# Patient Record
Sex: MALE | Race: WHITE | NOT HISPANIC OR LATINO | Employment: OTHER | URBAN - METROPOLITAN AREA
[De-identification: names, ages, dates, MRNs, and addresses within clinical notes are randomized per-mention and may not be internally consistent; named-entity substitution may affect disease eponyms.]

---

## 2017-02-09 ENCOUNTER — COMMUNICATION - HEALTHEAST (OUTPATIENT)
Dept: INTERNAL MEDICINE | Facility: CLINIC | Age: 67
End: 2017-02-09

## 2017-05-03 ENCOUNTER — COMMUNICATION - HEALTHEAST (OUTPATIENT)
Dept: INTERNAL MEDICINE | Facility: CLINIC | Age: 67
End: 2017-05-03

## 2017-06-19 ENCOUNTER — RECORDS - HEALTHEAST (OUTPATIENT)
Dept: ADMINISTRATIVE | Facility: OTHER | Age: 67
End: 2017-06-19

## 2017-06-26 ENCOUNTER — RECORDS - HEALTHEAST (OUTPATIENT)
Dept: ADMINISTRATIVE | Facility: OTHER | Age: 67
End: 2017-06-26

## 2017-07-19 ENCOUNTER — COMMUNICATION - HEALTHEAST (OUTPATIENT)
Dept: INTERNAL MEDICINE | Facility: CLINIC | Age: 67
End: 2017-07-19

## 2017-07-24 ENCOUNTER — OFFICE VISIT - HEALTHEAST (OUTPATIENT)
Dept: INTERNAL MEDICINE | Facility: CLINIC | Age: 67
End: 2017-07-24

## 2017-07-24 ENCOUNTER — AMBULATORY - HEALTHEAST (OUTPATIENT)
Dept: INTERNAL MEDICINE | Facility: CLINIC | Age: 67
End: 2017-07-24

## 2017-07-24 ENCOUNTER — COMMUNICATION - HEALTHEAST (OUTPATIENT)
Dept: SCHEDULING | Facility: CLINIC | Age: 67
End: 2017-07-24

## 2017-07-24 DIAGNOSIS — R79.89 ABNORMAL LIVER FUNCTION TESTS: ICD-10-CM

## 2017-07-24 DIAGNOSIS — Z00.00 ROUTINE GENERAL MEDICAL EXAMINATION AT A HEALTH CARE FACILITY: ICD-10-CM

## 2017-07-24 LAB
CHOLEST SERPL-MCNC: 168 MG/DL
FASTING STATUS PATIENT QL REPORTED: YES
HDLC SERPL-MCNC: 39 MG/DL
LDLC SERPL CALC-MCNC: 107 MG/DL
PSA SERPL-MCNC: <0.1 NG/ML (ref 0–4.5)
TRIGL SERPL-MCNC: 111 MG/DL

## 2017-07-24 ASSESSMENT — MIFFLIN-ST. JEOR: SCORE: 1681.38

## 2017-07-25 ENCOUNTER — COMMUNICATION - HEALTHEAST (OUTPATIENT)
Dept: INTERNAL MEDICINE | Facility: CLINIC | Age: 67
End: 2017-07-25

## 2017-07-27 ENCOUNTER — HOSPITAL ENCOUNTER (OUTPATIENT)
Dept: ULTRASOUND IMAGING | Facility: HOSPITAL | Age: 67
Discharge: HOME | End: 2017-07-27
Attending: INTERNAL MEDICINE

## 2017-07-27 DIAGNOSIS — R79.89 ABNORMAL LIVER FUNCTION TESTS: ICD-10-CM

## 2017-07-27 DIAGNOSIS — R79.89 OTHER SPECIFIED ABNORMAL FINDINGS OF BLOOD CHEMISTRY: ICD-10-CM

## 2017-08-18 ENCOUNTER — COMMUNICATION - HEALTHEAST (OUTPATIENT)
Dept: INTERNAL MEDICINE | Facility: CLINIC | Age: 67
End: 2017-08-18

## 2017-10-31 ENCOUNTER — COMMUNICATION - HEALTHEAST (OUTPATIENT)
Dept: INTERNAL MEDICINE | Facility: CLINIC | Age: 67
End: 2017-10-31

## 2017-11-06 ENCOUNTER — OFFICE VISIT - HEALTHEAST (OUTPATIENT)
Dept: INTERNAL MEDICINE | Facility: CLINIC | Age: 67
End: 2017-11-06

## 2017-11-06 DIAGNOSIS — G62.9 NEUROPATHY: ICD-10-CM

## 2017-11-06 ASSESSMENT — MIFFLIN-ST. JEOR: SCORE: 1685.92

## 2017-11-15 ENCOUNTER — RECORDS - HEALTHEAST (OUTPATIENT)
Dept: ADMINISTRATIVE | Facility: OTHER | Age: 67
End: 2017-11-15

## 2017-11-16 ENCOUNTER — COMMUNICATION - HEALTHEAST (OUTPATIENT)
Dept: INTERNAL MEDICINE | Facility: CLINIC | Age: 67
End: 2017-11-16

## 2017-11-28 ENCOUNTER — COMMUNICATION - HEALTHEAST (OUTPATIENT)
Dept: INTERNAL MEDICINE | Facility: CLINIC | Age: 67
End: 2017-11-28

## 2017-11-29 ENCOUNTER — RECORDS - HEALTHEAST (OUTPATIENT)
Dept: ADMINISTRATIVE | Facility: OTHER | Age: 67
End: 2017-11-29

## 2017-12-13 ENCOUNTER — RECORDS - HEALTHEAST (OUTPATIENT)
Dept: ADMINISTRATIVE | Facility: OTHER | Age: 67
End: 2017-12-13

## 2017-12-20 ENCOUNTER — COMMUNICATION - HEALTHEAST (OUTPATIENT)
Dept: INTERNAL MEDICINE | Facility: CLINIC | Age: 67
End: 2017-12-20

## 2018-01-22 ENCOUNTER — COMMUNICATION - HEALTHEAST (OUTPATIENT)
Dept: INTERNAL MEDICINE | Facility: CLINIC | Age: 68
End: 2018-01-22

## 2018-02-02 ENCOUNTER — OFFICE VISIT - HEALTHEAST (OUTPATIENT)
Dept: INTERNAL MEDICINE | Facility: CLINIC | Age: 68
End: 2018-02-02

## 2018-02-02 DIAGNOSIS — M54.30 SCIATICA: ICD-10-CM

## 2018-02-02 ASSESSMENT — MIFFLIN-ST. JEOR: SCORE: 1704.06

## 2018-02-07 ENCOUNTER — RECORDS - HEALTHEAST (OUTPATIENT)
Dept: ADMINISTRATIVE | Facility: OTHER | Age: 68
End: 2018-02-07

## 2018-02-08 ENCOUNTER — COMMUNICATION - HEALTHEAST (OUTPATIENT)
Dept: INTERNAL MEDICINE | Facility: CLINIC | Age: 68
End: 2018-02-08

## 2018-02-11 ENCOUNTER — COMMUNICATION - HEALTHEAST (OUTPATIENT)
Dept: INTERNAL MEDICINE | Facility: CLINIC | Age: 68
End: 2018-02-11

## 2018-02-11 DIAGNOSIS — I10 HTN (HYPERTENSION): ICD-10-CM

## 2018-02-20 ENCOUNTER — RECORDS - HEALTHEAST (OUTPATIENT)
Dept: ADMINISTRATIVE | Facility: OTHER | Age: 68
End: 2018-02-20

## 2018-08-02 ENCOUNTER — COMMUNICATION - HEALTHEAST (OUTPATIENT)
Dept: INTERNAL MEDICINE | Facility: CLINIC | Age: 68
End: 2018-08-02

## 2018-08-03 ENCOUNTER — OFFICE VISIT - HEALTHEAST (OUTPATIENT)
Dept: INTERNAL MEDICINE | Facility: CLINIC | Age: 68
End: 2018-08-03

## 2018-08-03 DIAGNOSIS — Z00.00 ROUTINE GENERAL MEDICAL EXAMINATION AT A HEALTH CARE FACILITY: ICD-10-CM

## 2018-08-03 LAB
ALBUMIN SERPL-MCNC: 4.1 G/DL (ref 3.5–5)
ALBUMIN UR-MCNC: NEGATIVE MG/DL
ALP SERPL-CCNC: 85 U/L (ref 45–120)
ALT SERPL W P-5'-P-CCNC: 32 U/L (ref 0–45)
ANION GAP SERPL CALCULATED.3IONS-SCNC: 8 MMOL/L (ref 5–18)
APPEARANCE UR: CLEAR
AST SERPL W P-5'-P-CCNC: 26 U/L (ref 0–40)
BILIRUB SERPL-MCNC: 0.9 MG/DL (ref 0–1)
BILIRUB UR QL STRIP: NEGATIVE
BUN SERPL-MCNC: 22 MG/DL (ref 8–22)
CALCIUM SERPL-MCNC: 9.8 MG/DL (ref 8.5–10.5)
CHLORIDE BLD-SCNC: 105 MMOL/L (ref 98–107)
CHOLEST SERPL-MCNC: 175 MG/DL
CO2 SERPL-SCNC: 25 MMOL/L (ref 22–31)
COLOR UR AUTO: YELLOW
CREAT SERPL-MCNC: 1.14 MG/DL (ref 0.7–1.3)
ERYTHROCYTE [DISTWIDTH] IN BLOOD BY AUTOMATED COUNT: 11.6 % (ref 11–14.5)
FASTING STATUS PATIENT QL REPORTED: YES
GFR SERPL CREATININE-BSD FRML MDRD: >60 ML/MIN/1.73M2
GLUCOSE BLD-MCNC: 94 MG/DL (ref 70–125)
GLUCOSE UR STRIP-MCNC: NEGATIVE MG/DL
HCT VFR BLD AUTO: 54 % (ref 40–54)
HDLC SERPL-MCNC: 39 MG/DL
HGB BLD-MCNC: 17.6 G/DL (ref 14–18)
HGB UR QL STRIP: NEGATIVE
KETONES UR STRIP-MCNC: NEGATIVE MG/DL
LDLC SERPL CALC-MCNC: 107 MG/DL
LEUKOCYTE ESTERASE UR QL STRIP: NEGATIVE
MCH RBC QN AUTO: 30 PG (ref 27–34)
MCHC RBC AUTO-ENTMCNC: 32.6 G/DL (ref 32–36)
MCV RBC AUTO: 92 FL (ref 80–100)
NITRATE UR QL: NEGATIVE
PH UR STRIP: 7 [PH] (ref 5–8)
PLATELET # BLD AUTO: 232 THOU/UL (ref 140–440)
PMV BLD AUTO: 8.2 FL (ref 7–10)
POTASSIUM BLD-SCNC: 4.4 MMOL/L (ref 3.5–5)
PROT SERPL-MCNC: 7.5 G/DL (ref 6–8)
PSA SERPL-MCNC: <0.1 NG/ML (ref 0–4.5)
RBC # BLD AUTO: 5.87 MILL/UL (ref 4.4–6.2)
SODIUM SERPL-SCNC: 138 MMOL/L (ref 136–145)
SP GR UR STRIP: 1.01 (ref 1–1.03)
TRIGL SERPL-MCNC: 147 MG/DL
TSH SERPL DL<=0.005 MIU/L-ACNC: 1.9 UIU/ML (ref 0.3–5)
UROBILINOGEN UR STRIP-ACNC: NORMAL
WBC: 8.6 THOU/UL (ref 4–11)

## 2018-08-03 ASSESSMENT — MIFFLIN-ST. JEOR: SCORE: 1682.52

## 2018-08-06 ENCOUNTER — COMMUNICATION - HEALTHEAST (OUTPATIENT)
Dept: INTERNAL MEDICINE | Facility: CLINIC | Age: 68
End: 2018-08-06

## 2018-10-15 ENCOUNTER — AMBULATORY - HEALTHEAST (OUTPATIENT)
Dept: INTERNAL MEDICINE | Facility: CLINIC | Age: 68
End: 2018-10-15

## 2018-10-15 ENCOUNTER — COMMUNICATION - HEALTHEAST (OUTPATIENT)
Dept: INTERNAL MEDICINE | Facility: CLINIC | Age: 68
End: 2018-10-15

## 2018-10-24 ENCOUNTER — COMMUNICATION - HEALTHEAST (OUTPATIENT)
Dept: SCHEDULING | Facility: CLINIC | Age: 68
End: 2018-10-24

## 2018-10-25 ENCOUNTER — AMBULATORY - HEALTHEAST (OUTPATIENT)
Dept: INTERNAL MEDICINE | Facility: CLINIC | Age: 68
End: 2018-10-25

## 2018-10-25 ENCOUNTER — COMMUNICATION - HEALTHEAST (OUTPATIENT)
Dept: INTERNAL MEDICINE | Facility: CLINIC | Age: 68
End: 2018-10-25

## 2018-10-26 ENCOUNTER — COMMUNICATION - HEALTHEAST (OUTPATIENT)
Dept: INTERNAL MEDICINE | Facility: CLINIC | Age: 68
End: 2018-10-26

## 2018-10-29 ENCOUNTER — COMMUNICATION - HEALTHEAST (OUTPATIENT)
Dept: INTERNAL MEDICINE | Facility: CLINIC | Age: 68
End: 2018-10-29

## 2018-10-29 ENCOUNTER — COMMUNICATION - HEALTHEAST (OUTPATIENT)
Dept: SCHEDULING | Facility: CLINIC | Age: 68
End: 2018-10-29

## 2018-11-07 ENCOUNTER — COMMUNICATION - HEALTHEAST (OUTPATIENT)
Dept: INTERNAL MEDICINE | Facility: CLINIC | Age: 68
End: 2018-11-07

## 2018-11-20 ENCOUNTER — RECORDS - HEALTHEAST (OUTPATIENT)
Dept: ADMINISTRATIVE | Facility: OTHER | Age: 68
End: 2018-11-20

## 2018-11-28 ENCOUNTER — OFFICE VISIT - HEALTHEAST (OUTPATIENT)
Dept: INTERNAL MEDICINE | Facility: CLINIC | Age: 68
End: 2018-11-28

## 2018-11-28 DIAGNOSIS — C61 MALIGNANT NEOPLASM OF PROSTATE (H): ICD-10-CM

## 2018-11-28 DIAGNOSIS — I10 ESSENTIAL HYPERTENSION: ICD-10-CM

## 2018-11-28 ASSESSMENT — MIFFLIN-ST. JEOR: SCORE: 1709.73

## 2019-01-08 ENCOUNTER — OFFICE VISIT - HEALTHEAST (OUTPATIENT)
Dept: INTERNAL MEDICINE | Facility: CLINIC | Age: 69
End: 2019-01-08

## 2019-01-08 DIAGNOSIS — C61 MALIGNANT NEOPLASM OF PROSTATE (H): ICD-10-CM

## 2019-01-08 DIAGNOSIS — I10 ESSENTIAL HYPERTENSION: ICD-10-CM

## 2019-01-08 ASSESSMENT — MIFFLIN-ST. JEOR: SCORE: 1696.12

## 2019-01-14 ENCOUNTER — RECORDS - HEALTHEAST (OUTPATIENT)
Dept: ADMINISTRATIVE | Facility: OTHER | Age: 69
End: 2019-01-14

## 2019-02-25 ENCOUNTER — RECORDS - HEALTHEAST (OUTPATIENT)
Dept: ADMINISTRATIVE | Facility: OTHER | Age: 69
End: 2019-02-25

## 2019-04-05 ENCOUNTER — COMMUNICATION - HEALTHEAST (OUTPATIENT)
Dept: INTERNAL MEDICINE | Facility: CLINIC | Age: 69
End: 2019-04-05

## 2019-04-05 DIAGNOSIS — I10 HTN (HYPERTENSION): ICD-10-CM

## 2019-04-18 ENCOUNTER — OFFICE VISIT - HEALTHEAST (OUTPATIENT)
Dept: INTERNAL MEDICINE | Facility: CLINIC | Age: 69
End: 2019-04-18

## 2019-04-18 DIAGNOSIS — I10 ESSENTIAL HYPERTENSION: ICD-10-CM

## 2019-04-18 ASSESSMENT — MIFFLIN-ST. JEOR: SCORE: 1682.52

## 2019-08-15 ENCOUNTER — OFFICE VISIT - HEALTHEAST (OUTPATIENT)
Dept: INTERNAL MEDICINE | Facility: CLINIC | Age: 69
End: 2019-08-15

## 2019-08-15 DIAGNOSIS — Z12.5 SCREENING FOR PROSTATE CANCER: ICD-10-CM

## 2019-08-15 DIAGNOSIS — Z00.00 ROUTINE GENERAL MEDICAL EXAMINATION AT A HEALTH CARE FACILITY: ICD-10-CM

## 2019-08-15 LAB
ALBUMIN SERPL-MCNC: 4.4 G/DL (ref 3.5–5)
ALBUMIN UR-MCNC: NEGATIVE MG/DL
ALP SERPL-CCNC: 96 U/L (ref 45–120)
ALT SERPL W P-5'-P-CCNC: 26 U/L (ref 0–45)
ANION GAP SERPL CALCULATED.3IONS-SCNC: 9 MMOL/L (ref 5–18)
APPEARANCE UR: CLEAR
AST SERPL W P-5'-P-CCNC: 26 U/L (ref 0–40)
BILIRUB SERPL-MCNC: 0.8 MG/DL (ref 0–1)
BILIRUB UR QL STRIP: NEGATIVE
BUN SERPL-MCNC: 17 MG/DL (ref 8–22)
CALCIUM SERPL-MCNC: 10.1 MG/DL (ref 8.5–10.5)
CHLORIDE BLD-SCNC: 107 MMOL/L (ref 98–107)
CHOLEST SERPL-MCNC: 165 MG/DL
CO2 SERPL-SCNC: 24 MMOL/L (ref 22–31)
COLOR UR AUTO: YELLOW
CREAT SERPL-MCNC: 1.12 MG/DL (ref 0.7–1.3)
ERYTHROCYTE [DISTWIDTH] IN BLOOD BY AUTOMATED COUNT: 11.6 % (ref 11–14.5)
FASTING STATUS PATIENT QL REPORTED: ABNORMAL
GFR SERPL CREATININE-BSD FRML MDRD: >60 ML/MIN/1.73M2
GLUCOSE BLD-MCNC: 92 MG/DL (ref 70–125)
GLUCOSE UR STRIP-MCNC: NEGATIVE MG/DL
HBA1C MFR BLD: 5.5 % (ref 3.5–6)
HCT VFR BLD AUTO: 53 % (ref 40–54)
HDLC SERPL-MCNC: 35 MG/DL
HGB BLD-MCNC: 17.7 G/DL (ref 14–18)
HGB UR QL STRIP: NEGATIVE
KETONES UR STRIP-MCNC: NEGATIVE MG/DL
LDLC SERPL CALC-MCNC: 111 MG/DL
LEUKOCYTE ESTERASE UR QL STRIP: NEGATIVE
MCH RBC QN AUTO: 30.5 PG (ref 27–34)
MCHC RBC AUTO-ENTMCNC: 33.4 G/DL (ref 32–36)
MCV RBC AUTO: 91 FL (ref 80–100)
NITRATE UR QL: NEGATIVE
PH UR STRIP: 7 [PH] (ref 5–8)
PLATELET # BLD AUTO: 218 THOU/UL (ref 140–440)
PMV BLD AUTO: 8.3 FL (ref 7–10)
POTASSIUM BLD-SCNC: 4.6 MMOL/L (ref 3.5–5)
PROT SERPL-MCNC: 7.2 G/DL (ref 6–8)
PSA SERPL-MCNC: <0.1 NG/ML (ref 0–4.5)
RBC # BLD AUTO: 5.8 MILL/UL (ref 4.4–6.2)
SODIUM SERPL-SCNC: 140 MMOL/L (ref 136–145)
SP GR UR STRIP: 1.01 (ref 1–1.03)
TRIGL SERPL-MCNC: 94 MG/DL
TSH SERPL DL<=0.005 MIU/L-ACNC: 1.64 UIU/ML (ref 0.3–5)
UROBILINOGEN UR STRIP-ACNC: NORMAL
WBC: 7.8 THOU/UL (ref 4–11)

## 2019-08-15 ASSESSMENT — MIFFLIN-ST. JEOR: SCORE: 1659.84

## 2019-08-16 ENCOUNTER — COMMUNICATION - HEALTHEAST (OUTPATIENT)
Dept: INTERNAL MEDICINE | Facility: CLINIC | Age: 69
End: 2019-08-16

## 2019-11-22 ENCOUNTER — COMMUNICATION - HEALTHEAST (OUTPATIENT)
Dept: INTERNAL MEDICINE | Facility: CLINIC | Age: 69
End: 2019-11-22

## 2019-11-22 DIAGNOSIS — I10 ESSENTIAL HYPERTENSION: ICD-10-CM

## 2019-12-02 ENCOUNTER — RECORDS - HEALTHEAST (OUTPATIENT)
Dept: ADMINISTRATIVE | Facility: OTHER | Age: 69
End: 2019-12-02

## 2019-12-02 ASSESSMENT — MIFFLIN-ST. JEOR: SCORE: 1691.14

## 2019-12-03 ENCOUNTER — SURGERY - HEALTHEAST (OUTPATIENT)
Dept: CARDIOLOGY | Facility: CLINIC | Age: 69
End: 2019-12-03

## 2019-12-03 ASSESSMENT — MIFFLIN-ST. JEOR
SCORE: 1681.16
SCORE: 1680.25

## 2019-12-04 ENCOUNTER — COMMUNICATION - HEALTHEAST (OUTPATIENT)
Dept: INTERNAL MEDICINE | Facility: CLINIC | Age: 69
End: 2019-12-04

## 2019-12-04 ENCOUNTER — AMBULATORY - HEALTHEAST (OUTPATIENT)
Dept: CARDIAC REHAB | Facility: CLINIC | Age: 69
End: 2019-12-04

## 2019-12-04 DIAGNOSIS — I21.4 NSTEMI (NON-ST ELEVATED MYOCARDIAL INFARCTION) (H): ICD-10-CM

## 2019-12-04 ASSESSMENT — MIFFLIN-ST. JEOR: SCORE: 1678.89

## 2019-12-06 ENCOUNTER — COMMUNICATION - HEALTHEAST (OUTPATIENT)
Dept: CARE COORDINATION | Facility: CLINIC | Age: 69
End: 2019-12-06

## 2019-12-09 ENCOUNTER — OFFICE VISIT - HEALTHEAST (OUTPATIENT)
Dept: INTERNAL MEDICINE | Facility: CLINIC | Age: 69
End: 2019-12-09

## 2019-12-09 ENCOUNTER — COMMUNICATION - HEALTHEAST (OUTPATIENT)
Dept: TELEHEALTH | Facility: CLINIC | Age: 69
End: 2019-12-09

## 2019-12-09 DIAGNOSIS — I10 ESSENTIAL HYPERTENSION: ICD-10-CM

## 2019-12-09 LAB
CHOLEST SERPL-MCNC: 129 MG/DL
FASTING STATUS PATIENT QL REPORTED: YES
HDLC SERPL-MCNC: 33 MG/DL
LDLC SERPL CALC-MCNC: 61 MG/DL
TRIGL SERPL-MCNC: 173 MG/DL

## 2019-12-09 ASSESSMENT — MIFFLIN-ST. JEOR: SCORE: 1702.93

## 2019-12-10 ENCOUNTER — COMMUNICATION - HEALTHEAST (OUTPATIENT)
Dept: INTERNAL MEDICINE | Facility: CLINIC | Age: 69
End: 2019-12-10

## 2019-12-18 ENCOUNTER — OFFICE VISIT - HEALTHEAST (OUTPATIENT)
Dept: CARDIOLOGY | Facility: CLINIC | Age: 69
End: 2019-12-18

## 2019-12-18 DIAGNOSIS — I24.9 ACS (ACUTE CORONARY SYNDROME) (H): ICD-10-CM

## 2019-12-18 DIAGNOSIS — R79.89 ELEVATED TROPONIN: ICD-10-CM

## 2019-12-18 DIAGNOSIS — I42.9 CARDIOMYOPATHY, UNSPECIFIED TYPE (H): ICD-10-CM

## 2019-12-18 ASSESSMENT — MIFFLIN-ST. JEOR: SCORE: 1721.08

## 2019-12-20 ENCOUNTER — HOSPITAL ENCOUNTER (OUTPATIENT)
Dept: CARDIOLOGY | Facility: HOSPITAL | Age: 69
Discharge: HOME | End: 2019-12-20
Attending: INTERNAL MEDICINE

## 2019-12-20 DIAGNOSIS — I42.9 CARDIOMYOPATHY, UNSPECIFIED TYPE (H): ICD-10-CM

## 2019-12-20 ASSESSMENT — MIFFLIN-ST. JEOR: SCORE: 1721.08

## 2019-12-23 LAB
AORTIC ROOT: 3.7 CM
ASCENDING AORTA: 3.7 CM
BSA FOR ECHO PROCEDURE: 2.17 M2
CV BLOOD PRESSURE: ABNORMAL MMHG
CV ECHO HEIGHT: 71 IN
CV ECHO WEIGHT: 207 LBS
EJECTION FRACTION: 55 % (ref 55–75)
FRACTIONAL SHORTENING: 42.4 % (ref 28–44)
INTERVENTRICULAR SEPTUM IN END DIASTOLE: 1.2 CM (ref 0.6–1)
IVS/PW RATIO: 1.1
LEFT VENTRICLE DIASTOLIC VOLUME INDEX: 57.1 CM3/M2 (ref 34–74)
LEFT VENTRICLE DIASTOLIC VOLUME: 124 CM3 (ref 62–150)
LEFT VENTRICLE HEART RATE: 60 BPM
LEFT VENTRICLE MASS INDEX: 118.1 G/M2
LEFT VENTRICLE SYSTOLIC VOLUME INDEX: 25.7 CM3/M2 (ref 11–31)
LEFT VENTRICLE SYSTOLIC VOLUME: 55.7 CM3 (ref 21–61)
LEFT VENTRICULAR INTERNAL DIMENSION IN DIASTOLE: 5.45 CM (ref 4.2–5.8)
LEFT VENTRICULAR INTERNAL DIMENSION IN SYSTOLE: 3.14 CM (ref 2.5–4)
LEFT VENTRICULAR MASS: 256.2 G
LEFT VENTRICULAR POSTERIOR WALL IN END DIASTOLE: 1.12 CM (ref 0.6–1)
NUC REST DIASTOLIC VOLUME INDEX: 3312 LBS
NUC REST SYSTOLIC VOLUME INDEX: 71 IN

## 2020-01-20 ENCOUNTER — COMMUNICATION - HEALTHEAST (OUTPATIENT)
Dept: INTERNAL MEDICINE | Facility: CLINIC | Age: 70
End: 2020-01-20

## 2020-02-26 ENCOUNTER — COMMUNICATION - HEALTHEAST (OUTPATIENT)
Dept: INTERNAL MEDICINE | Facility: CLINIC | Age: 70
End: 2020-02-26

## 2020-02-26 DIAGNOSIS — I24.9 ACS (ACUTE CORONARY SYNDROME) (H): ICD-10-CM

## 2020-02-26 DIAGNOSIS — I10 ESSENTIAL HYPERTENSION: ICD-10-CM

## 2020-03-02 ENCOUNTER — COMMUNICATION - HEALTHEAST (OUTPATIENT)
Dept: INTERNAL MEDICINE | Facility: CLINIC | Age: 70
End: 2020-03-02

## 2020-03-05 ENCOUNTER — RECORDS - HEALTHEAST (OUTPATIENT)
Dept: ADMINISTRATIVE | Facility: OTHER | Age: 70
End: 2020-03-05

## 2020-03-06 ENCOUNTER — OFFICE VISIT - HEALTHEAST (OUTPATIENT)
Dept: INTERNAL MEDICINE | Facility: CLINIC | Age: 70
End: 2020-03-06

## 2020-03-06 ENCOUNTER — COMMUNICATION - HEALTHEAST (OUTPATIENT)
Dept: INTERNAL MEDICINE | Facility: CLINIC | Age: 70
End: 2020-03-06

## 2020-03-06 DIAGNOSIS — I10 ESSENTIAL HYPERTENSION: ICD-10-CM

## 2020-03-06 ASSESSMENT — MIFFLIN-ST. JEOR: SCORE: 1702.93

## 2020-03-07 ENCOUNTER — RECORDS - HEALTHEAST (OUTPATIENT)
Dept: ADMINISTRATIVE | Facility: OTHER | Age: 70
End: 2020-03-07

## 2020-03-12 ENCOUNTER — RECORDS - HEALTHEAST (OUTPATIENT)
Dept: ADMINISTRATIVE | Facility: OTHER | Age: 70
End: 2020-03-12

## 2020-03-17 ENCOUNTER — OFFICE VISIT - HEALTHEAST (OUTPATIENT)
Dept: INTERNAL MEDICINE | Facility: CLINIC | Age: 70
End: 2020-03-17

## 2020-03-17 DIAGNOSIS — I10 ESSENTIAL HYPERTENSION: ICD-10-CM

## 2020-03-17 ASSESSMENT — MIFFLIN-ST. JEOR: SCORE: 1702.93

## 2020-03-25 ENCOUNTER — COMMUNICATION - HEALTHEAST (OUTPATIENT)
Dept: INTERNAL MEDICINE | Facility: CLINIC | Age: 70
End: 2020-03-25

## 2020-03-27 ENCOUNTER — OFFICE VISIT - HEALTHEAST (OUTPATIENT)
Dept: INTERNAL MEDICINE | Facility: CLINIC | Age: 70
End: 2020-03-27

## 2020-03-27 DIAGNOSIS — I25.10 CORONARY ARTERY DISEASE DUE TO LIPID RICH PLAQUE: ICD-10-CM

## 2020-03-27 DIAGNOSIS — I25.83 CORONARY ARTERY DISEASE DUE TO LIPID RICH PLAQUE: ICD-10-CM

## 2020-04-14 ENCOUNTER — COMMUNICATION - HEALTHEAST (OUTPATIENT)
Dept: INTERNAL MEDICINE | Facility: CLINIC | Age: 70
End: 2020-04-14

## 2020-04-14 ENCOUNTER — AMBULATORY - HEALTHEAST (OUTPATIENT)
Dept: INTERNAL MEDICINE | Facility: CLINIC | Age: 70
End: 2020-04-14

## 2020-04-14 DIAGNOSIS — E88.810 METABOLIC SYNDROME: ICD-10-CM

## 2020-04-20 ENCOUNTER — RECORDS - HEALTHEAST (OUTPATIENT)
Dept: ADMINISTRATIVE | Facility: OTHER | Age: 70
End: 2020-04-20

## 2020-04-26 ENCOUNTER — COMMUNICATION - HEALTHEAST (OUTPATIENT)
Dept: SCHEDULING | Facility: CLINIC | Age: 70
End: 2020-04-26

## 2020-04-26 ENCOUNTER — RECORDS - HEALTHEAST (OUTPATIENT)
Dept: ADMINISTRATIVE | Facility: OTHER | Age: 70
End: 2020-04-26

## 2020-04-28 ENCOUNTER — COMMUNICATION - HEALTHEAST (OUTPATIENT)
Dept: INTERNAL MEDICINE | Facility: CLINIC | Age: 70
End: 2020-04-28

## 2020-04-28 ENCOUNTER — AMBULATORY - HEALTHEAST (OUTPATIENT)
Dept: INTERNAL MEDICINE | Facility: CLINIC | Age: 70
End: 2020-04-28

## 2020-04-28 DIAGNOSIS — E88.810 METABOLIC SYNDROME: ICD-10-CM

## 2020-06-09 ENCOUNTER — COMMUNICATION - HEALTHEAST (OUTPATIENT)
Dept: CARDIOLOGY | Facility: CLINIC | Age: 70
End: 2020-06-09

## 2020-06-09 ENCOUNTER — COMMUNICATION - HEALTHEAST (OUTPATIENT)
Dept: INTERNAL MEDICINE | Facility: CLINIC | Age: 70
End: 2020-06-09

## 2020-08-14 ENCOUNTER — COMMUNICATION - HEALTHEAST (OUTPATIENT)
Dept: INTERNAL MEDICINE | Facility: CLINIC | Age: 70
End: 2020-08-14

## 2020-08-14 DIAGNOSIS — I10 ESSENTIAL HYPERTENSION: ICD-10-CM

## 2020-08-17 ENCOUNTER — OFFICE VISIT - HEALTHEAST (OUTPATIENT)
Dept: INTERNAL MEDICINE | Facility: CLINIC | Age: 70
End: 2020-08-17

## 2020-08-17 DIAGNOSIS — Z12.5 SCREENING FOR PROSTATE CANCER: ICD-10-CM

## 2020-08-17 DIAGNOSIS — Z00.00 ROUTINE GENERAL MEDICAL EXAMINATION AT A HEALTH CARE FACILITY: ICD-10-CM

## 2020-08-17 DIAGNOSIS — I10 ESSENTIAL HYPERTENSION: ICD-10-CM

## 2020-08-17 LAB
ALBUMIN SERPL-MCNC: 4.1 G/DL (ref 3.5–5)
ALBUMIN UR-MCNC: NEGATIVE MG/DL
ALP SERPL-CCNC: 97 U/L (ref 45–120)
ALT SERPL W P-5'-P-CCNC: 33 U/L (ref 0–45)
ANION GAP SERPL CALCULATED.3IONS-SCNC: 8 MMOL/L (ref 5–18)
APPEARANCE UR: CLEAR
AST SERPL W P-5'-P-CCNC: 24 U/L (ref 0–40)
BACTERIA #/AREA URNS HPF: ABNORMAL HPF
BILIRUB SERPL-MCNC: 0.7 MG/DL (ref 0–1)
BILIRUB UR QL STRIP: NEGATIVE
BUN SERPL-MCNC: 25 MG/DL (ref 8–28)
CALCIUM SERPL-MCNC: 10 MG/DL (ref 8.5–10.5)
CHLORIDE BLD-SCNC: 106 MMOL/L (ref 98–107)
CHOLEST SERPL-MCNC: 140 MG/DL
CO2 SERPL-SCNC: 24 MMOL/L (ref 22–31)
COLOR UR AUTO: YELLOW
CREAT SERPL-MCNC: 1.07 MG/DL (ref 0.7–1.3)
ERYTHROCYTE [DISTWIDTH] IN BLOOD BY AUTOMATED COUNT: 12 % (ref 11–14.5)
FASTING STATUS PATIENT QL REPORTED: YES
GFR SERPL CREATININE-BSD FRML MDRD: >60 ML/MIN/1.73M2
GLUCOSE BLD-MCNC: 91 MG/DL (ref 70–125)
GLUCOSE UR STRIP-MCNC: NEGATIVE MG/DL
HCT VFR BLD AUTO: 50.5 % (ref 40–54)
HDLC SERPL-MCNC: 33 MG/DL
HGB BLD-MCNC: 16.5 G/DL (ref 14–18)
HGB UR QL STRIP: NEGATIVE
KETONES UR STRIP-MCNC: NEGATIVE MG/DL
LDLC SERPL CALC-MCNC: 86 MG/DL
LEUKOCYTE ESTERASE UR QL STRIP: NEGATIVE
MCH RBC QN AUTO: 30.1 PG (ref 27–34)
MCHC RBC AUTO-ENTMCNC: 32.6 G/DL (ref 32–36)
MCV RBC AUTO: 92 FL (ref 80–100)
NITRATE UR QL: NEGATIVE
PH UR STRIP: 6 [PH] (ref 5–8)
PLATELET # BLD AUTO: 241 THOU/UL (ref 140–440)
PMV BLD AUTO: 7.9 FL (ref 7–10)
POTASSIUM BLD-SCNC: 4.5 MMOL/L (ref 3.5–5)
PROT SERPL-MCNC: 7 G/DL (ref 6–8)
PSA SERPL-MCNC: <0.1 NG/ML (ref 0–6.5)
RBC # BLD AUTO: 5.47 MILL/UL (ref 4.4–6.2)
RBC #/AREA URNS AUTO: ABNORMAL HPF
SODIUM SERPL-SCNC: 138 MMOL/L (ref 136–145)
SP GR UR STRIP: >=1.03 (ref 1–1.03)
SQUAMOUS #/AREA URNS AUTO: ABNORMAL LPF
TRIGL SERPL-MCNC: 103 MG/DL
TSH SERPL DL<=0.005 MIU/L-ACNC: 1.62 UIU/ML (ref 0.3–5)
UROBILINOGEN UR STRIP-ACNC: ABNORMAL
WBC #/AREA URNS AUTO: ABNORMAL HPF
WBC: 8.8 THOU/UL (ref 4–11)

## 2020-08-17 RX ORDER — DICLOFENAC SODIUM 75 MG/1
75 TABLET, DELAYED RELEASE ORAL 2 TIMES DAILY
Status: SHIPPED | COMMUNITY
Start: 2020-05-08 | End: 2021-08-24

## 2020-08-17 RX ORDER — MAGNESIUM 30 MG
400 TABLET ORAL DAILY
Status: SHIPPED | COMMUNITY
Start: 2020-08-17 | End: 2023-02-01

## 2020-08-17 ASSESSMENT — ANXIETY QUESTIONNAIRES
2. NOT BEING ABLE TO STOP OR CONTROL WORRYING: NOT AT ALL
1. FEELING NERVOUS, ANXIOUS, OR ON EDGE: NOT AT ALL

## 2020-08-17 ASSESSMENT — MIFFLIN-ST. JEOR: SCORE: 1667.78

## 2020-08-18 ENCOUNTER — COMMUNICATION - HEALTHEAST (OUTPATIENT)
Dept: INTERNAL MEDICINE | Facility: CLINIC | Age: 70
End: 2020-08-18

## 2020-08-21 ENCOUNTER — COMMUNICATION - HEALTHEAST (OUTPATIENT)
Dept: INTERNAL MEDICINE | Facility: CLINIC | Age: 70
End: 2020-08-21

## 2020-08-21 ENCOUNTER — COMMUNICATION - HEALTHEAST (OUTPATIENT)
Dept: SCHEDULING | Facility: CLINIC | Age: 70
End: 2020-08-21

## 2020-09-15 ENCOUNTER — COMMUNICATION - HEALTHEAST (OUTPATIENT)
Dept: INTERNAL MEDICINE | Facility: CLINIC | Age: 70
End: 2020-09-15

## 2020-10-07 ENCOUNTER — RECORDS - HEALTHEAST (OUTPATIENT)
Dept: ADMINISTRATIVE | Facility: OTHER | Age: 70
End: 2020-10-07

## 2020-11-04 ENCOUNTER — RECORDS - HEALTHEAST (OUTPATIENT)
Dept: ADMINISTRATIVE | Facility: OTHER | Age: 70
End: 2020-11-04

## 2020-12-03 ENCOUNTER — COMMUNICATION - HEALTHEAST (OUTPATIENT)
Dept: CARDIOLOGY | Facility: CLINIC | Age: 70
End: 2020-12-03

## 2020-12-04 ENCOUNTER — OFFICE VISIT - HEALTHEAST (OUTPATIENT)
Dept: CARDIOLOGY | Facility: CLINIC | Age: 70
End: 2020-12-04

## 2020-12-04 DIAGNOSIS — I10 ESSENTIAL HYPERTENSION: ICD-10-CM

## 2020-12-04 DIAGNOSIS — B33.22 VIRAL MYOCARDITIS: ICD-10-CM

## 2020-12-04 ASSESSMENT — MIFFLIN-ST. JEOR: SCORE: 1690.46

## 2020-12-08 ENCOUNTER — COMMUNICATION - HEALTHEAST (OUTPATIENT)
Dept: INTERNAL MEDICINE | Facility: CLINIC | Age: 70
End: 2020-12-08

## 2021-02-12 ENCOUNTER — COMMUNICATION - HEALTHEAST (OUTPATIENT)
Dept: INTERNAL MEDICINE | Facility: CLINIC | Age: 71
End: 2021-02-12

## 2021-02-16 ENCOUNTER — COMMUNICATION - HEALTHEAST (OUTPATIENT)
Dept: INTERNAL MEDICINE | Facility: CLINIC | Age: 71
End: 2021-02-16

## 2021-02-16 DIAGNOSIS — I10 ESSENTIAL HYPERTENSION: ICD-10-CM

## 2021-02-16 RX ORDER — LISINOPRIL 20 MG/1
20 TABLET ORAL DAILY
Qty: 90 TABLET | Refills: 1 | Status: SHIPPED | OUTPATIENT
Start: 2021-02-16 | End: 2021-08-15

## 2021-02-16 RX ORDER — AMLODIPINE BESYLATE 5 MG/1
5 TABLET ORAL DAILY
Qty: 90 TABLET | Refills: 1 | Status: SHIPPED | OUTPATIENT
Start: 2021-02-16 | End: 2021-08-15

## 2021-03-25 ENCOUNTER — AMBULATORY - HEALTHEAST (OUTPATIENT)
Dept: LAB | Facility: CLINIC | Age: 71
End: 2021-03-25

## 2021-03-25 ENCOUNTER — RECORDS - HEALTHEAST (OUTPATIENT)
Dept: ADMINISTRATIVE | Facility: OTHER | Age: 71
End: 2021-03-25

## 2021-03-25 DIAGNOSIS — M16.9 OSTEOARTHRITIS OF HIP: ICD-10-CM

## 2021-03-25 DIAGNOSIS — M25.559 HIP PAIN: ICD-10-CM

## 2021-03-27 LAB — CR SERPL-MCNC: 1.8 UG/L

## 2021-03-28 LAB — ARUP MISCELLANEOUS TEST: NORMAL

## 2021-03-29 LAB
MISCELLANEOUS TEST DEPT. - HE HISTORICAL: NORMAL
PERFORMING LAB: NORMAL
SPECIMEN STATUS: NORMAL
TEST NAME: NORMAL

## 2021-04-03 ENCOUNTER — COMMUNICATION - HEALTHEAST (OUTPATIENT)
Dept: INTERNAL MEDICINE | Facility: CLINIC | Age: 71
End: 2021-04-03

## 2021-04-03 DIAGNOSIS — E88.810 METABOLIC SYNDROME: ICD-10-CM

## 2021-04-05 RX ORDER — LOVASTATIN 10 MG
10 TABLET ORAL AT BEDTIME
Qty: 90 TABLET | Refills: 1 | Status: SHIPPED | OUTPATIENT
Start: 2021-04-05 | End: 2021-10-03

## 2021-04-26 ENCOUNTER — RECORDS - HEALTHEAST (OUTPATIENT)
Dept: ADMINISTRATIVE | Facility: OTHER | Age: 71
End: 2021-04-26

## 2021-05-14 ENCOUNTER — COMMUNICATION - HEALTHEAST (OUTPATIENT)
Dept: INTERNAL MEDICINE | Facility: CLINIC | Age: 71
End: 2021-05-14

## 2021-05-14 DIAGNOSIS — I10 ESSENTIAL HYPERTENSION: ICD-10-CM

## 2021-05-14 RX ORDER — METOPROLOL SUCCINATE 50 MG/1
50 TABLET, EXTENDED RELEASE ORAL DAILY
Qty: 90 TABLET | Refills: 0 | Status: SHIPPED | OUTPATIENT
Start: 2021-05-14 | End: 2021-08-15

## 2021-05-24 ENCOUNTER — RECORDS - HEALTHEAST (OUTPATIENT)
Dept: ADMINISTRATIVE | Facility: OTHER | Age: 71
End: 2021-05-24

## 2021-05-27 NOTE — PROGRESS NOTES
Office Visit - Follow up    Chan Bishop   68 y.o. male    Date of Visit: 4/18/2019    Chief Complaint   Patient presents with     Hypertension       Subjective: Hypertension.    Lost a brother to suicide within the last couple months.    Long discussion regarding that patient also former patient of this examiner.  Had been seen by psychology awaiting consultation with psychiatry I believe.    Offers no new complaints denies chest pain shortness of breath.    No blood in stool or urine offered laboratory testing patient declined.    Colonoscopy normal September 23, 2016.    ROS: A comprehensive review of systems was performed and was otherwise negative    Medications:  Prior to Admission medications    Medication Sig Start Date End Date Taking? Authorizing Provider   amLODIPine-benazepril (LOTREL 5-20) 5-20 mg per capsule TAKE ONE CAPSULE BY MOUTH ONE TIME DAILY  4/6/19  Yes Florentin Bill MD   metoprolol succinate (TOPROL-XL) 50 MG 24 hr tablet Take 1 tablet (50 mg total) by mouth daily. 11/28/18  Yes Florentin Bill MD   acyclovir (ZOVIRAX) 5 % ointment Thin layer tid prn 11/29/17   Florentin Bill MD       Allergies:   Allergies   Allergen Reactions     Sulfa (Sulfonamide Antibiotics) Rash       Immunizations:   Immunization History   Administered Date(s) Administered     Hep A, historic 01/10/2008     Hep B, Adult 08/11/2015, 09/11/2015, 02/23/2016     Influenza high dose, seasonal 09/11/2015, 10/14/2016, 09/30/2017     Influenza, nasal, historic 09/17/2018     Japanese Encephalitis, IM 08/16/2010, 09/09/2010, 09/05/2013     Pneumo Conj 13-V (2010&after) 07/20/2015     Pneumo Polysac 23-V 07/21/2016     Td, Adult, Absorbed 06/02/2004     Td,adult,historic,unspecified 06/02/2004, 03/02/2009     Tdap 03/02/2009     Typhoid Live, Oral 01/10/2008, 09/05/2013     ZOSTER, LIVE 01/07/2015       Exam Chest clear to auscultation and percussion.  Heart tones regular rhythm without murmur rub or gallop.   Abdomen soft nontender no organomegaly.  No peritoneal signs.  Extremities free of edema cyanosis or clubbing.  Neck veins nondistended no thyromegaly or scleral icterus noted, carotids full.  Skin warm and dry easily conversant good spirited.  Normal intelligence.  Neurologically intact no gross localizing findings.    120/74 pulse 60 respirations 18 O2 sats 95%.    Sulfa allergy.    Assessment and Plan  Hypertension controlled.  120/74.    Overweight BMI 29 weight down 3 pounds.    History of prostate cancer no clinical evidence of recurrence.      Sulfa allergy    Time: total time spent with the patient was 15 minutes of which >50% was spent in counseling and coordination of care    The following high BMI interventions were performed this visit: encouragement to exercise    Florentin Bill MD    Patient Active Problem List   Diagnosis     Reactions To Sulfa Drugs     Adenocarcinoma Of The Prostate Gland     Essential Hypertension

## 2021-05-30 ENCOUNTER — RECORDS - HEALTHEAST (OUTPATIENT)
Dept: ADMINISTRATIVE | Facility: CLINIC | Age: 71
End: 2021-05-30

## 2021-05-31 VITALS — WEIGHT: 200 LBS | BODY MASS INDEX: 28 KG/M2 | HEIGHT: 71 IN

## 2021-05-31 VITALS — HEIGHT: 71 IN | WEIGHT: 201 LBS | BODY MASS INDEX: 28.14 KG/M2

## 2021-05-31 VITALS — BODY MASS INDEX: 28.29 KG/M2 | WEIGHT: 200 LBS

## 2021-05-31 NOTE — PROGRESS NOTES
Annual wellness visit  Assessment and Plan:   Annual wellness visit    1. Routine general medical examination at a health care facility  Annual wellness visit  - HM2(CBC w/o Differential)  - Comprehensive Metabolic Panel  - Lipid Cascade  - Thyroid Stimulating Hormone (TSH)  - Urinalysis-UC if Indicated  - Glycosylated Hemoglobin A1c    2. Screening for prostate cancer  Annual wellness visit  - PSA (Prostatic-Specific Antigen), Annual Screen     The patient's current medical problems were reviewed.    I have had an Advance Directives discussion with the patient.  The following health maintenance schedule was reviewed with the patient and provided in printed form in the after visit summary:   Health Maintenance   Topic Date Due     HEPATITIS C SCREENING  1950     ZOSTER VACCINES (2 of 3) 03/04/2015     TD 18+ HE  03/02/2019     INFLUENZA VACCINE RULE BASED (1) 08/01/2019     FALL RISK ASSESSMENT  08/03/2019     MEDICARE ANNUAL WELLNESS VISIT  08/03/2019     ADVANCE CARE PLANNING  08/03/2023     COLONOSCOPY  09/23/2026     PNEUMOCOCCAL POLYSACCHARIDE VACCINE AGE 65 AND OVER  Completed     PNEUMOCOCCAL CONJUGATE VACCINE FOR ADULTS (PCV13 OR PREVNAR)  Completed        Subjective:   Chief Complaint: Chan Bishop is an 69 y.o. male here for an Annual Wellness visit.   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    Last colonoscopy done September 23, 2016 normal.    Non-smoker no excess alcohol enjoys traveling.  As does his wife.  They are going to a higher elevation in [place] sometime in May 2020 I did suggest travel medicine consult but will provide ciprofloxacin for traveler's diarrhea at the dose of 500 mg twice a day for 7 to 10 days plus I did advise okay for Diamox 125 mg 3 times a day for altitude sickness prevention.    Sulfa allergy.    Immunizations reviewed and up-to-date.    Left hip arthroplasty and fractured ankle no surgery for the  fractured ankle but left total hip arthroplasty for degenerative osteoarthritis.    Prostate cancer status post open prostatectomy without clinical evidence of recurrence.  Deemed to be cured by Dr. EUBANKS [facility] [place] [facility].    Mother  ovarian cancer age 45 one sister  of brain cancer age 48.  Father  age 83 of Alzheimer's disease after a fall.  2 children well and 2 grandchildren well.  Wife living with history of myocardial infarction related to coronary vasospasm.  She is a retired nurse.    Enjoys travel has been to [place].    Review of Systems:    Please see above.  The rest of the review of systems are negative for all systems.    Patient Care Team:  Florentin Bill MD as PCP - General     Patient Active Problem List   Diagnosis     Reactions To Sulfa Drugs     Adenocarcinoma Of The Prostate Gland     Essential Hypertension     History reviewed. No pertinent past medical history.   History reviewed. No pertinent surgical history.   History reviewed. No pertinent family history.   Social History     Socioeconomic History     Marital status:      Spouse name: Not on file     Number of children: Not on file     Years of education: Not on file     Highest education level: Not on file   Occupational History     Not on file   Social Needs     Financial resource strain: Not on file     Food insecurity:     Worry: Not on file     Inability: Not on file     Transportation needs:     Medical: Not on file     Non-medical: Not on file   Tobacco Use     Smoking status: Never Smoker     Smokeless tobacco: Never Used   Substance and Sexual Activity     Alcohol use: Yes     Alcohol/week: 0.6 oz     Types: 1 Standard drinks or equivalent per week     Comment: seldom     Drug use: No     Sexual activity: Not on file   Lifestyle     Physical activity:     Days per week: Not on file     Minutes per session: Not on file     Stress: Not on file   Relationships     Social connections:     Talks on phone: Not  "on file     Gets together: Not on file     Attends [employer] service: Not on file     Active member of club or organization: Not on file     Attends meetings of clubs or organizations: Not on file     Relationship status: Not on file     Intimate partner violence:     Fear of current or ex partner: Not on file     Emotionally abused: Not on file     Physically abused: Not on file     Forced sexual activity: Not on file   Other Topics Concern     Not on file   Social History Narrative     Not on file      Current Outpatient Medications   Medication Sig Dispense Refill     amLODIPine-benazepril (LOTREL 5-20) 5-20 mg per capsule TAKE ONE CAPSULE BY MOUTH ONE TIME DAILY  90 capsule 2     metoprolol succinate (TOPROL-XL) 50 MG 24 hr tablet Take 1 tablet (50 mg total) by mouth daily. 90 tablet 3     acyclovir (ZOVIRAX) 5 % ointment Thin layer tid prn 15 g 0     No current facility-administered medications for this visit.       Objective:   Vital Signs:   Visit Vitals  /68 (Patient Site: Right Arm, Patient Position: Sitting)   Pulse (!) 55   Ht 5' 10\" (1.778 m)   Wt 197 lb (89.4 kg)   SpO2 98%   BMI 28.27 kg/m         VisionScreening:  No exam data present     PHYSICAL EXAM  Chest clear to auscultation and percussion.  Heart tones regular rhythm without murmur rub or gallop.  Abdomen soft nontender no organomegaly.  No peritoneal signs.  Extremities free of edema cyanosis or clubbing.  Neck veins nondistended no thyromegaly or scleral icterus noted, carotids full.  Skin warm and dry easily conversant good spirited.  Normal intelligence.  Neurologically intact no gross localizing findings.  Skin negative lymph negative neuro negative psych normal HEENT negative wears glasses easily conversing good spirited good pulse noted in all 4 extremities no carotid bruits or thyromegaly.  Genital rectal exam negative prostate tissue is not palpable well-healed infraumbilical midline scar from open prostatectomy no clinical " evidence of recurrence on exam.  PSA pending along with rest of comprehensive physical exam labs and A1c.  And urinalysis.    Assessment Results 8/15/2019   Activities of Daily Living No help needed   Instrumental Activities of Daily Living No help needed   Get Up and Go Score Less than 12 seconds   Mini Cog Total Score 5   Some recent data might be hidden     A Mini-Cog score of 0-2 suggests the possibility of dementia, score of 3-5 suggests no dementia    Identified Health Risks:     The patient was counseled and encouraged to consider modifying their diet and eating habits. He was provided with information on recommended healthy diet options.  Patient's advanced directive was discussed and I am comfortable with the patient's wishes.

## 2021-06-01 VITALS — BODY MASS INDEX: 28.7 KG/M2 | WEIGHT: 205 LBS | HEIGHT: 71 IN

## 2021-06-01 VITALS — HEIGHT: 70 IN | WEIGHT: 202 LBS | BODY MASS INDEX: 28.92 KG/M2

## 2021-06-02 VITALS — BODY MASS INDEX: 29.78 KG/M2 | WEIGHT: 208 LBS | HEIGHT: 70 IN

## 2021-06-02 VITALS — HEIGHT: 70 IN | BODY MASS INDEX: 29.35 KG/M2 | WEIGHT: 205 LBS

## 2021-06-03 VITALS — BODY MASS INDEX: 28.92 KG/M2 | HEIGHT: 70 IN | WEIGHT: 202 LBS

## 2021-06-03 VITALS — WEIGHT: 197 LBS | BODY MASS INDEX: 28.2 KG/M2 | HEIGHT: 70 IN

## 2021-06-03 NOTE — TELEPHONE ENCOUNTER
Refill Approved    Rx renewed per Medication Renewal Policy. Medication was last renewed on 11/28/18.    Annika Bhatt, [facility] Triage/Med Refill 11/22/2019     Requested Prescriptions   Pending Prescriptions Disp Refills     metoprolol succinate (TOPROL-XL) 50 MG 24 hr tablet [Pharmacy Med Name: Metoprolol Succinate ER Oral Tablet Extended Release 24 Hour 50 MG] 90 tablet 2     Sig: Take 1 tablet (50 mg total) by mouth daily.       Beta-Blockers Refill Protocol Passed - 11/22/2019  8:26 AM        Passed - PCP or prescribing provider visit in past 12 months or next 3 months     Last office visit with prescriber/PCP: 4/18/2019 Florentin Bill MD OR same dept: 4/18/2019 Florentin Bill MD OR same specialty: 4/18/2019 Florentin Bill MD  Last physical: 8/15/2019 Last MTM visit: Visit date not found   Next visit within 3 mo: Visit date not found  Next physical within 3 mo: Visit date not found  Prescriber OR PCP: Florentin Bill MD  Last diagnosis associated with med order: 1. Essential hypertension  - metoprolol succinate (TOPROL-XL) 50 MG 24 hr tablet [Pharmacy Med Name: Metoprolol Succinate ER Oral Tablet Extended Release 24 Hour 50 MG]; Take 1 tablet (50 mg total) by mouth daily.  Dispense: 90 tablet; Refill: 2    If protocol passes may refill for 12 months if within 3 months of last provider visit (or a total of 15 months).             Passed - Blood pressure filed in past 12 months     BP Readings from Last 1 Encounters:   08/15/19 112/68

## 2021-06-04 VITALS
HEIGHT: 71 IN | WEIGHT: 203 LBS | OXYGEN SATURATION: 98 % | DIASTOLIC BLOOD PRESSURE: 68 MMHG | HEART RATE: 66 BPM | SYSTOLIC BLOOD PRESSURE: 104 MMHG | BODY MASS INDEX: 28.42 KG/M2

## 2021-06-04 VITALS
HEIGHT: 71 IN | SYSTOLIC BLOOD PRESSURE: 114 MMHG | DIASTOLIC BLOOD PRESSURE: 80 MMHG | BODY MASS INDEX: 28.98 KG/M2 | WEIGHT: 207 LBS | HEART RATE: 56 BPM | RESPIRATION RATE: 16 BRPM

## 2021-06-04 VITALS — HEIGHT: 71 IN | BODY MASS INDEX: 28.98 KG/M2 | WEIGHT: 207 LBS

## 2021-06-04 VITALS — HEIGHT: 71 IN | WEIGHT: 197.7 LBS | BODY MASS INDEX: 27.68 KG/M2

## 2021-06-04 VITALS
DIASTOLIC BLOOD PRESSURE: 72 MMHG | BODY MASS INDEX: 28.42 KG/M2 | SYSTOLIC BLOOD PRESSURE: 118 MMHG | OXYGEN SATURATION: 99 % | HEIGHT: 71 IN | HEART RATE: 63 BPM | WEIGHT: 203 LBS

## 2021-06-04 VITALS
BODY MASS INDEX: 27.58 KG/M2 | HEART RATE: 59 BPM | SYSTOLIC BLOOD PRESSURE: 124 MMHG | HEIGHT: 71 IN | TEMPERATURE: 97.8 F | WEIGHT: 197 LBS | DIASTOLIC BLOOD PRESSURE: 80 MMHG | OXYGEN SATURATION: 98 %

## 2021-06-04 VITALS
HEIGHT: 71 IN | BODY MASS INDEX: 28.42 KG/M2 | HEART RATE: 75 BPM | OXYGEN SATURATION: 99 % | DIASTOLIC BLOOD PRESSURE: 68 MMHG | WEIGHT: 203 LBS | SYSTOLIC BLOOD PRESSURE: 110 MMHG

## 2021-06-04 NOTE — TELEPHONE ENCOUNTER
Dr. Bill,    Are you able to see patient before 12/10?    Giselle MARTINEZ LPN .......... 1:59 PM  12/04/19

## 2021-06-04 NOTE — TELEPHONE ENCOUNTER
Happy to see him anytime and please check w/ Rayne my assistant as to best day and time for the pt

## 2021-06-04 NOTE — PROGRESS NOTES
Office Visit - Follow up    Chan Bishop   69 y.o. male    Date of Visit: 12/9/2019    Chief Complaint   Patient presents with     Hospital Visit Follow Up     12/2-4/19 Chest pain  elevated Troponin     Hypertension       Subjective: Hypertension.    Short of breath left-sided chest pain seen in the emergency room seen in urgency room hospitalized for short period of time from December 2 through December 4 full cardiac work-up including MRI scan of the heart plus coronary arteriography showed ejection fraction on the MRI scan of the heart to be 56% with a localized area of myocarditis involving the inferobasal segment.  He had describes tachycardia in the past.    Wonder if this is tachycardia mediated cardiomyopathy.  Medication list was adjusted.  No blood in stool or urine no chest pain now no shortness of breath syncope or palpitations.  Medication list reviewed reconciled.  He is allergic to sulfa.  Accompanied by his wife Ledy who is a retired registered nurse.    ROS: A comprehensive review of systems was performed and was otherwise negative    Medications:  Prior to Admission medications    Medication Sig Start Date End Date Taking? Authorizing Provider   amLODIPine (NORVASC) 5 MG tablet Take 5 mg by mouth daily. 12/4/19  Yes PROVIDER, HISTORICAL   aspirin 81 mg chewable tablet Chew 1 tablet (81 mg total) daily. 12/5/19  Yes Beverly Wu MD   atorvastatin (LIPITOR) 10 MG tablet Take 1 tablet (10 mg total) by mouth at bedtime. 12/4/19  Yes Beverly Wu MD   lisinopril (PRINIVIL,ZESTRIL) 20 MG tablet Take 20 mg by mouth daily. 12/4/19  Yes PROVIDER, HISTORICAL   metoprolol succinate (TOPROL-XL) 50 MG 24 hr tablet Take 1 tablet (50 mg total) by mouth daily. 11/22/19  Yes Florentin Bill MD   nitroglycerin (NITROSTAT) 0.4 MG SL tablet Place 1 tablet (0.4 mg total) under the tongue every 5 (five) minutes as needed for chest pain. 12/4/19   Beverly Wu MD   amLODIPine-benazepril (LOTREL  5-20) 5-20 mg per capsule TAKE ONE CAPSULE BY MOUTH ONE TIME DAILY  4/6/19 12/9/19  Florentin Bill MD       Allergies:   Allergies   Allergen Reactions     Sulfa (Sulfonamide Antibiotics) Rash       Immunizations:   Immunization History   Administered Date(s) Administered     Hep A, historic 01/10/2008     Hep B, Adult 08/11/2015, 09/11/2015, 02/23/2016     Influenza high dose,seasonal,PF, 65+ yrs 09/11/2015, 10/14/2016, 09/30/2017     Influenza, nasal, historic 09/17/2018     Japanese Encephalitis, IM 08/16/2010, 09/09/2010, 09/05/2013     Pneumo Conj 13-V (2010&after) 07/20/2015     Pneumo Polysac 23-V 07/21/2016     Td, Adult, Absorbed 06/02/2004     Td,adult,historic,unspecified 06/02/2004, 03/02/2009     Tdap 03/02/2009     Typhoid Live, Oral 01/10/2008, 09/05/2013     ZOSTER, LIVE 01/07/2015       Exam Chest clear to auscultation and percussion.  Heart tones regular rhythm without murmur rub or gallop.  Abdomen soft nontender no organomegaly.  No peritoneal signs.  Extremities free of edema cyanosis or clubbing.  Neck veins nondistended no thyromegaly or scleral icterus noted, carotids full.  Skin warm and dry easily conversant good spirited.  Normal intelligence.  Neurologically intact no gross localizing findings.  There was a suggestion of occasional premature beats neck veins were nondistended no leg edema he was advised regarding the need to lose weight with his BMI of 28+.    104/68 pulse 66 respirations 18 unlabored O2 sats 98%.  He denies PND he was advised to avoid salt in the diet.    Assessment and Plan  Hypertension controlled.  104/68.  Stable check lipid panel today.    Localized area of myocarditis or cardiomyopathy involving the inferior basal segment of the heart.  Discussed in detail salt restriction recommended also I did recommend close follow-up with the cardiology team at [facility] here in [place].    Sulfa allergy.    Overweight needs to lose weight with caloric  restriction regular exercise.  BMI 28.3.  Weight up 6 pounds from previous.  Salt restriction may also help    Hyperlipidemia on statin therapy stable lipid panel pending see above.    Time: total time spent with the patient was 40 minutes of which >50% was spent in counseling and coordination of care    The following high BMI interventions were performed this visit: encouragement to exercise    Florentin Bill MD    Patient Active Problem List   Diagnosis     Reactions To Sulfa Drugs     Adenocarcinoma Of The Prostate Gland     Essential hypertension     Elevated troponin     ACS (acute coronary syndrome) (H)     Pleuritic chest pain     Cardiomyopathy, unspecified type (H)

## 2021-06-04 NOTE — TELEPHONE ENCOUNTER
New Appointment Needed  What is the reason for the visit:    Inpatient/ED Follow Up Appt Request  At what hospital or facility were you seen?: [facility]  What is the reason you were seen?: Angiogram  What date were you admitted?: date: 12/2  What date were you discharged?: date: 12/4  What was the recommended timeframe for your follow up appointment?: 5 days   Provider Preference: PCP only  How soon do you need to be seen?: before 12/10 per recommendation  Waitlist offered?: No  Okay to leave a detailed message:  Yes

## 2021-06-04 NOTE — PATIENT INSTRUCTIONS - HE
1. We will recheck an echocardiogram to look for improvement in year left ventricular performance.  2. If left ventricular function is stable or improved, gradually resume full activities without limitations.  3. No medication changes today.  4. Look forward to seeing you again in 1 year's time.

## 2021-06-05 VITALS
OXYGEN SATURATION: 98 % | DIASTOLIC BLOOD PRESSURE: 84 MMHG | BODY MASS INDEX: 28.28 KG/M2 | WEIGHT: 202 LBS | HEIGHT: 71 IN | SYSTOLIC BLOOD PRESSURE: 126 MMHG | HEART RATE: 64 BPM | RESPIRATION RATE: 16 BRPM

## 2021-06-06 NOTE — TELEPHONE ENCOUNTER
Who is calling:  Wife, Ledy calling. Consent to communicate is on file  Reason for Call:  Calling to check on below request. Please call.  Date of last appointment with primary care: today  Okay to leave a detailed message: Yes

## 2021-06-06 NOTE — TELEPHONE ENCOUNTER
Question following Office Visit  When did you see your provider: today  What is your question: Asking for nurse Mclain to please call her. Reports some questions on travel form were not completed. Please advise asap!  Okay to leave a detailed message: Yes

## 2021-06-06 NOTE — TELEPHONE ENCOUNTER
Dr. Bill,    Please advise on upcoming travel.    Thank you.    Giselle MARTINEZ LPN .......... 7:20 AM  03/03/20

## 2021-06-06 NOTE — TELEPHONE ENCOUNTER
Medication Request  Medication name:   amLODIPine (NORVASC) 5 MG tablet  3 12/4/2019     Sig - Route: Take 5 mg by mouth daily. - Oral    Class: Historical Med      atorvastatin (LIPITOR) 10 MG tablet 30 tablet 2 12/4/2019     Sig - Route: Take 1 tablet (10 mg total) by mouth at bedtime. - Oral      lisinopril (PRINIVIL,ZESTRIL) 20 MG tablet  2 12/4/2019     Sig - Route: Take 20 mg by mouth daily. - Oral    Class: Historical Med      Requested Pharmacy: [facility]  Reason for request: Patient is requesting Dr Bill to fill these medications for him.  Is requesting 90 day supply.  Please address.    Okay to leave a detailed message: yes

## 2021-06-06 NOTE — PROGRESS NOTES
Office Visit - Follow up    Chan Bishop   69 y.o. male    Date of Visit: 3/6/2020    Chief Complaint   Patient presents with     Paperwork       Subjective: Hypertension.    Coronary artery disease with history of ACS and cardiomyopathy dating back to December 2 and December 4, 2019 when patient was hospitalized [facility].  Dr. Ferny Bruce admitting hospitalist from internal medicine and patient was diagnosed with acute coronary syndrome.  Currently the patient feels well without chest pain shortness of breath no palpitations no exertional syncope.    Medication list reviewed reconciled in the chart generally well-tolerated.    Non-smoker no excess alcohol.  The patient is accompanied today by his wife.  They had planned a trip to [facility] for hiking near [place].  This was canceled.  Coronavirus outbreak .    No blood in stool or urine.  No melena.  Feels well.  Exercises daily watches his diet.    ROS: A comprehensive review of systems was performed and was otherwise negative    Medications:  Prior to Admission medications    Medication Sig Start Date End Date Taking? Authorizing Provider   amLODIPine (NORVASC) 5 MG tablet Take 1 tablet (5 mg total) by mouth daily. 2/26/20  Yes Florentin Bill MD   aspirin 81 mg chewable tablet Chew 1 tablet (81 mg total) daily. 12/5/19  Yes Beverly Wu MD   atorvastatin (LIPITOR) 10 MG tablet Take 1 tablet (10 mg total) by mouth at bedtime. 2/26/20  Yes Florentin Bill MD   lisinopriL (PRINIVIL,ZESTRIL) 20 MG tablet Take 1 tablet (20 mg total) by mouth daily. 2/26/20  Yes Florentin Bill MD   metoprolol succinate (TOPROL-XL) 50 MG 24 hr tablet Take 1 tablet (50 mg total) by mouth daily. 11/22/19  Yes Florentin Bill MD   nitroglycerin (NITROSTAT) 0.4 MG SL tablet Place 1 tablet (0.4 mg total) under the tongue every 5 (five) minutes as needed for chest pain. 12/4/19   Beverly Wu MD       Allergies:   Allergies   Allergen Reactions      Sulfa (Sulfonamide Antibiotics) Rash       Immunizations:   Immunization History   Administered Date(s) Administered     Hep A, historic 01/10/2008     Hep B, Adult 08/11/2015, 09/11/2015, 02/23/2016     Influenza high dose,seasonal,PF, 65+ yrs 09/11/2015, 10/14/2016, 09/30/2017     Influenza, nasal, historic 09/17/2018     Japanese Encephalitis, IM 08/16/2010, 09/09/2010, 09/05/2013     Pneumo Conj 13-V (2010&after) 07/20/2015     Pneumo Polysac 23-V 07/21/2016     Td, Adult, Absorbed 06/02/2004     Td,adult,historic,unspecified 06/02/2004, 03/02/2009     Tdap 03/02/2009     Typhoid Live, Oral 01/10/2008, 09/05/2013     ZOSTER, LIVE 01/07/2015       Exam Chest clear to auscultation and percussion.  Heart tones regular rhythm without murmur rub or gallop.  Abdomen soft nontender no organomegaly.  No peritoneal signs.  Extremities free of edema cyanosis or clubbing.  Neck veins nondistended no thyromegaly or scleral icterus noted, carotids full.  Skin warm and dry easily conversant good spirited.  Normal intelligence.  Neurologically intact no gross localizing findings.    118/72 pulse 63 respirations 18 O2 sats 99%.  BMI 28+ weight 203 pounds.    Assessment and Plan  Hypertension controlled stable I counseled patient avoid salt in diet.    Coronary artery disease with history of ischemic cardiomyopathy and acute coronary syndrome early December 2019 discussed at length.  Advised no travel to patient where coronavirus is endemic forms are completed.    I cautioned patient regarding the need for regular exercise.    Colonoscopy normal September 23, 2016 with Dr. [facility].    History of prostate cancer no clinical evidence of recurrence status post prostatectomy.    Time: total time spent with the patient was 25 minutes of which >50% was spent in counseling and coordination of care    The following high BMI interventions were performed this visit: encouragement to exercise    Florentin Bill,  MD    Patient Active Problem List   Diagnosis     Reactions To Sulfa Drugs     Adenocarcinoma Of The Prostate Gland     Essential hypertension     Elevated troponin     ACS (acute coronary syndrome) (H)     Pleuritic chest pain     Cardiomyopathy, unspecified type (H)

## 2021-06-06 NOTE — PROGRESS NOTES
Office Visit - Follow up    Chan Bishop   69 y.o. male    Date of Visit: 3/17/2020    Chief Complaint   Patient presents with     Paperwork     Needs travel insurance form completed / not fasting        Subjective: Hypertension.    Blood pressure control is excellent at 110/68.    Recent diagnosis of osteoarthritis right hip with Dr. Ferny Canas at [facility].  MRI scan of the right hip was done.  Rheumatology consult in the offing.  Ultimately may require right total hip arthroplasty.    When the patient took prednisone the patient felt better all over or it was a Medrol Dosepak excuse me.    The patient is not able to travel to [facility] because of prior history of acute coronary syndrome and ischemic cardiomyopathy as outlined in prior notes.    The patient denies chest pain shortness of breath no blood in stool or urine medication list reviewed well-tolerated normal effects.  This was reconciled.    He is a non-smoker the patient does not abuse alcohol.    Prior history of robotic prostatectomy for prostate cancer no sign of recurrence denies bone pain.    The patient's 1 brother identical twin has had Crohn's disease plus prostate cancer.    Colonoscopy dated September 23, 2016 with Dr. GORDILLO at colorectal surgery [facility].    ROS: A comprehensive review of systems was performed and was otherwise negative    Medications:  Prior to Admission medications    Medication Sig Start Date End Date Taking? Authorizing Provider   amLODIPine (NORVASC) 5 MG tablet Take 1 tablet (5 mg total) by mouth daily. 2/26/20  Yes Florentin Bill MD   aspirin 81 mg chewable tablet Chew 1 tablet (81 mg total) daily. 12/5/19  Yes Beverly Wu MD   atorvastatin (LIPITOR) 10 MG tablet Take 1 tablet (10 mg total) by mouth at bedtime. 2/26/20  Yes Florentin Bill MD   lisinopriL (PRINIVIL,ZESTRIL) 20 MG tablet Take 1 tablet (20 mg total) by mouth daily. 2/26/20  Yes Florentin Bill MD   metoprolol  succinate (TOPROL-XL) 50 MG 24 hr tablet Take 1 tablet (50 mg total) by mouth daily. 11/22/19  Yes Florentin Bill MD   nitroglycerin (NITROSTAT) 0.4 MG SL tablet Place 1 tablet (0.4 mg total) under the tongue every 5 (five) minutes as needed for chest pain. 12/4/19  Yes Beverly Wu MD       Allergies:   Allergies   Allergen Reactions     Sulfa (Sulfonamide Antibiotics) Rash       Immunizations:   Immunization History   Administered Date(s) Administered     Hep A, historic 01/10/2008     Hep B, Adult 08/11/2015, 09/11/2015, 02/23/2016     Influenza high dose,seasonal,PF, 65+ yrs 09/11/2015, 10/14/2016, 09/30/2017     Influenza, nasal, historic 09/17/2018     Japanese Encephalitis, IM 08/16/2010, 09/09/2010, 09/05/2013     Pneumo Conj 13-V (2010&after) 07/20/2015     Pneumo Polysac 23-V 07/21/2016     Td, Adult, Absorbed 06/02/2004     Td,adult,historic,unspecified 06/02/2004, 03/02/2009     Tdap 03/02/2009     Typhoid Live, Oral 01/10/2008, 09/05/2013     ZOSTER, LIVE 01/07/2015       Exam Chest clear to auscultation and percussion.  Heart tones regular rhythm without murmur rub or gallop.  Abdomen soft nontender no organomegaly.  No peritoneal signs.  Extremities free of edema cyanosis or clubbing.  Neck veins nondistended no thyromegaly or scleral icterus noted, carotids full.  Skin warm and dry easily conversant good spirited.  Normal intelligence.  Neurologically intact no gross localizing findings.    110/68 pulse 75 respirations 18 O2 sats not listed.  Weight 203 pounds same as last visit.  Allergies sulfa.    Assessment and Plan  Hypertension controlled advised patient continue same meds.    Osteoarthritis generalized rheumatology consult in the often.    Recent diagnosis of acute coronary syndrome and ischemic cardiomyopathy unable to travel to [facility] and forms completed for same.    Prostate cancer by history no clinical evidence of recurrence.    Sulfa allergy.    Time: total time spent with  the patient was 25 minutes of which >50% was spent in counseling and coordination of care    The following high BMI interventions were performed this visit: encouragement to exercise    Florentin Bill MD    Patient Active Problem List   Diagnosis     Reactions To Sulfa Drugs     Adenocarcinoma Of The Prostate Gland     Essential hypertension     Elevated troponin     ACS (acute coronary syndrome) (H)     Pleuritic chest pain     Cardiomyopathy, unspecified type (H)

## 2021-06-07 NOTE — TELEPHONE ENCOUNTER
Referral Request  Type of referral:Rhumatology   Who s requesting: Patient  Why the request: neck pain   Have you been seen for this request: Yes Urgency Room in    Does patient have a preference on a group/provider? No preference   Okay to leave a detailed message?  Yes

## 2021-06-07 NOTE — PROGRESS NOTES
"Chan Bishop is a 69 y.o. male who is being evaluated via a billable telephone visit.      The patient has been notified of following:     \"This telephone visit will be conducted via a call between you and your physician/provider. We have found that certain health care needs can be provided without the need for a physical exam.  This service lets us provide the care you need with a short phone conversation.  If a prescription is necessary we can send it directly to your pharmacy.  If lab work is needed we can place an order for that and you can then stop by our lab to have the test done at a later time.    If during the course of the call the physician/provider feels a telephone visit is not appropriate, you will not be charged for this service.\"         Chan Bishop complains of    Chief Complaint   Patient presents with     Medication Questions     Atorvastatin     Muscle Pain     neck,shoulder, right groin and lower back       I have reviewed and updated the patient's Past Medical History, Social History, Family History and Medication List.    ALLERGIES  Sulfa (sulfonamide antibiotics)    Additional provider notes: Intolerant of atorvastatin.  History of acute coronary syndrome December 2019.  Needs to be on a statin.  Has ischemic cardiomyopathy.    Assessment/Plan:  Coronary artery disease with history of acute coronary syndrome and ischemic cardiomyopathy stop atorvastatin because of myalgias arthralgias and use rosuvastatin 10 mg daily.  Call in 1 month's time regarding status.      Phone call duration:  11 minutes    Gloria Tran, [facility]    "

## 2021-06-10 NOTE — TELEPHONE ENCOUNTER
Refill Approved    Rx renewed per Medication Renewal Policy. Medication was last renewed on 11/22/19.    Brigida Lee, [facility] Triage/Med Refill 8/14/2020     Requested Prescriptions   Pending Prescriptions Disp Refills     metoprolol succinate (TOPROL-XL) 50 MG 24 hr tablet [Pharmacy Med Name: Metoprolol Succinate ER Oral Tablet Extended Release 24 Hour 50 MG] 90 tablet 0     Sig: Take 1 tablet (50 mg total) by mouth daily.       Beta-Blockers Refill Protocol Passed - 8/14/2020  9:35 AM        Passed - PCP or prescribing provider visit in past 12 months or next 3 months     Last office visit with prescriber/PCP: 3/17/2020 Florentin Bill MD OR same dept: 3/17/2020 Florentin Bill MD OR same specialty: 3/17/2020 Florentin Bill MD  Last physical: 8/15/2019 Last MTM visit: Visit date not found   Next visit within 3 mo: Visit date not found  Next physical within 3 mo: Visit date not found  Prescriber OR PCP: Florentin Bill MD  Last diagnosis associated with med order: 1. Essential hypertension  - metoprolol succinate (TOPROL-XL) 50 MG 24 hr tablet [Pharmacy Med Name: Metoprolol Succinate ER Oral Tablet Extended Release 24 Hour 50 MG]; Take 1 tablet (50 mg total) by mouth daily.  Dispense: 90 tablet; Refill: 0    If protocol passes may refill for 12 months if within 3 months of last provider visit (or a total of 15 months).             Passed - Blood pressure filed in past 12 months     BP Readings from Last 1 Encounters:   03/17/20 110/68

## 2021-06-10 NOTE — PROGRESS NOTES
Assessment and Plan:   Annual wellness visit    1. Routine general medical examination at a health care facility  Annual wellness visit  - 2(CBC w/o Differential)  - Comprehensive Metabolic Panel  - Lipid Cascade  - Thyroid Stimulating Hormone (TSH)  - Urinalysis-UC if Indicated  - COVID-19 Virus (Coronavirus) Antibody & Titer Reflex; Future    2. Screening for prostate cancer  Annual wellness visit and screen for prostate cancer past health history of prostate cancer resected.  Robotic.  No sign of recurrence.  Dr. EUBANKS [facility].  - PSA (Prostatic-Specific Antigen), Annual Screen    3. Essential hypertension  Annual wellness visit  - 2(CBC w/o Differential)  - Comprehensive Metabolic Panel  - Lipid Cascade  - Thyroid Stimulating Hormone (TSH)  - Urinalysis-UC if Indicated  - PSA (Prostatic-Specific Antigen), Annual Screen  - COVID-19 Virus (Coronavirus) Antibody & Titer Reflex; Future     The patient's current medical problems were reviewed.    I have had an Advance Directives discussion with the patient.  The following health maintenance schedule was reviewed with the patient and provided in printed form in the after visit summary:   Health Maintenance   Topic Date Due     HEPATITIS C SCREENING  1950     ZOSTER VACCINES (2 of 3) 03/04/2015     TD 18+ HE  03/02/2019     INFLUENZA VACCINE RULE BASED (1) 08/01/2020     FALL RISK ASSESSMENT  08/15/2020     MEDICARE ANNUAL WELLNESS VISIT  08/15/2020     ADVANCE CARE PLANNING  08/15/2024     LIPID  12/09/2024     COLORECTAL CANCER SCREENING  09/23/2026     PNEUMOCOCCAL IMMUNIZATION 65+ LOW/MEDIUM RISK  Completed     HEPATITIS B VACCINES  Aged Out        Subjective:   Chief Complaint: Chan Bishop is an 70 y.o. male here for an Annual Wellness visit.   HPI: Annual wellness visit for this 70-year-old male retired.  Graduate of [employer].  Sick with inflammatory osteoarthritis seen by Dr. Dooley.  Previous history of myocarditis  2019.  On diclofenac 75 mg daily.    Colonoscopy dated 2016 normal.  Non-smoker no excess alcohol.  Sulfa allergy.    History of prostate cancer resected robotic no sign of recurrence Dr. RAIMUNDO [facility].  Immunizations reviewed and up-to-date.  Prior history of fractured ankle with no surgery.    Left hip arthroplasty for osteoarthritis.  Mother  ovarian cancer age 45.    1 sister  of brain cancer 48.  Father  83 after a fall he had Alzheimer's.    2 children well wife well.  Wife has history of coronary vasospasm and acute myocardial injury secondary to same.  Living well.  2 children well 2 grandchildren well.  Enjoys travel has been to [place] [employer].    Review of Systems:    Please see above.  The rest of the review of systems are negative for all systems.    Patient Care Team:  Folrentin Bill MD as PCP - General  Florentin Bill MD as Assigned PCP     Patient Active Problem List   Diagnosis     Reactions To Sulfa Drugs     Adenocarcinoma Of The Prostate Gland     Essential hypertension     Elevated troponin     ACS (acute coronary syndrome) (H)     Pleuritic chest pain     Cardiomyopathy, unspecified type (H)     Past Medical History:   Diagnosis Date     HTN (hypertension)      Hyperlipidemia      Prostate CA (H)       Past Surgical History:   Procedure Laterality Date     CV CORONARY ANGIOGRAM N/A 12/3/2019    Procedure: Coronary Angiogram;  Surgeon: Sheryl Mccormick MD;  Location: [facility] Cath Lab;  Service: Cardiology     CV LEFT HEART CATHETERIZATION WO LEFT VETRICULOGRAM Left 12/3/2019    Procedure: Left Heart Catheterization Without Left Ventriculogram;  Surgeon: Sheryl Mccormick MD;  Location: [facility] Cath Lab;  Service: Cardiology     HIP SURGERY       PROSTATECTOMY        Family History   Problem Relation Age of Onset     Ovarian cancer Mother       Social History     Socioeconomic History     Marital status:      Spouse name: Ledy      Number of children: 2     Years of education: Not on file     Highest education level: Not on file   Occupational History     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   Social Needs     Financial resource strain: Not on file     Food insecurity     Worry: Not on file     Inability: Not on file     Transportation needs     Medical: Not on file     Non-medical: Not on file   Tobacco Use     Smoking status: Never Smoker     Smokeless tobacco: Never Used     Tobacco comment: /70 pulse 76 yesterday per wife   Substance and Sexual Activity     Alcohol use: Yes     Comment: rare     Drug use: No     Sexual activity: Not on file   Lifestyle     Physical activity     Days per week: Not on file     Minutes per session: Not on file     Stress: Not on file   Relationships     Social connections     Talks on phone: Not on file     Gets together: Not on file     Attends Jew service: Not on file     Active member of club or organization: Not on file     Attends meetings of clubs or organizations: Not on file     Relationship status: Not on file     Intimate partner violence     Fear of current or ex partner: Not on file     Emotionally abused: Not on file     Physically abused: Not on file     Forced sexual activity: Not on file   Other Topics Concern     Not on file   Social History Narrative     40 yrs, Ledy,  2 kids; commercial printing    Non smoker/occ  drinker      Current Outpatient Medications   Medication Sig Dispense Refill     amLODIPine (NORVASC) 5 MG tablet Take 1 tablet (5 mg total) by mouth daily. 90 tablet 3     aspirin 81 mg chewable tablet Chew 1 tablet (81 mg total) daily.  0     lisinopriL (PRINIVIL,ZESTRIL) 20 MG tablet Take 1 tablet (20 mg total) by mouth daily. 90 tablet 3     lovastatin (MEVACOR) 10 MG tablet Take 1 tablet (10 mg total) by mouth at bedtime. 90 tablet 3     magnesium 30 mg tablet Take 30 mg by mouth 2 (two) times a day.       metoprolol succinate (TOPROL-XL) 50 MG 24 hr  "tablet Take 1 tablet (50 mg total) by mouth daily. 90 tablet 2     diclofenac (VOLTAREN) 75 MG EC tablet Take 75 mg by mouth 2 (two) times a day.       nitroglycerin (NITROSTAT) 0.4 MG SL tablet Place 1 tablet (0.4 mg total) under the tongue every 5 (five) minutes as needed for chest pain. 30 tablet 0     No current facility-administered medications for this visit.       Objective:   Vital Signs:   Visit Vitals  /80 (Patient Site: Right Arm, Patient Position: Sitting)   Pulse (!) 59   Temp 97.8  F (36.6  C)   Ht 5' 10.5\" (1.791 m)   Wt 197 lb (89.4 kg)   SpO2 98%   BMI 27.87 kg/m           VisionScreening:  No exam data present     PHYSICAL EXAM  Chest clear to auscultation and percussion.  Heart tones regular rhythm without murmur rub or gallop.  Abdomen soft nontender no organomegaly.  No peritoneal signs.  Extremities free of edema cyanosis or clubbing.  Neck veins nondistended no thyromegaly or scleral icterus noted, carotids full.  Skin warm and dry easily conversant good spirited.  Normal intelligence.  Neurologically intact no gross localizing findings.  Entirety genital exam negative no prostate tissue palpable skin negative lymph negative neuro negative psych normal HEENT negative wears glasses appears younger than stated age accessory nipple right side good pulse noted in all 4 extremities no carotid bruits thyromegaly.    124/80 pulse 60 respirations 18 O2 sats 98% temperature this morning 97.8 degrees.  Weight down 6 pounds from previous.  He appears well no acute distress try excellent neuromuscular tone exercises regularly.  Enjoys traveling and hiking.    Assessment Results 8/17/2020   Activities of Daily Living No help needed   Instrumental Activities of Daily Living No help needed   Get Up and Go Score Less than 12 seconds   Mini Cog Total Score 3   Some recent data might be hidden     A Mini-Cog score of 0-2 suggests the possibility of dementia, score of 3-5 suggests no " dementia        Identified Health Risks:     The patient was counseled and encouraged to consider modifying their diet and eating habits. He was provided with information on recommended healthy diet options.  Patient's advanced directive was discussed and I am comfortable with the patient's wishes.

## 2021-06-12 NOTE — PROGRESS NOTES
Assessment and Plan:   Annual wellness visit.    1. Routine general medical examination at a health care facility  Annual wellness visit and physical exam.  - HM2(CBC w/o Differential)  - Comprehensive Metabolic Panel  - Thyroid Stimulating Hormone (TSH)  - Urinalysis-UC if Indicated  - PSA (Prostatic-Specific Antigen), Annual Screen  - Lipid Cascade      The patient's current medical problems were reviewed.    I have had an Advance Directives discussion with the patient.  The following health maintenance schedule was reviewed with the patient and provided in printed form in the after visit summary:   Health Maintenance   Topic Date Due     ZOSTER VACCINE  2010     COLONOSCOPY  2016     FALL RISK ASSESSMENT  2017     INFLUENZA VACCINE RULE BASED (1) 2017     TD 18+ HE  2019     ADVANCE DIRECTIVES DISCUSSED WITH PATIENT  2020     PNEUMOCOCCAL POLYSACCHARIDE VACCINE AGE 65 AND OVER  Completed     PNEUMOCOCCAL CONJUGATE VACCINE FOR ADULTS (PCV13 OR PREVNAR)  Completed        Subjective:   Chief Complaint: Chan Bishop is an 67 y.o. male here for an Annual Wellness visit.   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  Normal colonoscopy done in 2016 formal report pending prior to that colonoscopy done 2006 all clear.    Non-smoker no excess alcohol.  Sulfa allergy in fact he does not use alcohol.    Immunizations reviewed and up-to-date.    Prostate cancer status post prostatectomy with no sign of recurrence followed by his urologist at [facility] urology Dr. EUBANKS.    Hypertension.  Fractured ankle no surgery.    Mother  of ovarian cancer age 45.  Father  at age 83 of Alzheimer's disease after a fall.    2 children well second grandchild on the way.  Wife is living well a registered nurse works for [employer] has a history of coronary vasospasm.    Enjoys travel.  [employer] this spring [place] this fall.    Review of Systems:    Please see  "above.  The rest of the review of systems are negative for all systems.    Patient Care Team:  Florentin Bill MD as PCP - General     Patient Active Problem List   Diagnosis     Reactions To Sulfa Drugs     Adenocarcinoma Of The Prostate Gland     Essential Hypertension     No past medical history on file.   No past surgical history on file.   No family history on file.   Social History     Social History     Marital status:      Spouse name: N/A     Number of children: N/A     Years of education: N/A     Occupational History     Not on file.     Social History Main Topics     Smoking status: Never Smoker     Smokeless tobacco: Never Used     Alcohol use No     Drug use: No     Sexual activity: Not on file     Other Topics Concern     Not on file     Social History Narrative      Current Outpatient Prescriptions   Medication Sig Dispense Refill     amLODIPine-benazepril (LOTREL 5-20) 5-20 mg per capsule Take 1 capsule by mouth daily. 90 capsule 0     No current facility-administered medications for this visit.       Objective:   Vital Signs:   Visit Vitals     /78     Pulse 72     Ht 5' 10.5\" (1.791 m)     Wt 200 lb (90.7 kg)     BMI 28.29 kg/m2        VisionScreening:  No exam data present     PHYSICAL EXAM  Chest clear to auscultation and percussion.  Heart tones regular rhythm without murmur rub or gallop.  Abdomen soft nontender no organomegaly.  No peritoneal signs.  Extremities free of edema cyanosis or clubbing.  Neck veins nondistended no thyromegaly or scleral icterus noted, carotids full.  Skin warm and dry easily conversant good spirited.  Normal intelligence.  Neurologically intact no gross localizing findings.  Rest of examination negative in its entirety including skin negative lymph negative neuro negative psych normal HEENT negative wears glasses genital rectal exam negative prostate surgically absent good pulses noted in all 4 extremities rest examination negative in its entirety.  " No carotid bruits or thyromegaly.    Assessment Results 7/24/2017   Activities of Daily Living No help needed   Instrumental Activities of Daily Living No help needed   Get Up and Go Score Less than 12 seconds   Mini Cog Total Score 5   Some recent data might be hidden     A Mini-Cog score of 0-2 suggests the possibility of dementia, score of 3-5 suggests no dementia    Identified Health Risks:     The patient was counseled and encouraged to consider modifying their diet and eating habits. He was provided with information on recommended healthy diet options.  The patient was provided with written information regarding signs of hearing loss.  Information regarding advance directives (living [place]), including where he can download the appropriate form, was provided to the patient via the AVS.

## 2021-06-13 NOTE — PROGRESS NOTES
Office Visit - Follow up    Chan Bishop   67 y.o. male    Date of Visit: 11/6/2017    Chief Complaint   Patient presents with     Numbness     right foot x 4 months  feels asleep right  now       Subjective: Neuropathy.  Right foot numb 4 months fails like it is asleep right now it feels numb.    5 years ago fractured his right ankle.  Prior history of lumbar back disease as well does not abuse alcohol no history of diabetes.    Sulfa allergy.    Denies chest pain or shortness of breath no blood in stool or urine.    Normal colonoscopy with colorectal surgery group September 23, 2016.    ROS: A comprehensive review of systems was performed and was otherwise negative    Medications:  Prior to Admission medications    Medication Sig Start Date End Date Taking? Authorizing Provider   amLODIPine-benazepril (LOTREL 5-20) 5-20 mg per capsule TAKE ONE CAPSULE BY MOUTH ONE TIME DAILY  8/18/17   Florentin Bill MD       Allergies:   Allergies   Allergen Reactions     Sulfa (Sulfonamide Antibiotics)        Immunizations:   Immunization History   Administered Date(s) Administered     DT (pediatric) 06/02/2004     Hep A, historic 01/10/2008     Pneumo Conj 13-V (2010&after) 07/20/2015     Pneumo Polysac 23-V 07/21/2016     Td, historic 06/02/2004, 03/02/2009     Tdap 03/02/2009       Exam Chest clear to auscultation and percussion.  Heart tones regular rhythm without murmur rub or gallop.  Abdomen soft nontender no organomegaly.  No peritoneal signs.  Extremities free of edema cyanosis or clubbing.  Neck veins nondistended no thyromegaly or scleral icterus noted, carotids full.  Skin warm and dry easily conversant good spirited.  Normal intelligence.  Neurologically intact no gross localizing findings.  Distal pulses right lower extremity at the dorsalis pedis posterior tibial intact neuromuscular strength right leg gastrocnemius all good no atrophy no fasciculations.  Appears well excellent neuromuscular  tone.    Assessment and Plan  Neuropathy or nerve root impingement after ankle fracture previous.  Suggest neurologic consultation.    Overweight BMI 28+.    Sulfa allergy.    Hypertension controlled.    Time: total time spent with the patient was 25 minutes of which >50% was spent in counseling and coordination of care    The following high BMI interventions were performed this visit: encouragement to exercise    Florentin Bill MD    Patient Active Problem List   Diagnosis     Reactions To Sulfa Drugs     Adenocarcinoma Of The Prostate Gland     Essential Hypertension

## 2021-06-13 NOTE — TELEPHONE ENCOUNTER
PCP please review patient's mychart message and advise as appropriate a upon return, thanks much.

## 2021-06-13 NOTE — TELEPHONE ENCOUNTER
Wellness Screening Tool  Symptom Screening:  Do you have one of the following NEW symptoms:    Fever (subjective or >100.0)?  No    A new cough?  No    Shortness of breath?  No     Chills? No     New loss of taste or smell? No     Generalized body aches? No     New persistent headache? No     New sore throat? No     Nausea, vomiting, or diarrhea?  No    Within the past 2 weeks, have you been exposed to someone with a known positive illness below:    COVID-19 (known or suspected)?  No    Chicken pox?  No    Mealses?  No    Pertussis?  No    Patient notified of visitor policy- No visitors are allowed to accompany the patient at this time. Yes  Pt informed to wear a mask: Yes  Pt notified if they develop any symptoms listed above, prior to their appointment, they are to call the clinic directly at [phone] for further instructions.  Yes  Patient's appointment status: Patient will be seen in clinic as scheduled on 12/4

## 2021-06-15 NOTE — PROGRESS NOTES
Office Visit - Follow up    Chan Bishop   67 y.o. male    Date of Visit: 2/2/2018    Chief Complaint   Patient presents with     Hypertension     Numbness     follow up right foot  had EMG       Subjective: Sciatica.  Right side.  EMG test done reviewed from the number and neurologic clinic in [place] this was done December 13, 2017 reviewed in detail.  Looks like L5-S1 sciatica-like distribution.    Hypertension control.  No history of target organ damage secondary to same.  Blood pressure today 120/74.  Denies headache dizziness or lightheadedness no chest pain or shortness of breath.    No blood in stool or urine medication list reviewed well-tolerated.    Colonoscopy dated September 23, 2016 normal with colorectal surgery group.    ROS: A comprehensive review of systems was performed and was otherwise negative    Medications:  Prior to Admission medications    Medication Sig Start Date End Date Taking? Authorizing Provider   amLODIPine-benazepril (LOTREL 5-20) 5-20 mg per capsule TAKE ONE CAPSULE BY MOUTH ONE TIME DAILY  11/16/17  Yes Florentin Bill MD   acyclovir (ZOVIRAX) 5 % ointment Thin layer tid prn 11/29/17   Florentin Bill MD       Allergies:   Allergies   Allergen Reactions     Sulfa (Sulfonamide Antibiotics) Rash       Immunizations:   Immunization History   Administered Date(s) Administered     DT (pediatric) 06/02/2004     Hep A, historic 01/10/2008     Pneumo Conj 13-V (2010&after) 07/20/2015     Pneumo Polysac 23-V 07/21/2016     Td,adult,historic,unspecified 06/02/2004, 03/02/2009     Tdap 03/02/2009       Exam Chest clear to auscultation and percussion.  Heart tones regular rhythm without murmur rub or gallop.  Abdomen soft nontender no organomegaly.  No peritoneal signs.  Extremities free of edema cyanosis or clubbing.  Neck veins nondistended no thyromegaly or scleral icterus noted, carotids full.  Skin warm and dry easily conversant good spirited.  Normal intelligence.   Neurologically intact no gross localizing findings.      Assessment and Plan  Hypertension controlled.    Sciatica right side with EMG suggesting same L5-S1 nerve root distribution suggest consultation with orthopedic group Dr. Jean Welch at [facility].    Sulfa allergy.    Prostate cancer by history no evidence of recurrence.  Status post prostatectomy.  O2 sats today 98%.  RTC 6 weeks time.    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

## 2021-06-15 NOTE — TELEPHONE ENCOUNTER
Refill Approved    Rx renewed per Medication Renewal Policy. Medication was last renewed on 2/26/20.    Chau Ludwig, [facility] Triage/Med Refill 2/16/2021     Requested Prescriptions   Pending Prescriptions Disp Refills     amLODIPine (NORVASC) 5 MG tablet [Pharmacy Med Name: amLODIPine Besylate Oral Tablet 5 MG] 90 tablet 0     Sig: Take 1 tablet (5 mg total) by mouth daily.       Calcium-Channel Blockers Protocol Passed - 2/16/2021 10:34 AM        Passed - PCP or prescribing provider visit in past 12 months or next 3 months     Last office visit with prescriber/PCP: 3/17/2020 Florentin Bill MD OR same dept: 3/17/2020 Florentin Bill MD OR same specialty: 3/17/2020 Florentin Bill MD  Last physical: 8/17/2020 Last MTM visit: Visit date not found   Next visit within 3 mo: Visit date not found  Next physical within 3 mo: Visit date not found  Prescriber OR PCP: Florentin Bill MD  Last diagnosis associated with med order: 1. Essential hypertension  - amLODIPine (NORVASC) 5 MG tablet [Pharmacy Med Name: amLODIPine Besylate Oral Tablet 5 MG]; Take 1 tablet (5 mg total) by mouth daily.  Dispense: 90 tablet; Refill: 0  - lisinopriL (PRINIVIL,ZESTRIL) 20 MG tablet [Pharmacy Med Name: Lisinopril Oral Tablet 20 MG]; Take 1 tablet (20 mg total) by mouth daily.  Dispense: 90 tablet; Refill: 0    If protocol passes may refill for 12 months if within 3 months of last provider visit (or a total of 15 months).             Passed - Blood pressure filed in past 12 months     BP Readings from Last 1 Encounters:   12/04/20 126/84                lisinopriL (PRINIVIL,ZESTRIL) 20 MG tablet [Pharmacy Med Name: Lisinopril Oral Tablet 20 MG] 90 tablet 0     Sig: Take 1 tablet (20 mg total) by mouth daily.       Ace Inhibitors Refill Protocol Passed - 2/16/2021 10:34 AM        Passed - PCP or prescribing provider visit in past 12 months       Last office visit with prescriber/PCP: 3/17/2020 Florentin Bill MD  OR same dept: 3/17/2020 Florentin Bill MD OR same specialty: 3/17/2020 Florentin Bill MD  Last physical: 8/17/2020 Last MTM visit: Visit date not found   Next visit within 3 mo: Visit date not found  Next physical within 3 mo: Visit date not found  Prescriber OR PCP: Florentin Bill MD  Last diagnosis associated with med order: 1. Essential hypertension  - amLODIPine (NORVASC) 5 MG tablet [Pharmacy Med Name: amLODIPine Besylate Oral Tablet 5 MG]; Take 1 tablet (5 mg total) by mouth daily.  Dispense: 90 tablet; Refill: 0  - lisinopriL (PRINIVIL,ZESTRIL) 20 MG tablet [Pharmacy Med Name: Lisinopril Oral Tablet 20 MG]; Take 1 tablet (20 mg total) by mouth daily.  Dispense: 90 tablet; Refill: 0    If protocol passes may refill for 12 months if within 3 months of last provider visit (or a total of 15 months).             Passed - Serum Potassium in past 12 months     Lab Results   Component Value Date    Potassium 4.5 08/17/2020             Passed - Blood pressure filed in past 12 months     BP Readings from Last 1 Encounters:   12/04/20 126/84             Passed - Serum Creatinine in past 12 months     Creatinine   Date Value Ref Range Status   08/17/2020 1.07 0.70 - 1.30 mg/dL Final

## 2021-06-16 PROBLEM — I24.9 ACS (ACUTE CORONARY SYNDROME) (H): Status: ACTIVE | Noted: 2019-12-02

## 2021-06-16 PROBLEM — B33.22 VIRAL MYOCARDITIS: Status: ACTIVE | Noted: 2020-12-04

## 2021-06-16 PROBLEM — R79.89 ELEVATED TROPONIN: Status: ACTIVE | Noted: 2019-12-02

## 2021-06-16 PROBLEM — R07.81 PLEURITIC CHEST PAIN: Status: ACTIVE | Noted: 2019-12-02

## 2021-06-16 NOTE — TELEPHONE ENCOUNTER
Refill Approved    Rx renewed per Medication Renewal Policy. Medication was last renewed on 4/14/20.    Chau Ludwig, [facility] Triage/Med Refill 4/5/2021     Requested Prescriptions   Pending Prescriptions Disp Refills     lovastatin (MEVACOR) 10 MG tablet [Pharmacy Med Name: Lovastatin Oral Tablet 10 MG] 90 tablet 0     Sig: Take 1 tablet (10 mg total) by mouth at bedtime.       Statins Refill Protocol (Hmg CoA Reductase Inhibitors) Passed - 4/3/2021  9:12 AM        Passed - PCP or prescribing provider visit in past 12 months      Last office visit with prescriber/PCP: 3/17/2020 Florentin Bill MD OR same dept: Visit date not found OR same specialty: 3/17/2020 Florentin Bill MD  Last physical: 8/17/2020 Last MTM visit: Visit date not found   Next visit within 3 mo: Visit date not found  Next physical within 3 mo: Visit date not found  Prescriber OR PCP: Florentin Bill MD  Last diagnosis associated with med order: 1. Metabolic syndrome  - lovastatin (MEVACOR) 10 MG tablet [Pharmacy Med Name: Lovastatin Oral Tablet 10 MG]; Take 1 tablet (10 mg total) by mouth at bedtime.  Dispense: 90 tablet; Refill: 0    If protocol passes may refill for 12 months if within 3 months of last provider visit (or a total of 15 months).

## 2021-06-17 NOTE — TELEPHONE ENCOUNTER
Refill Approved    Rx renewed per Medication Renewal Policy. Medication was last renewed on 8/14/20.    Chau Ludwig, [facility] Triage/Med Refill 5/14/2021     Requested Prescriptions   Pending Prescriptions Disp Refills     metoprolol succinate (TOPROL-XL) 50 MG 24 hr tablet [Pharmacy Med Name: Metoprolol Succinate ER Oral Tablet Extended Release 24 Hour 50 MG] 90 tablet 0     Sig: Take 1 tablet (50 mg total) by mouth daily.       Beta-Blockers Refill Protocol Passed - 5/14/2021  9:18 AM        Passed - PCP or prescribing provider visit in past 12 months or next 3 months     Last office visit with prescriber/PCP: 3/17/2020 Florentin Bill MD OR same dept: Visit date not found OR same specialty: 3/17/2020 Florentin Bill MD  Last physical: 8/17/2020 Last MTM visit: Visit date not found   Next visit within 3 mo: Visit date not found  Next physical within 3 mo: Visit date not found  Prescriber OR PCP: Florentin Bill MD  Last diagnosis associated with med order: 1. Essential hypertension  - metoprolol succinate (TOPROL-XL) 50 MG 24 hr tablet [Pharmacy Med Name: Metoprolol Succinate ER Oral Tablet Extended Release 24 Hour 50 MG]; Take 1 tablet (50 mg total) by mouth daily.  Dispense: 90 tablet; Refill: 0    If protocol passes may refill for 12 months if within 3 months of last provider visit (or a total of 15 months).             Passed - Blood pressure filed in past 12 months     BP Readings from Last 1 Encounters:   12/04/20 126/84

## 2021-06-17 NOTE — PATIENT INSTRUCTIONS - HE
Patient Instructions by Florentin Bill MD at 8/15/2019  8:20 AM     Author: Florentin Bill MD Service: -- Author Type: Physician    Filed: 8/15/2019  8:39 AM Encounter Date: 8/15/2019 Status: Signed    : Florentin Bill MD (Physician)         Patient Education   Understanding USDA MyPlate  The USDA (US Department of Agriculture) has guidelines to help you make healthy food choices. These are called MyPlate. MyPlate shows the food groups that make up healthy meals using the image of a place setting. Before you eat, think about the healthiest choices for what to put onto your plate or into your cup or bowl. To learn more about building a healthy plate, visit www.choosemyplate.gov.       The Food Groups    Fruits: Any fruit or 100% fruit juice counts as part of the Fruit Group. Fruits may be fresh, canned, frozen, or dried, and may be whole, cut-up, or pureed. Make half your plate fruits and vegetables.    Vegetables: Any vegetable or 100% vegetable juice counts as a member of the Vegetable Group. Vegetables may be fresh, frozen, canned, or dried. They can be served raw or cooked and may be whole, cut-up, or mashed. Make half your plate fruits and vegetables.     Grains: All foods made from grains are part of the Grains Group. These include wheat, rice, oats, cornmeal, and barley such as bread, pasta, oatmeal, cereal, tortillas, and grits. Grains should be no more than a quarter of your plate. At least half of your grains should be whole grains.    Protein: This group includes meat, poultry, seafood, beans and peas, eggs, processed soy products (like tofu), nuts (including nut butters), and seeds. Make protein choices no more than a quarter of your plate. Meat and poultry choices should be lean or low fat.    Dairy: All fluid milk products and foods made from milk that contain calcium, like yogurt and cheese are part of the Dairy Group. (Foods that have little calcium, such as cream, butter, and  cream cheese, are not part of the group.) Most dairy choices should be low-fat or fat-free.    Oils: These are fats that are liquid at room temperature. They include canola, corn, olive, soybean, and sunflower oil. Foods that are mainly oil include mayonnaise, certain salad dressings, and soft margarines. You should have only 5 to 7 teaspoons of oils a day. You probably already get this much from the food you eat.  Use Funium to Help Build Your Meals  The [employer] can help you plan and track your meals and activity. You can look up individual foods to see or compare their nutritional value. You can get guidelines for what and how much you should eat. You can compare your food choices. And you can assess personal physical activities and see ways you can improve. Go to www.[employer].gov/"[employer]cker/.    2740-2244 [employer]. [address] [place] [address]. All rights reserved. This information is not intended as a substitute for professional medical care. Always follow your healthcare professional's instructions.             Advance Directive  Patients advance directive was discussed and I am comfortable with the patients wishes.  Patient Education   Personalized Prevention Plan  You are due for the preventive services outlined below.  Your care team is available to assist you in scheduling these services.  If you have already completed any of these items, please share that information with your care team to update in your medical record.  Health Maintenance   Topic Date Due   ? HEPATITIS C SCREENING  1950   ? ZOSTER VACCINES (2 of 3) 03/04/2015   ? TD 18+ HE  03/02/2019   ? INFLUENZA VACCINE RULE BASED (1) 08/01/2019   ? FALL RISK ASSESSMENT  08/03/2019   ? MEDICARE ANNUAL WELLNESS VISIT  08/03/2019   ? ADVANCE CARE PLANNING  08/03/2023   ? COLONOSCOPY  09/23/2026   ? PNEUMOCOCCAL POLYSACCHARIDE VACCINE AGE 65 AND OVER  Completed   ? PNEUMOCOCCAL CONJUGATE VACCINE FOR  ADULTS (PCV13 OR PREVNAR)  Completed

## 2021-06-18 NOTE — PATIENT INSTRUCTIONS - HE
Patient Instructions by Florentin Bill MD at 8/17/2020  8:00 AM     Author: Florentin Bill MD Service: -- Author Type: Physician    Filed: 8/17/2020  8:21 AM Encounter Date: 8/17/2020 Status: Signed    : Florentin Bill MD (Physician)         Patient Education   Understanding USDA MyPlate  The USDA (US Department of Agriculture) has guidelines to help you make healthy food choices. These are called MyPlate. MyPlate shows the food groups that make up healthy meals using the image of a place setting. Before you eat, think about the healthiest choices for what to put onto your plate or into your cup or bowl. To learn more about building a healthy plate, visit www.choosemyplate.gov.       The Food Groups    Fruits: Any fruit or 100% fruit juice counts as part of the Fruit Group. Fruits may be fresh, canned, frozen, or dried, and may be whole, cut-up, or pureed. Make half your plate fruits and vegetables.    Vegetables: Any vegetable or 100% vegetable juice counts as a member of the Vegetable Group. Vegetables may be fresh, frozen, canned, or dried. They can be served raw or cooked and may be whole, cut-up, or mashed. Make half your plate fruits and vegetables.     Grains: All foods made from grains are part of the Grains Group. These include wheat, rice, oats, cornmeal, and barley such as bread, pasta, oatmeal, cereal, tortillas, and grits. Grains should be no more than a quarter of your plate. At least half of your grains should be whole grains.    Protein: This group includes meat, poultry, seafood, beans and peas, eggs, processed soy products (like tofu), nuts (including nut butters), and seeds. Make protein choices no more than a quarter of your plate. Meat and poultry choices should be lean or low fat.    Dairy: All fluid milk products and foods made from milk that contain calcium, like yogurt and cheese are part of the Dairy Group. (Foods that have little calcium, such as cream, butter, and  cream cheese, are not part of the group.) Most dairy choices should be low-fat or fat-free.    Oils: These are fats that are liquid at room temperature. They include canola, corn, olive, soybean, and sunflower oil. Foods that are mainly oil include mayonnaise, certain salad dressings, and soft margarines. You should have only 5 to 7 teaspoons of oils a day. You probably already get this much from the food you eat.  Use Chirply to Help Build Your Meals  The [employer] can help you plan and track your meals and activity. You can look up individual foods to see or compare their nutritional value. You can get guidelines for what and how much you should eat. You can compare your food choices. And you can assess personal physical activities and see ways you can improve. Go to www.[employer].gov/[employer]/.    8317-6667 [employer]. [address] [place] [address]. All rights reserved. This information is not intended as a substitute for professional medical care. Always follow your healthcare professional's instructions.             Advance Directive  Patients advance directive was discussed and I am comfortable with the patients wishes.  Patient Education   Personalized Prevention Plan  You are due for the preventive services outlined below.  Your care team is available to assist you in scheduling these services.  If you have already completed any of these items, please share that information with your care team to update in your medical record.  Health Maintenance   Topic Date Due   ? HEPATITIS C SCREENING  1950   ? ZOSTER VACCINES (2 of 3) 03/04/2015   ? TD 18+ HE  03/02/2019   ? INFLUENZA VACCINE RULE BASED (1) 08/01/2020   ? FALL RISK ASSESSMENT  08/15/2020   ? MEDICARE ANNUAL WELLNESS VISIT  08/15/2020   ? ADVANCE CARE PLANNING  08/15/2024   ? LIPID  12/09/2024   ? COLORECTAL CANCER SCREENING  09/23/2026   ? PNEUMOCOCCAL IMMUNIZATION 65+ LOW/MEDIUM RISK  Completed   ?  HEPATITIS B VACCINES  Aged Out

## 2021-06-19 NOTE — LETTER
Letter by Florentin Bill MD at      Author: Florentin Bill MD Service: -- Author Type: --    Filed:  Encounter Date: 8/16/2019 Status: (Other)         Chan Bishop  [address] [facility]  [place] [address]             August 16, 2019         Dear Mr. Bishop,    Below are the results from your recent visit:    Resulted Orders   HM2(CBC w/o Differential)   Result Value Ref Range    WBC 7.8 4.0 - 11.0 thou/uL    RBC 5.80 4.40 - 6.20 mill/uL    Hemoglobin 17.7 14.0 - 18.0 g/dL    Hematocrit 53.0 40.0 - 54.0 %    MCV 91 80 - 100 fL    MCH 30.5 27.0 - 34.0 pg    MCHC 33.4 32.0 - 36.0 g/dL    RDW 11.6 11.0 - 14.5 %    Platelets 218 140 - 440 thou/uL    MPV 8.3 7.0 - 10.0 fL   Comprehensive Metabolic Panel   Result Value Ref Range    Sodium 140 136 - 145 mmol/L    Potassium 4.6 3.5 - 5.0 mmol/L    Chloride 107 98 - 107 mmol/L    CO2 24 22 - 31 mmol/L    Anion Gap, Calculation 9 5 - 18 mmol/L    Glucose 92 70 - 125 mg/dL    BUN 17 8 - 22 mg/dL    Creatinine 1.12 0.70 - 1.30 mg/dL    GFR MDRD Af Amer >60 >60 mL/min/1.73m2    GFR MDRD Non Af Amer >60 >60 mL/min/1.73m2    Bilirubin, Total 0.8 0.0 - 1.0 mg/dL    Calcium 10.1 8.5 - 10.5 mg/dL    Protein, Total 7.2 6.0 - 8.0 g/dL    Albumin 4.4 3.5 - 5.0 g/dL    Alkaline Phosphatase 96 45 - 120 U/L    AST 26 0 - 40 U/L    ALT 26 0 - 45 U/L    Narrative    Fasting Glucose reference range is 70-99 mg/dL per  American Diabetes Association (ADA) guidelines.   Lipid Cascade   Result Value Ref Range    Cholesterol 165 <=199 mg/dL    Triglycerides 94 <=149 mg/dL    HDL Cholesterol 35 (L) >=40 mg/dL    LDL Calculated 111 <=129 mg/dL    Patient Fasting > 8hrs? Unknown    Thyroid Stimulating Hormone (TSH)   Result Value Ref Range    TSH 1.64 0.30 - 5.00 uIU/mL   Urinalysis-UC if Indicated   Result Value Ref Range    Color, UA Yellow Colorless, Yellow, Straw, Light Yellow    Clarity, UA Clear Clear    Glucose, UA Negative Negative    Bilirubin, UA Negative Negative    Ketones, UA  Negative Negative    Specific Gravity, UA 1.010 1.005 - 1.030    Blood, UA Negative Negative    pH, UA 7.0 5.0 - 8.0    Protein, UA Negative Negative mg/dL    Urobilinogen, UA 0.2 E.U./dL 0.2 E.U./dL, 1.0 E.U./dL    Nitrite, UA Negative Negative    Leukocytes, UA Negative Negative    Narrative    Microscopic not indicated  UC not indicated   PSA (Prostatic-Specific Antigen), Annual Screen   Result Value Ref Range    PSA <0.1 0.0 - 4.5 ng/mL    Narrative    Method is Abbott Prostate-Specific Antigen (PSA)  Standard-WHO 1st International (90:10)   Glycosylated Hemoglobin A1c   Result Value Ref Range    Hemoglobin A1c 5.5 3.5 - 6.0 %       All very good results    Please call with questions or contact us using [employer].    Sincerely,        Electronically signed by Florentin Bill MD

## 2021-06-19 NOTE — PROGRESS NOTES
Assessment and Plan:   Annual wellness visit    1. Routine general medical examination at a health care facility  Annual wellness visit  - HM2(CBC w/o Differential)  - Comprehensive Metabolic Panel  - Lipid Cascade  - Thyroid Stimulating Hormone (TSH)  - Urinalysis-UC if Indicated  - PSA (Prostatic-Specific Antigen), Annual Screen     The patient's current medical problems were reviewed.    I have had an Advance Directives discussion with the patient.  The following health maintenance schedule was reviewed with the patient and provided in printed form in the after visit summary:   Health Maintenance   Topic Date Due     ZOSTER VACCINE  2010     INFLUENZA VACCINE RULE BASED (1) 2018     TD 18+ HE  2019     FALL RISK ASSESSMENT  2019     ADVANCE DIRECTIVES DISCUSSED WITH PATIENT  2022     COLONOSCOPY  2026     PNEUMOCOCCAL POLYSACCHARIDE VACCINE AGE 65 AND OVER  Completed     PNEUMOCOCCAL CONJUGATE VACCINE FOR ADULTS (PCV13 OR PREVNAR)  Completed        Subjective:   Chief Complaint: Chan Bishop is an 68 y.o. male here for an Annual Wellness visit.   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    Colonoscopy normal 2016.    Sulfa allergy.    Non-smoker no excess alcohol.    Immunizations reviewed and up-to-date.    History of left total hip arthroplasty and fractured ankle with no surgery plus hypertension.    Prostate cancer status post open prostatectomy without evidence of recurrence and now deemed to be cured by Dr. EUBANKS of [facility] urology.    Mother  ovarian cancer age 45 one sister  of a brain cancer age 40.  Father  age 83 of Alzheimer's after a fall.  2 children well 2 grandchildren well.  Wife with history of coronary vasospasm registered nurse now retired.    Enjoys travel has been to [place].    Review of Systems:    Please see above.  The rest of the review of systems are  "negative for all systems.    Patient Care Team:  Florentin Bill MD as PCP - General     Patient Active Problem List   Diagnosis     Reactions To Sulfa Drugs     Adenocarcinoma Of The Prostate Gland     Essential Hypertension     No past medical history on file.   No past surgical history on file.   No family history on file.   Social History     Social History     Marital status:      Spouse name: N/A     Number of children: N/A     Years of education: N/A     Occupational History     Not on file.     Social History Main Topics     Smoking status: Never Smoker     Smokeless tobacco: Never Used     Alcohol use 0.6 oz/week     1 Standard drinks or equivalent per week     Drug use: No     Sexual activity: Not on file     Other Topics Concern     Not on file     Social History Narrative      Current Outpatient Prescriptions   Medication Sig Dispense Refill     amLODIPine-benazepril (LOTREL 5-20) 5-20 mg per capsule TAKE ONE CAPSULE BY MOUTH ONE TIME DAILY 90 capsule 3     acyclovir (ZOVIRAX) 5 % ointment Thin layer tid prn 15 g 0     No current facility-administered medications for this visit.       Objective:   Vital Signs:   Visit Vitals     /82 (Patient Site: Right Arm, Patient Position: Sitting)     Pulse 66     Ht 5' 10\" (1.778 m)     Wt 202 lb (91.6 kg)     SpO2 97%     BMI 28.98 kg/m2        VisionScreening:  No exam data present     PHYSICAL EXAM  Chest clear to auscultation and percussion.  Heart tones regular rhythm without murmur rub or gallop.  Abdomen soft nontender no organomegaly.  No peritoneal signs.  Extremities free of edema cyanosis or clubbing.  Neck veins nondistended no thyromegaly or scleral icterus noted, carotids full.  Skin warm and dry easily conversant good spirited.  Normal intelligence.  Neurologically intact no gross localizing findings.  Rest of examination negative in its entirety including negative skin lymph neuropsych HEENT back straight no severe spine tenderness " general rectal exam negative wears glasses easily conversing good spirited highly intelligent good pulses noted all 4 extremities no carotid bruits.    Assessment Results 8/3/2018   Activities of Daily Living No help needed   Instrumental Activities of Daily Living No help needed   Get Up and Go Score Less than 12 seconds   Mini Cog Total Score 5   Some recent data might be hidden     A Mini-Cog score of 0-2 suggests the possibility of dementia, score of 3-5 suggests no dementia    Identified Health Risks:     The patient was counseled and encouraged to consider modifying their diet and eating habits. He was provided with information on recommended healthy diet options.  Patient's advanced directive was discussed and I am comfortable with the patient's wishes.

## 2021-06-20 ENCOUNTER — HEALTH MAINTENANCE LETTER (OUTPATIENT)
Age: 71
End: 2021-06-20

## 2021-06-20 NOTE — LETTER
Letter by Florentin Bill MD at      Author: Florentin Bill MD Service: -- Author Type: --    Filed:  Encounter Date: 8/18/2020 Status: (Other)         Chan Bishop  [address] [facility]  [place] [address]             August 18, 2020         Dear Mr. Bishop,    Below are the results from your recent visit:    Resulted Orders   HM2(CBC w/o Differential)   Result Value Ref Range    WBC 8.8 4.0 - 11.0 thou/uL    RBC 5.47 4.40 - 6.20 mill/uL    Hemoglobin 16.5 14.0 - 18.0 g/dL    Hematocrit 50.5 40.0 - 54.0 %    MCV 92 80 - 100 fL    MCH 30.1 27.0 - 34.0 pg    MCHC 32.6 32.0 - 36.0 g/dL    RDW 12.0 11.0 - 14.5 %    Platelets 241 140 - 440 thou/uL    MPV 7.9 7.0 - 10.0 fL   Comprehensive Metabolic Panel   Result Value Ref Range    Sodium 138 136 - 145 mmol/L    Potassium 4.5 3.5 - 5.0 mmol/L    Chloride 106 98 - 107 mmol/L    CO2 24 22 - 31 mmol/L    Anion Gap, Calculation 8 5 - 18 mmol/L    Glucose 91 70 - 125 mg/dL    BUN 25 8 - 28 mg/dL    Creatinine 1.07 0.70 - 1.30 mg/dL    GFR MDRD Af Amer >60 >60 mL/min/1.73m2    GFR MDRD Non Af Amer >60 >60 mL/min/1.73m2    Bilirubin, Total 0.7 0.0 - 1.0 mg/dL    Calcium 10.0 8.5 - 10.5 mg/dL    Protein, Total 7.0 6.0 - 8.0 g/dL    Albumin 4.1 3.5 - 5.0 g/dL    Alkaline Phosphatase 97 45 - 120 U/L    AST 24 0 - 40 U/L    ALT 33 0 - 45 U/L    Narrative    Fasting Glucose reference range is 70-99 mg/dL per  American Diabetes Association (ADA) guidelines.   Lipid Cascade   Result Value Ref Range    Cholesterol 140 <=199 mg/dL    Triglycerides 103 <=149 mg/dL    HDL Cholesterol 33 (L) >=40 mg/dL    LDL Calculated 86 <=129 mg/dL    Patient Fasting > 8hrs? Yes    Urinalysis-UC if Indicated   Result Value Ref Range    Color, UA Yellow Colorless, Yellow, Straw, Light Yellow    Clarity, UA Clear Clear    Glucose, UA Negative Negative    Bilirubin, UA Negative Negative    Ketones, UA Negative Negative    Specific Gravity, UA >=1.030 1.005 - 1.030    Blood, UA Negative Negative     pH, UA 6.0 5.0 - 8.0    Protein, UA Negative Negative mg/dL    Urobilinogen, UA 0.2 E.U./dL 0.2 E.U./dL, 1.0 E.U./dL    Nitrite, UA Negative Negative    Leukocytes, UA Negative Negative    Bacteria, UA Few (!) None Seen hpf    RBC, UA 0-2 None Seen, 0-2 hpf    WBC, UA None Seen None Seen, 0-5 hpf    Squam Epithel, UA 0-5 None Seen, 0-5 lpf    Narrative    UC not indicated       All very good results     Please call with questions or contact us using [employer].    Sincerely,        Electronically signed by Florentin Bill MD

## 2021-06-20 NOTE — LETTER
Letter by Florentin Bill MD at      Author: Florentin Bill MD Service: -- Author Type: --    Filed:  Encounter Date: 8/21/2020 Status: (Other)         Chan Bishop  [address]  [place] [address]             August 21, 2020         Dear Mr. Bishop,    Below are the results from your recent visit:    Resulted Orders   HM2(CBC w/o Differential)   Result Value Ref Range    WBC 8.8 4.0 - 11.0 thou/uL    RBC 5.47 4.40 - 6.20 mill/uL    Hemoglobin 16.5 14.0 - 18.0 g/dL    Hematocrit 50.5 40.0 - 54.0 %    MCV 92 80 - 100 fL    MCH 30.1 27.0 - 34.0 pg    MCHC 32.6 32.0 - 36.0 g/dL    RDW 12.0 11.0 - 14.5 %    Platelets 241 140 - 440 thou/uL    MPV 7.9 7.0 - 10.0 fL   Comprehensive Metabolic Panel   Result Value Ref Range    Sodium 138 136 - 145 mmol/L    Potassium 4.5 3.5 - 5.0 mmol/L    Chloride 106 98 - 107 mmol/L    CO2 24 22 - 31 mmol/L    Anion Gap, Calculation 8 5 - 18 mmol/L    Glucose 91 70 - 125 mg/dL    BUN 25 8 - 28 mg/dL    Creatinine 1.07 0.70 - 1.30 mg/dL    GFR MDRD Af Amer >60 >60 mL/min/1.73m2    GFR MDRD Non Af Amer >60 >60 mL/min/1.73m2    Bilirubin, Total 0.7 0.0 - 1.0 mg/dL    Calcium 10.0 8.5 - 10.5 mg/dL    Protein, Total 7.0 6.0 - 8.0 g/dL    Albumin 4.1 3.5 - 5.0 g/dL    Alkaline Phosphatase 97 45 - 120 U/L    AST 24 0 - 40 U/L    ALT 33 0 - 45 U/L    Narrative    Fasting Glucose reference range is 70-99 mg/dL per  American Diabetes Association (ADA) guidelines.   Lipid Cascade   Result Value Ref Range    Cholesterol 140 <=199 mg/dL    Triglycerides 103 <=149 mg/dL    HDL Cholesterol 33 (L) >=40 mg/dL    LDL Calculated 86 <=129 mg/dL    Patient Fasting > 8hrs? Yes    Thyroid Stimulating Hormone (TSH)   Result Value Ref Range    TSH 1.62 0.30 - 5.00 uIU/mL   Urinalysis-UC if Indicated   Result Value Ref Range    Color, UA Yellow Colorless, Yellow, Straw, Light Yellow    Clarity, UA Clear Clear    Glucose, UA Negative Negative    Bilirubin, UA Negative Negative    Ketones, UA  Negative Negative    Specific Gravity, UA >=1.030 1.005 - 1.030    Blood, UA Negative Negative    pH, UA 6.0 5.0 - 8.0    Protein, UA Negative Negative mg/dL    Urobilinogen, UA 0.2 E.U./dL 0.2 E.U./dL, 1.0 E.U./dL    Nitrite, UA Negative Negative    Leukocytes, UA Negative Negative    Bacteria, UA Few (!) None Seen hpf    RBC, UA 0-2 None Seen, 0-2 hpf    WBC, UA None Seen None Seen, 0-5 hpf    Squam Epithel, UA 0-5 None Seen, 0-5 lpf    Narrative    UC not indicated   PSA (Prostatic-Specific Antigen), Annual Screen   Result Value Ref Range    PSA <0.1 0.0 - 6.5 ng/mL    Narrative    Method is Abbott Prostate-Specific Antigen (PSA)  Standard-WHO 1st International (90:10)   COVID-19 Virus (Coronavirus) Antibody & Titer Reflex   Result Value Ref Range    COVID-19 Antibody Screen Negative       Comment:      No COVID-19 antibodies detected.  Patients within 10 days of symptom onset for  COVID-19 may not produce sufficient levels of detectable antibodies.  Immunocompromised COVID-19 patients may take longer to develop antibodies.    COVID-19 IgG Titer Not Applicable       Comment:      Qualitative screen for total antibodies to COVID-19 (SARS-CoV-2) with  semi-quantitative measurement of IgG COVID-19 antibodies by endpoint titer.  COVID-19 antibodies may be elevated due to a past or current infection.  Negative results do not rule out COVID-19 infection.  Results from antibody  testing should not be used as the sole basis to diagnose or exclude SARS-CoV-2  infection or to inform infection status.  COVID-19 PCR test should be ordered  if current infection is suspected.  False positive results may occur in rare  cases due to cross-reacting antibodies.  This test was developed and its performance characteristics determined by the  [facility] ([facility]),  which is regulated under CLIA as qualified to perform high-complexity testing.  This test has not been reviewed by  the FDA.  Testing performed by [facility], [facility], [address], [address], [place]   [address]       All very good results     Please call with questions or contact us using [employer].    Sincerely,        Electronically signed by Florentin Bill MD

## 2021-06-20 NOTE — LETTER
Letter by Florentin Bill MD at      Author: Florentin Bill MD Service: -- Author Type: --    Filed:  Encounter Date: 12/10/2019 Status: Signed         Chan Bishop  [address] [facility] [address]             December 10, 2019         Dear Mr. Bishop,    Below are the results from your recent visit:    Resulted Orders   Lipid Cascade   Result Value Ref Range    Cholesterol 129 <=199 mg/dL    Triglycerides 173 (H) <=149 mg/dL    HDL Cholesterol 33 (L) >=40 mg/dL    LDL Calculated 61 <=129 mg/dL    Patient Fasting > 8hrs? Yes        All very good results    Please call with questions or contact us using [employer].    Sincerely,        Electronically signed by Florentin Bill MD

## 2021-06-22 NOTE — PROGRESS NOTES
Office Visit - Follow up    Chan Bishop   68 y.o. male    Date of Visit: 1/8/2019    Chief Complaint   Patient presents with     Hypertension       Subjective: Hypertension.    Better control no headaches.    Metoprolol added November 28, 2018.    Denies chest pain shortness of breath no blood in stool or urine    Medication list reviewed well-tolerated normal effects.    Colonoscopy dated September 23, 2016 all normal.  Medication list reviewed well-tolerated history of sulfa allergy.    Erectile dysfunction after robotic prostatectomy for prostate cancer no clinical evidence of recurrence.  Denies bone pain.    ROS: A comprehensive review of systems was performed and was otherwise negative    Medications:  Prior to Admission medications    Medication Sig Start Date End Date Taking? Authorizing Provider   amLODIPine-benazepril (LOTREL 5-20) 5-20 mg per capsule TAKE ONE CAPSULE BY MOUTH ONE TIME DAILY 2/11/18  Yes Florentin Bill MD   metoprolol succinate (TOPROL-XL) 50 MG 24 hr tablet Take 1 tablet (50 mg total) by mouth daily. 11/28/18  Yes Florentin Bill MD   acyclovir (ZOVIRAX) 5 % ointment Thin layer tid prn 11/29/17   Florentin Bill MD       Allergies:   Allergies   Allergen Reactions     Sulfa (Sulfonamide Antibiotics) Rash       Immunizations:   Immunization History   Administered Date(s) Administered     Hep A, historic 01/10/2008     Hep B, Adult 08/11/2015, 09/11/2015, 02/23/2016     Influenza high dose, seasonal 09/11/2015, 10/14/2016, 09/30/2017     Influenza, nasal, historic 09/17/2018     Japanese Encephalitis, IM 08/16/2010, 09/09/2010, 09/05/2013     Pneumo Conj 13-V (2010&after) 07/20/2015     Pneumo Polysac 23-V 07/21/2016     Td, Adult, Absorbed 06/02/2004     Td,adult,historic,unspecified 06/02/2004, 03/02/2009     Tdap 03/02/2009     Typhoid Live, Oral 01/10/2008, 09/05/2013     ZOSTER, LIVE 01/07/2015       Exam Chest clear to auscultation and percussion.  Heart tones  regular rhythm without murmur rub or gallop.  Abdomen soft nontender no organomegaly.  No peritoneal signs.  Extremities free of edema cyanosis or clubbing.  Neck veins nondistended no thyromegaly or scleral icterus noted, carotids full.  Skin warm and dry easily conversant good spirited.  Normal intelligence.  Neurologically intact no gross localizing findings.  122/74 pulse 64 respirations 18 O2 sats 99% BMI elevated 29+.    Assessment and Plan  Hypertension controlled.  122/74.    Erectile dysfunction after prostatectomy.  Phosphodiesterase inhibitors not a concern for the patient.    Prostate cancer no sign of recurrence.    Sulfa allergy.    Overweight discussed borderline obesity.  Weight down 3 pounds.  See below.    Time: total time spent with the patient was 25 minutes of which >50% was spent in counseling and coordination of care    The following high BMI interventions were performed this visit: encouragement to exercise    Florentin Bill MD    Patient Active Problem List   Diagnosis     Reactions To Sulfa Drugs     Adenocarcinoma Of The Prostate Gland     Essential Hypertension

## 2021-06-22 NOTE — PROGRESS NOTES
Office Visit - Follow up    Chan Bishop   68 y.o. male    Date of Visit: 11/28/2018    Chief Complaint   Patient presents with     Hypertension       Subjective: Hypertension.  Outside blood pressure readings high 130-144 systolic with diastolics of .  Today initial reading 126/80 recheck 142/96.  With this examiner the latter x2.  He denies headache or chest pain or dizziness no shortness of breath.    No blood in stool or urine medication list reviewed well-tolerated.  Allergies include sulfa.    Colonoscopy dated September 23 of 16 allCLEAR normal.  History of prostate cancer status post robotic prostatectomy no clinical evidence of recurrence denies bone pain no weight loss.  Weight up 6 pounds from previous.  Exercises regularly and avoid salt in the diet walks twice a day.    ROS: A comprehensive review of systems was performed and was otherwise negative    Medications:  Prior to Admission medications    Medication Sig Start Date End Date Taking? Authorizing Provider   acyclovir (ZOVIRAX) 5 % ointment Thin layer tid prn 11/29/17  Yes Florentin Bill MD   amLODIPine-benazepril (LOTREL 5-20) 5-20 mg per capsule TAKE ONE CAPSULE BY MOUTH ONE TIME DAILY 2/11/18  Yes Florentin Bill MD   metoprolol succinate (TOPROL-XL) 50 MG 24 hr tablet Take 1 tablet (50 mg total) by mouth daily. 11/28/18   Florentin Bill MD   hydroCHLOROthiazide (HYDRODIURIL) 12.5 MG tablet Take 1 tablet (12.5 mg total) by mouth daily. 10/25/18 11/28/18  Florentin Bill MD   lisinopril (PRINIVIL,ZESTRIL) 20 MG tablet Take 1 tablet (20 mg total) by mouth daily. 10/15/18 11/28/18  Florentin Bill MD       Allergies:   Allergies   Allergen Reactions     Sulfa (Sulfonamide Antibiotics) Rash       Immunizations:   Immunization History   Administered Date(s) Administered     DT (pediatric) 06/02/2004     Hep A, historic 01/10/2008     Pneumo Conj 13-V (2010&after) 07/20/2015     Pneumo Polysac 23-V 07/21/2016      Td,adult,historic,unspecified 06/02/2004, 03/02/2009     Tdap 03/02/2009       Exam Chest clear to auscultation and percussion.  Heart tones regular rhythm without murmur rub or gallop.  Abdomen soft nontender no organomegaly.  No peritoneal signs.  Extremities free of edema cyanosis or clubbing.  Neck veins nondistended no thyromegaly or scleral icterus noted, carotids full.  Skin warm and dry easily conversant good spirited.  Normal intelligence.  Neurologically intact no gross localizing findings.  Recheck blood pressure 142/96 right arm sitting x2.  Pulse 77 respiratory rate 18 O2 sats 98% on room air.    Assessment and Plan  Hypertension not optimally controlled add metoprolol sustained release 50 mg once daily.    Continue amlodipine benazepril 5/20 once daily.    Obesity discussed.    Sulfa allergy.    Prostate cancer by history status post robotic prostatectomy thankfully no sign of recurrence.  RTC 1 month.  Advised daily exercise salt restriction.    Time: total time spent with the patient was 25 minutes of which >50% was spent in counseling and coordination of care    The following high BMI interventions were performed this visit: encouragement to exercise    Florentin Bill MD    Patient Active Problem List   Diagnosis     Reactions To Sulfa Drugs     Adenocarcinoma Of The Prostate Gland     Essential Hypertension

## 2021-06-28 NOTE — PROGRESS NOTES
Progress Notes by Aneudy Dumont MD at 12/18/2019  1:30 PM     Author: Aneudy Dumont MD Service: -- Author Type: Physician    Filed: 12/18/2019  2:20 PM Encounter Date: 12/18/2019 Status: Signed    : Aneudy Dumont MD (Physician)         Cardiology Clinic Office Note    Assessment / Plan:    1.  Probable viral myocarditis, with chest pain, elevated troponins, and mild cardiomyopathy.  All symptoms have now resolved.  Will recheck echocardiogram to see if full recovery of ventricular function has occurred.  If left ventricular function is stable or improved, could gradually resume full activities without limitations.  No significant arrhythmias have been demonstrated.  2.  Hypertension, with mild left ventricular hypertrophy and somewhat dilated coronary arteries.  Likely reflects long history of heavy physical exertion.  This may not be pathological.  This was discussed with patient and his wife.    Follow-up 1 year, unless significant changes in echo are present    ______________________________________________________________________    Subjective:    I had the opportunity to see Chan Bishop at the [facility]. Chan Bishop is a 69 y.o. male with a history of hypertension and paroxysmal tachycardia with vigorous exercise regimen who presented earlier this month with somewhat atypical chest pains, found to have elevated troponins and a mild decrease in left ventricular ejection fraction to 48%.  Subsequent coronary angiography did not reveal a significant stenosis, and cardiac MRI revealed patchy gadolinium uptake suggestive of focal myocarditis.  He returns today for routine follow-up, accompanied by his wife.    Since his hospital discharge, he has had no recurrent chest pains.  He has refrained from resuming his vigorous exercise regimen, which is difficult for him.  He has had no palpitations, syncope or near syncopal spells.  He has had no  peripheral edema.  He recalls paroxysmal atrial tachycardia as a teenager requiring hospitalization at [facility].  This has not recurred since that time.  He is always been quite athletic, without difficulty.  He and his wife still hope to hike in [facility] in the spring.  ______________________________________________________________________    Problem List:  Patient Active Problem List   Diagnosis   ? Reactions To Sulfa Drugs   ? Adenocarcinoma Of The Prostate Gland   ? Essential hypertension   ? Elevated troponin   ? ACS (acute coronary syndrome) (H)   ? Pleuritic chest pain   ? Cardiomyopathy, unspecified type (H)     Medical History:  Past Medical History:   Diagnosis Date   ? HTN (hypertension)    ? Hyperlipidemia    ? Prostate CA (H)      Surgical History:  Past Surgical History:   Procedure Laterality Date   ? CV CORONARY ANGIOGRAM N/A 12/3/2019    Procedure: Coronary Angiogram;  Surgeon: Sheryl Mccormick MD;  Location: [facility];  Service: Cardiology   ? CV LEFT HEART CATHETERIZATION WO LEFT VETRICULOGRAM Left 12/3/2019    Procedure: Left Heart Catheterization Without Left Ventriculogram;  Surgeon: Sheryl Mccormick MD;  Location: [facility];  Service: Cardiology   ? HIP SURGERY     ? PROSTATECTOMY  1996     Social History:  Social History     Socioeconomic History   ? Marital status:      Spouse name: Ledy   ? Number of children: 2   ? Years of education: Not on file   ? Highest education level: Not on file   Occupational History   ? 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 Financial resource strain: Not on file   ? Food insecurity:     Worry: Not on file     Inability: Not on file   ? Transportation needs:     Medical: Not on file     Non-medical: Not on file   Tobacco Use   ? Smoking status: Never Smoker   ? Smokeless tobacco: Never Used   Substance and Sexual Activity   ? Alcohol use: Yes     Comment: rare   ? Drug use: No   ? Sexual activity: Not on file    Lifestyle   ? Physical activity:     Days per week: Not on file     Minutes per session: Not on file   ? Stress: Not on file   Relationships   ? Social connections:     Talks on phone: Not on file     Gets together: Not on file     Attends Jainism service: Not on file     Active member of club or organization: Not on file     Attends meetings of clubs or organizations: Not on file     Relationship status: Not on file   ? Intimate partner violence:     Fear of current or ex partner: Not on file     Emotionally abused: Not on file     Physically abused: Not on file     Forced sexual activity: Not on file   Other Topics Concern   ? Not on file   Social History Narrative     40 yrs, Ledy,  2 kids; commercial printing    Non smoker/occ  drinker     Sleep History:  Restorative.  Occasionally snores.  Exercise History:  Minimal since hospitalization, as directed    Review of Systems:   General: WNL  Eyes: WNL  Ears/Nose/Throat: WNL  Lungs: WNL  Heart: WNL  Stomach: Constipation  Bladder: WNL  Muscle/Joints: Muscle Pain  Skin: WNL  Nervous System: WNL  Mental Health: WNL     Blood: WNL          Family History:  Family History   Problem Relation Age of Onset   ? Ovarian cancer Mother          Allergies:  Allergies   Allergen Reactions   ? Sulfa (Sulfonamide Antibiotics) Rash     Medications:  Current Outpatient Medications   Medication Sig Dispense Refill   ? amLODIPine (NORVASC) 5 MG tablet Take 5 mg by mouth daily.  3   ? aspirin 81 mg chewable tablet Chew 1 tablet (81 mg total) daily.  0   ? atorvastatin (LIPITOR) 10 MG tablet Take 1 tablet (10 mg total) by mouth at bedtime. 30 tablet 2   ? lisinopril (PRINIVIL,ZESTRIL) 20 MG tablet Take 20 mg by mouth daily.  2   ? metoprolol succinate (TOPROL-XL) 50 MG 24 hr tablet Take 1 tablet (50 mg total) by mouth daily. 90 tablet 2   ? nitroglycerin (NITROSTAT) 0.4 MG SL tablet Place 1 tablet (0.4 mg total) under the tongue every 5 (five) minutes as needed for chest  "pain. 30 tablet 0     No current facility-administered medications for this visit.        Objective:   Wt Readings from Last 3 Encounters:   12/18/19 207 lb (93.9 kg)   12/09/19 203 lb (92.1 kg)   12/04/19 197 lb 11.2 oz (89.7 kg)     Vital signs:  /80 (Patient Site: Left Arm, Patient Position: Sitting, Cuff Size: Adult Large)   Pulse (!) 56   Resp 16   Ht 5' 11\" (1.803 m)   Wt 207 lb (93.9 kg)   BMI 28.87 kg/m        Physical Exam:    General Appearance : Awake, Alert, No acute distress  HEENT: No Scleral icterus; the mucous membranes were pink and moist.  Conjunctivae not injected  Neck:  No cervical bruits, jugular venous distention, or thyromegaly   Chest: The spine was straight. Chest wall symmetric  Lungs: Respirations unlabored; the lungs are clear to auscultation.  No wheezing   Cardiovascular:   Normal point of maximal impulse.  Auscultation reveals normal first and second heart sounds with no murmurs, rubs, or gallops.  Carotid, radial, and dorsalis pedal pulses are intact and symmetric.  Abdomen: No organomegaly, masses, bruits, or tenderness. Bowels sounds are present  Extremities:  No clubbing, cyanosis.  No edema  Skin: No rash, bruising  Musculoskeletal: No tenderness.  Neurologic: Alert and oriented ×3. Speech is fluent.    Lab Results:  LIPIDS:  Lab Results   Component Value Date    CHOL 129 12/09/2019    CHOL 165 08/15/2019    CHOL 175 08/03/2018     Lab Results   Component Value Date    HDL 33 (L) 12/09/2019    HDL 35 (L) 08/15/2019    HDL 39 (L) 08/03/2018     Lab Results   Component Value Date    LDLCALC 61 12/09/2019    LDLCALC 111 08/15/2019    LDLCALC 107 08/03/2018     Lab Results   Component Value Date    TRIG 173 (H) 12/09/2019    TRIG 94 08/15/2019    TRIG 147 08/03/2018       Cardiac MRI 12/3/2019:  1. Normal left ventricular size, wall thickness and preserved global systolic function. There is severe hypokinesis in basal inferolateral and basal lateral segments. The " quantified left ventricular ejection fraction is 56%.   2. Late delayed gadolinium enhancement demonstrated that there is myocardial scar in mid to epicardial layer of basal inferolateral and basal lateral segments. There are patchy gadolinium enhancement in basal septal segment. The findings are supportive the diagnosis of viral myocarditis.  3. Normal right ventricular size function.   4. No obvious valvular disease.    Coronary angiogram 12/3/2019:    Chest pain. EF 48% by echocardiogram with lateral wall motion abnormality.    Mild-moderate ostial left main disease followed by an ectatic mid and distal left main. FFR into the LAD was not flow limiting across the left main ostial lesion.    Diffuse mild coronary atherosclerosis otherwise.    LV EDP normal        NEW ROMERO MD [facility]  [phone]    This note created using Dragon voice recognition software.  Sound alike errors may have escaped editing.

## 2021-06-30 NOTE — PROGRESS NOTES
Progress Notes by Aneudy Dumont MD at 12/4/2020 11:30 AM     Author: Aneudy Dumont MD Service: -- Author Type: Physician    Filed: 12/4/2020 12:03 PM Encounter Date: 12/4/2020 Status: Signed    : Aneudy Dumont MD (Physician)         Cardiology Clinic Office Note    Assessment / Plan:    1.  Presumed viral myocarditis, with chest pain, elevated troponins, and mild cardiomyopathy.  Full resolution with normalization of left ventricular systolic function.  No limitations  2.  Hypertension, with mild left ventricular hypertrophy and somewhat dilated coronary arteries.  Suggest continued aspirin use long-term.    Follow-up as needed    ______________________________________________________________________    Subjective:    I had the opportunity to see Chan Bishop at the [facility]. Chan Bishop is a 70 y.o. male with a history of hypertension and paroxysmal tachycardia with vigorous exercise regimen who 12/2019 with somewhat atypical chest pains, found to have elevated troponins and a mild decrease in left ventricular ejection fraction to 48%.  Subsequent coronary angiography did not reveal a significant stenosis, and cardiac MRI revealed patchy gadolinium uptake suggestive of focal myocarditis.  Follow-up echocardiogram showed normalization of left ventricular systolic function.  He returns today for routine follow-up.    Over the last year, he has felt well, and resume full activities without limitations.  He is walking for miles a day and feels well.  He has had no palpitations, syncope or near syncopal spells.  He still carries nitroglycerin but has not had any occasion to use it.  He was diagnosed with osteoarthritis, feels improvement in his neck stiffness and pain with use of diclofenac.  Tylenol was ineffective.  We did discuss the slight increase in risk of myocardial infarction with chronic use of this  agent.      ______________________________________________________________________    Problem List:  Patient Active Problem List   Diagnosis   ? Reactions To Sulfa Drugs   ? Adenocarcinoma Of The Prostate Gland   ? Essential hypertension   ? Elevated troponin   ? ACS (acute coronary syndrome) (H)   ? Pleuritic chest pain   ? Cardiomyopathy, unspecified type (H)     Medical History:  Past Medical History:   Diagnosis Date   ? HTN (hypertension)    ? Hyperlipidemia    ? Prostate CA (H)      Surgical History:  Past Surgical History:   Procedure Laterality Date   ? CV CORONARY ANGIOGRAM N/A 12/3/2019    Procedure: Coronary Angiogram;  Surgeon: Sheryl Mccormick MD;  Location: [facility];  Service: Cardiology   ? CV LEFT HEART CATHETERIZATION WO LEFT VETRICULOGRAM Left 12/3/2019    Procedure: Left Heart Catheterization Without Left Ventriculogram;  Surgeon: Sheryl Mccormick MD;  Location: [facility] Lab;  Service: Cardiology   ? HIP SURGERY     ? PROSTATECTOMY  1996     Social History:  Social History     Socioeconomic History   ? Marital status:      Spouse name: Ledy   ? Number of children: 2   ? Years of education: Not on file   ? Highest education level: Not on file   Occupational History   ? 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 Financial resource strain: Not on file   ? Food insecurity     Worry: Not on file     Inability: Not on file   ? Transportation needs     Medical: Not on file     Non-medical: Not on file   Tobacco Use   ? Smoking status: Never Smoker   ? Smokeless tobacco: Never Used   ? Tobacco comment: /70 pulse 76 yesterday per wife   Substance and Sexual Activity   ? Alcohol use: Yes     Comment: rare   ? Drug use: No   ? Sexual activity: Not on file   Lifestyle   ? Physical activity     Days per week: Not on file     Minutes per session: Not on file   ? Stress: Not on file   Relationships   ? Social connections     Talks on phone: Not on file     Gets  together: Not on file     Attends Mormonism service: Not on file     Active member of club or organization: Not on file     Attends meetings of clubs or organizations: Not on file     Relationship status: Not on file   ? Intimate partner violence     Fear of current or ex partner: Not on file     Emotionally abused: Not on file     Physically abused: Not on file     Forced sexual activity: Not on file   Other Topics Concern   ? Not on file   Social History Narrative     40 yrs, Ledy,  2 kids; commercial printing    Non smoker/occ  drinker     Sleep History:  Restorative.    Exercise History:  Walking miles daily    Review of Systems:   General: WNL  Eyes: WNL  Ears/Nose/Throat: WNL  Lungs: WNL  Heart: WNL  Stomach: WNL  Bladder: WNL  Muscle/Joints: Joint Pain  Skin: WNL  Nervous System: WNL  Mental Health: WNL     Blood: WNL          Family History:  Family History   Problem Relation Age of Onset   ? Ovarian cancer Mother          Allergies:  Allergies   Allergen Reactions   ? Sulfa (Sulfonamide Antibiotics) Rash     Medications:  Current Outpatient Medications   Medication Sig Dispense Refill   ? amLODIPine (NORVASC) 5 MG tablet Take 1 tablet (5 mg total) by mouth daily. 90 tablet 3   ? aspirin 81 mg chewable tablet Chew 1 tablet (81 mg total) daily.  0   ? diclofenac (VOLTAREN) 75 MG EC tablet Take 75 mg by mouth 2 (two) times a day.     ? lisinopriL (PRINIVIL,ZESTRIL) 20 MG tablet Take 1 tablet (20 mg total) by mouth daily. 90 tablet 3   ? lovastatin (MEVACOR) 10 MG tablet Take 1 tablet (10 mg total) by mouth at bedtime. 90 tablet 3   ? metoprolol succinate (TOPROL-XL) 50 MG 24 hr tablet Take 1 tablet (50 mg total) by mouth daily. 90 tablet 2   ? nitroglycerin (NITROSTAT) 0.4 MG SL tablet Place 1 tablet (0.4 mg total) under the tongue every 5 (five) minutes as needed for chest pain. 30 tablet 0   ? magnesium 30 mg tablet Take 30 mg by mouth 2 (two) times a day.       No current facility-administered  "medications for this visit.        Objective:   Wt Readings from Last 3 Encounters:   12/04/20 202 lb (91.6 kg)   08/17/20 197 lb (89.4 kg)   03/17/20 203 lb (92.1 kg)     Vital signs:  /84 (Patient Site: Right Arm, Patient Position: Sitting, Cuff Size: Adult Regular)   Pulse 64   Resp 16   Ht 5' 10.5\" (1.791 m)   Wt 202 lb (91.6 kg) Comment: With shoes.  SpO2 98%   BMI 28.57 kg/m        Physical Exam:    General Appearance : Awake, Alert, No acute distress  HEENT: No Scleral icterus; the mucous membranes were pink and moist.  Conjunctivae not injected  Neck:  No cervical bruits, jugular venous distention, or thyromegaly   Chest: The spine was straight. Chest wall symmetric  Lungs: Respirations unlabored; the lungs are clear to auscultation.  No wheezing   Cardiovascular:   Normal point of maximal impulse.  Auscultation reveals normal first and second heart sounds with no murmurs, rubs, or gallops.  Carotid, radial, and dorsalis pedal pulses are intact and symmetric.  Abdomen: No organomegaly, masses, bruits, or tenderness. Bowels sounds are present  Extremities:  No clubbing, cyanosis.  No edema  Skin: No rash, bruising  Musculoskeletal: No tenderness.  Neurologic: Alert and oriented ×3. Speech is fluent.    Lab Results:  LIPIDS:  Lab Results   Component Value Date    CHOL 140 08/17/2020    CHOL 129 12/09/2019    CHOL 165 08/15/2019     Lab Results   Component Value Date    HDL 33 (L) 08/17/2020    HDL 33 (L) 12/09/2019    HDL 35 (L) 08/15/2019     Lab Results   Component Value Date    LDLCALC 86 08/17/2020    LDLCALC 61 12/09/2019    LDLCALC 111 08/15/2019     Lab Results   Component Value Date    TRIG 103 08/17/2020    TRIG 173 (H) 12/09/2019    TRIG 94 08/15/2019       Echocardiogram 12/2019:    Normal left ventricular size.The calculated left ventricular ejection fraction is 55%. This represents a normal ejection fraction. Mild concentric hypertrophy noted.    The following segments are hypokinetic: " basal inferolateral and basal anterolateral.    Normal right ventricular size and systolic function.    When compared to the previous study dated 12/3/2019, there is interval increase LVEF.      Cardiac MRI 12/3/2019:  1. Normal left ventricular size, wall thickness and preserved global systolic function. There is severe hypokinesis in basal inferolateral and basal lateral segments. The quantified left ventricular ejection fraction is 56%.   2. Late delayed gadolinium enhancement demonstrated that there is myocardial scar in mid to epicardial layer of basal inferolateral and basal lateral segments. There are patchy gadolinium enhancement in basal septal segment. The findings are supportive the diagnosis of viral myocarditis.  3. Normal right ventricular size function.   4. No obvious valvular disease.    Coronary angiogram 12/3/2019:    Chest pain. EF 48% by echocardiogram with lateral wall motion abnormality.    Mild-moderate ostial left main disease followed by an ectatic mid and distal left main. FFR into the LAD was not flow limiting across the left main ostial lesion.    Diffuse mild coronary atherosclerosis otherwise.    LV EDP normal        NEW ROMERO MD [facility]  [phone]    This note created using Dragon voice recognition software.  Sound alike errors may have escaped editing.

## 2021-07-01 ENCOUNTER — RECORDS - HEALTHEAST (OUTPATIENT)
Dept: ADMINISTRATIVE | Facility: OTHER | Age: 71
End: 2021-07-01

## 2021-07-03 NOTE — ADDENDUM NOTE
Addendum Note by Aneudy Dumont MD at 12/4/2020 11:30 AM     Author: Aneudy Dumont MD Service: -- Author Type: Physician    Filed: 12/4/2020 12:03 PM Encounter Date: 12/4/2020 Status: Signed    : Aneudy Dumont MD (Physician)    Addended by: ANEUDY DUMONT on: 12/4/2020 12:03 PM        Modules accepted: Orders

## 2021-07-13 ENCOUNTER — RECORDS - HEALTHEAST (OUTPATIENT)
Dept: ADMINISTRATIVE | Facility: CLINIC | Age: 71
End: 2021-07-13

## 2021-08-10 ENCOUNTER — TRANSFERRED RECORDS (OUTPATIENT)
Dept: HEALTH INFORMATION MANAGEMENT | Facility: CLINIC | Age: 71
End: 2021-08-10

## 2021-08-12 ENCOUNTER — TRANSFERRED RECORDS (OUTPATIENT)
Dept: HEALTH INFORMATION MANAGEMENT | Facility: CLINIC | Age: 71
End: 2021-08-12

## 2021-08-12 DIAGNOSIS — I10 ESSENTIAL HYPERTENSION: ICD-10-CM

## 2021-08-15 RX ORDER — METOPROLOL SUCCINATE 50 MG/1
TABLET, EXTENDED RELEASE ORAL
Qty: 90 TABLET | Refills: 0 | Status: SHIPPED | OUTPATIENT
Start: 2021-08-15 | End: 2021-11-15

## 2021-08-15 RX ORDER — AMLODIPINE BESYLATE 5 MG/1
TABLET ORAL
Qty: 90 TABLET | Refills: 0 | Status: SHIPPED | OUTPATIENT
Start: 2021-08-15 | End: 2021-11-15

## 2021-08-15 RX ORDER — LISINOPRIL 20 MG/1
TABLET ORAL
Qty: 90 TABLET | Refills: 0 | Status: SHIPPED | OUTPATIENT
Start: 2021-08-15 | End: 2021-11-15

## 2021-08-16 NOTE — TELEPHONE ENCOUNTER
"Last Written Prescription Date:  2/16/21  Last Fill Quantity: 90,  # refills: 1   Last office visit provider:  8/17/20     Requested Prescriptions   Pending Prescriptions Disp Refills     amLODIPine (NORVASC) 5 MG tablet [Pharmacy Med Name: amLODIPine Besylate Oral Tablet 5 MG] 90 tablet 0     Sig: Take 1 tablet (5 mg total) by mouth daily.       Calcium Channel Blockers Protocol  Passed - 8/12/2021  9:18 AM        Passed - Blood pressure under 140/90 in past 12 months     BP Readings from Last 3 Encounters:   12/04/20 126/84   08/17/20 124/80   03/17/20 110/68                 Passed - Recent (12 mo) or future (30 days) visit within the authorizing provider's specialty     Patient has had an office visit with the authorizing provider or a provider within the authorizing providers department within the previous 12 mos or has a future within next 30 days. See \"Patient Info\" tab in inbasket, or \"Choose Columns\" in Meds & Orders section of the refill encounter.              Passed - Medication is active on med list        Passed - Patient is age 18 or older        Passed - Normal serum creatinine on file in past 12 months     Recent Labs   Lab Test 08/17/20  0822   CR 1.07       Ok to refill medication if creatinine is low             lisinopril (ZESTRIL) 20 MG tablet [Pharmacy Med Name: Lisinopril Oral Tablet 20 MG] 90 tablet 0     Sig: Take 1 tablet (20 mg total) by mouth daily.       ACE Inhibitors (Including Combos) Protocol Passed - 8/12/2021  9:18 AM        Passed - Blood pressure under 140/90 in past 12 months     BP Readings from Last 3 Encounters:   12/04/20 126/84   08/17/20 124/80   03/17/20 110/68                 Passed - Recent (12 mo) or future (30 days) visit within the authorizing provider's specialty     Patient has had an office visit with the authorizing provider or a provider within the authorizing providers department within the previous 12 mos or has a future within next 30 days. See \"Patient Info\" " "tab in inbasket, or \"Choose Columns\" in Meds & Orders section of the refill encounter.              Passed - Medication is active on med list        Passed - Patient is age 18 or older        Passed - Normal serum creatinine on file in past 12 months     Recent Labs   Lab Test 08/17/20  0822   CR 1.07       Ok to refill medication if creatinine is low          Passed - Normal serum potassium on file in past 12 months     Recent Labs   Lab Test 08/17/20  0822   POTASSIUM 4.5                metoprolol succinate ER (TOPROL-XL) 50 MG 24 hr tablet [Pharmacy Med Name: Metoprolol Succinate ER Oral Tablet Extended Release 24 Hour 50 MG] 90 tablet 0     Sig: Take 1 tablet (50 mg total) by mouth daily.       Beta-Blockers Protocol Passed - 8/12/2021  9:18 AM        Passed - Blood pressure under 140/90 in past 12 months     BP Readings from Last 3 Encounters:   12/04/20 126/84   08/17/20 124/80   03/17/20 110/68                 Passed - Patient is age 6 or older        Passed - Recent (12 mo) or future (30 days) visit within the authorizing provider's specialty     Patient has had an office visit with the authorizing provider or a provider within the authorizing providers department within the previous 12 mos or has a future within next 30 days. See \"Patient Info\" tab in inbasket, or \"Choose Columns\" in Meds & Orders section of the refill encounter.              Passed - Medication is active on med list             bridgette plascencia RN 08/15/21 8:35 PM  "

## 2021-08-21 ASSESSMENT — ACTIVITIES OF DAILY LIVING (ADL): CURRENT_FUNCTION: NO ASSISTANCE NEEDED

## 2021-08-24 ENCOUNTER — OFFICE VISIT (OUTPATIENT)
Dept: INTERNAL MEDICINE | Facility: CLINIC | Age: 71
End: 2021-08-24
Payer: MEDICARE

## 2021-08-24 VITALS
BODY MASS INDEX: 28.14 KG/M2 | OXYGEN SATURATION: 97 % | WEIGHT: 201 LBS | SYSTOLIC BLOOD PRESSURE: 130 MMHG | DIASTOLIC BLOOD PRESSURE: 82 MMHG | HEIGHT: 71 IN | HEART RATE: 61 BPM

## 2021-08-24 DIAGNOSIS — Z00.00 ROUTINE GENERAL MEDICAL EXAMINATION AT A HEALTH CARE FACILITY: Primary | ICD-10-CM

## 2021-08-24 DIAGNOSIS — Z12.5 SCREENING FOR PROSTATE CANCER: ICD-10-CM

## 2021-08-24 LAB
ALBUMIN SERPL-MCNC: 4.1 G/DL (ref 3.5–5)
ALBUMIN UR-MCNC: NEGATIVE MG/DL
ALP SERPL-CCNC: 96 U/L (ref 45–120)
ALT SERPL W P-5'-P-CCNC: 20 U/L (ref 0–45)
ANION GAP SERPL CALCULATED.3IONS-SCNC: 11 MMOL/L (ref 5–18)
APPEARANCE UR: CLEAR
AST SERPL W P-5'-P-CCNC: 18 U/L (ref 0–40)
BILIRUB SERPL-MCNC: 0.7 MG/DL (ref 0–1)
BILIRUB UR QL STRIP: NEGATIVE
BUN SERPL-MCNC: 23 MG/DL (ref 8–28)
CALCIUM SERPL-MCNC: 10.3 MG/DL (ref 8.5–10.5)
CHLORIDE BLD-SCNC: 106 MMOL/L (ref 98–107)
CHOLEST SERPL-MCNC: 129 MG/DL
CO2 SERPL-SCNC: 24 MMOL/L (ref 22–31)
COLOR UR AUTO: YELLOW
CREAT SERPL-MCNC: 1.2 MG/DL (ref 0.7–1.3)
ERYTHROCYTE [DISTWIDTH] IN BLOOD BY AUTOMATED COUNT: 13.1 % (ref 10–15)
FASTING STATUS PATIENT QL REPORTED: ABNORMAL
GFR SERPL CREATININE-BSD FRML MDRD: 60 ML/MIN/1.73M2
GLUCOSE BLD-MCNC: 91 MG/DL (ref 70–125)
GLUCOSE UR STRIP-MCNC: NEGATIVE MG/DL
HCT VFR BLD AUTO: 47.5 % (ref 40–53)
HDLC SERPL-MCNC: 36 MG/DL
HGB BLD-MCNC: 15.7 G/DL (ref 13.3–17.7)
HGB UR QL STRIP: NEGATIVE
KETONES UR STRIP-MCNC: NEGATIVE MG/DL
LDLC SERPL CALC-MCNC: 72 MG/DL
LEUKOCYTE ESTERASE UR QL STRIP: NEGATIVE
MCH RBC QN AUTO: 29.6 PG (ref 26.5–33)
MCHC RBC AUTO-ENTMCNC: 33.1 G/DL (ref 31.5–36.5)
MCV RBC AUTO: 90 FL (ref 78–100)
NITRATE UR QL: NEGATIVE
PH UR STRIP: 6 [PH] (ref 5–8)
PLATELET # BLD AUTO: 264 10E3/UL (ref 150–450)
POTASSIUM BLD-SCNC: 5.1 MMOL/L (ref 3.5–5)
PROT SERPL-MCNC: 6.8 G/DL (ref 6–8)
PSA SERPL-MCNC: <0.1 UG/L (ref 0–6.5)
RBC # BLD AUTO: 5.3 10E6/UL (ref 4.4–5.9)
SODIUM SERPL-SCNC: 141 MMOL/L (ref 136–145)
SP GR UR STRIP: 1.01 (ref 1–1.03)
TRIGL SERPL-MCNC: 105 MG/DL
TSH SERPL DL<=0.005 MIU/L-ACNC: 1.33 UIU/ML (ref 0.3–5)
UROBILINOGEN UR STRIP-ACNC: 0.2 E.U./DL
WBC # BLD AUTO: 9 10E3/UL (ref 4–11)

## 2021-08-24 PROCEDURE — 81003 URINALYSIS AUTO W/O SCOPE: CPT | Performed by: INTERNAL MEDICINE

## 2021-08-24 PROCEDURE — 84443 ASSAY THYROID STIM HORMONE: CPT | Performed by: INTERNAL MEDICINE

## 2021-08-24 PROCEDURE — 85027 COMPLETE CBC AUTOMATED: CPT | Performed by: INTERNAL MEDICINE

## 2021-08-24 PROCEDURE — G0103 PSA SCREENING: HCPCS | Performed by: INTERNAL MEDICINE

## 2021-08-24 PROCEDURE — 80053 COMPREHEN METABOLIC PANEL: CPT | Performed by: INTERNAL MEDICINE

## 2021-08-24 PROCEDURE — G0439 PPPS, SUBSEQ VISIT: HCPCS | Performed by: INTERNAL MEDICINE

## 2021-08-24 PROCEDURE — 36415 COLL VENOUS BLD VENIPUNCTURE: CPT | Performed by: INTERNAL MEDICINE

## 2021-08-24 PROCEDURE — 80061 LIPID PANEL: CPT | Performed by: INTERNAL MEDICINE

## 2021-08-24 RX ORDER — MELOXICAM 15 MG/1
15 TABLET ORAL DAILY
COMMUNITY
Start: 2021-08-05 | End: 2021-12-07

## 2021-08-24 ASSESSMENT — MIFFLIN-ST. JEOR: SCORE: 1680.92

## 2021-08-24 ASSESSMENT — ACTIVITIES OF DAILY LIVING (ADL): CURRENT_FUNCTION: NO ASSISTANCE NEEDED

## 2021-08-24 NOTE — LETTER
August 26, 2021      Chan Bishop  [address] [facility] [address]        Dear ,    We are writing to inform you of your test results.    {results letter list:764699}    Resulted Orders   CBC with platelets   Result Value Ref Range    WBC Count 9.0 4.0 - 11.0 10e3/uL    RBC Count 5.30 4.40 - 5.90 10e6/uL    Hemoglobin 15.7 13.3 - 17.7 g/dL    Hematocrit 47.5 40.0 - 53.0 %    MCV 90 78 - 100 fL    MCH 29.6 26.5 - 33.0 pg    MCHC 33.1 31.5 - 36.5 g/dL    RDW 13.1 10.0 - 15.0 %    Platelet Count 264 150 - 450 10e3/uL   Comprehensive metabolic panel   Result Value Ref Range    Sodium 141 136 - 145 mmol/L    Potassium 5.1 (H) 3.5 - 5.0 mmol/L    Chloride 106 98 - 107 mmol/L    Carbon Dioxide (CO2) 24 22 - 31 mmol/L    Anion Gap 11 5 - 18 mmol/L    Urea Nitrogen 23 8 - 28 mg/dL    Creatinine 1.20 0.70 - 1.30 mg/dL    Calcium 10.3 8.5 - 10.5 mg/dL    Glucose 91 70 - 125 mg/dL    Alkaline Phosphatase 96 45 - 120 U/L    AST 18 0 - 40 U/L    ALT 20 0 - 45 U/L    Protein Total 6.8 6.0 - 8.0 g/dL    Albumin 4.1 3.5 - 5.0 g/dL    Bilirubin Total 0.7 0.0 - 1.0 mg/dL    GFR Estimate 60 (L) >60 mL/min/1.73m2      Comment:      As of July 11, 2021, eGFR is calculated by the CKD-EPI creatinine equation, without race adjustment. eGFR can be influenced by muscle mass, exercise, and diet. The reported eGFR is an estimation only and is only applicable if the renal function is stable.   Prostate Specific Antigen Screen   Result Value Ref Range    Prostate Specific Antigen Screen <0.10 0.00 - 6.50 ug/L   Lipid panel reflex to direct LDL Fasting   Result Value Ref Range    Cholesterol 129 <=199 mg/dL    Triglycerides 105 <=149 mg/dL    Direct Measure HDL 36 (L) >=40 mg/dL      Comment:      HDL Cholesterol Reference Range:     0-2 years:   No reference ranges established for patients under 2 years old  at [facility] for lipid analytes.    2-8 years:  Greater than 45 mg/dL     18 years and older:   Female: Greater  than or equal to 50 mg/dL   Male:   Greater than or equal to 40 mg/dL    LDL Cholesterol Calculated 72 <=129 mg/dL    Patient Fasting > 8hrs? Unknown    UA reflex to Microscopic and Culture   Result Value Ref Range    Color Urine Yellow Colorless, Straw, Light Yellow, Yellow    Appearance Urine Clear Clear    Glucose Urine Negative Negative mg/dL    Bilirubin Urine Negative Negative    Ketones Urine Negative Negative mg/dL    Specific Gravity Urine 1.010 1.005 - 1.030    Blood Urine Negative Negative    pH Urine 6.0 5.0 - 8.0    Protein Albumin Urine Negative Negative mg/dL    Urobilinogen Urine 0.2 0.2, 1.0 E.U./dL    Nitrite Urine Negative Negative    Leukocyte Esterase Urine Negative Negative    Narrative    Microscopic not indicated       If you have any questions or concerns, please call the clinic at the number listed above.       Sincerely,      Florentin Bill MD

## 2021-08-24 NOTE — PROGRESS NOTES
Annual Wellness Visit:  Chan Bishop  is a 71 year old male  who presents for an annual wellness visit.  Annual wellness visit fasting.    Recent diagnosis of degenerative disc disease L3-4-5 right and left.  Cortisone injections for a number of been given over the last 6 months advised no more than 4 cortisone injections in a 12-month calendar year.    Colonoscopy dated September 23, 2016 with Dr[facility].  Non-smoker no excess alcohol.  He is allergic to sulfa rash.    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  Check today hemogram comprehensive metabolic profile PSA TSH urinalysis and lipid panel.    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  Discussed salt restriction and diet.  Inflammatory arthritis seen by Dr. Dooley diclofenac prescribed.    History of prostate cancer status post robotic prostatectomy with Dr. EUBANKS years ago no sign of recurrence.  Stable.  Immunizations reviewed and up-to-date.    Status post left total hip arthroplasty for osteoarthritis.  Also prior history of ankle fracture without surgery for same.  Past Medical History:   Diagnosis Date     HTN (hypertension)      Hyperlipidemia      Prostate CA (H)      Current Outpatient Medications   Medication     amLODIPine (NORVASC) 5 MG tablet     aspirin 81 mg chewable tablet     lisinopril (ZESTRIL) 20 MG tablet     lovastatin (MEVACOR) 10 MG tablet     magnesium 30 mg tablet     metoprolol succinate ER (TOPROL-XL) 50 MG 24 hr tablet     meloxicam (MOBIC) 15 MG tablet     No current facility-administered medications for this visit.     Immunization History   Administered Date(s) Administered     COVID-19,PF,Pfizer 01/23/2021, 02/12/2021     DT (PEDS <7y) 06/02/2004     FLU 6-35 months 08/28/2010     Flu, Unspecified 09/15/2020     Flu-nasal, Unspecified 09/17/2018      HepA, Unspecified 01/10/2008     HepA-Adult 01/10/2008, 2009     HepB-Adult 2015, 2015, 2016     Influenza (H1N1) 2009     Influenza (High Dose) 3 valent vaccine 2015, 10/14/2016, 2017     Influenza (IIV3) PF 2009     Influenza Quad, Recombinant, p-free (RIV4) 10/17/2019     Influenza Vaccine, 6+MO IM (QUADRIVALENT W/PRESERVATIVES) 2013     Influenza, Quad, High Dose, Pf, 65yr + 09/15/2020     Japanese Encephalitis IM 2010, 2010, 2013     Pneumo Conj 13-V (2010&after) 2015     Pneumococcal 23 valent 2016     Td (Adult), Adsorbed 2004     Td,adult,historic,unspecified 2004, 2009     Tdap (Adacel,Boostrix) 2009     Typhoid Oral 01/10/2008, 2013     Zoster vaccine, live 2015       Surgical History:  Past Surgical History:   Procedure Laterality Date     CV CORONARY ANGIOGRAM N/A 12/3/2019    Procedure: Coronary Angiogram;  Surgeon: Sheryl Mccormick MD;  Location: [facility];  Service: Cardiology     CV LEFT HEART CATHETERIZATION WITHOUT LEFT VENTRICULOGRAM Left 12/3/2019    Procedure: Left Heart Catheterization Without Left Ventriculogram;  Surgeon: Sheryl Mccormick MD;  Location: [facility];  Service: Cardiology     HIP SURGERY       PROSTATECTOMY  1996        Family History:  1 sister  of cancer.  Mother  ovarian cancer age 45 mother  83 after a fall he had Alzheimer's disease.  2 children well 2 grandchildren well wife well supported.  Wife is at a history of coronary vasospasm Prinzmetal angina with myocardial injury secondary to same.    Social History:  Enjoys active lifestyle now retired.  Biology major from [employer].    Health Maintenances:  Immunizations reviewed and up-to-date colonoscopy up-to-date and no sign of cancer - 2016.    Objective:  /82 (BP Location: Right arm, Patient Position: Sitting)   Pulse 61   Ht 1.791 m (5'  "10.5\")   Wt 91.2 kg (201 lb)   SpO2 97%   BMI 28.43 kg/m    Chest clear to auscultation and percussion.  Heart tones regular rhythm without murmur rub or gallop.  Abdomen soft nontender no organomegaly.  No peritoneal signs.  Extremities free of edema cyanosis or clubbing.  Neck veins nondistended no thyromegaly or scleral icterus noted, carotids full.  Skin warm and dry easily conversant good spirited.  Normal intelligence.  Neurologically intact no gross localizing findings.  Skin negative lymph negative neuro negative psych normal HEENT negative back straight no severe spine tenderness wears glasses easily conversant good spirited genital rectal exam negative prostate is surgically absent after prostatectomy for prostate cancer good pulse tone in all 4 extremities no carotid bruits or thyromegaly.  Skin lymph neuropsych all clear easily conversant good spirited wears glasses highly intelligent.    Florentin Bill MD, MD  Answers for HPI/ROS submitted by the patient on 8/21/2021  In general, how would you rate your overall physical health?: good  Frequency of exercise:: 6-7 days/week  Do you usually eat at least 4 servings of fruit and vegetables a day, include whole grains & fiber, and avoid regularly eating high fat or \"junk\" foods? : No  Taking medications regularly:: Yes  Activities of Daily Living: no assistance needed  Home safety: no safety concerns identified  Hearing Impairment:: no hearing concerns  In the past 6 months, have you been bothered by leaking of urine?: Yes  In general, how would you rate your overall mental or emotional health?: excellent  Additional concerns today:: No  Duration of exercise:: 45-60 minutes      "

## 2021-08-24 NOTE — PROGRESS NOTES
"SUBJECTIVE:   Chan Bishop is a 71 year old male who presents for Preventive Visit.    {  Patient has been advised of split billing requirements and indicates understanding: Yes   Are you in the first 12 months of your Medicare coverage?  No    Healthy Habits:     In general, how would you rate your overall health?  Good    Frequency of exercise:  6-7 days/week    Duration of exercise:  45-60 minutes    Do you usually eat at least 4 servings of fruit and vegetables a day, include whole grains    & fiber and avoid regularly eating high fat or \"junk\" foods?  No    Taking medications regularly:  Yes    Ability to successfully perform activities of daily living:  No assistance needed    Home Safety:  No safety concerns identified    Hearing Impairment:  No hearing concerns    In the past 6 months, have you been bothered by leaking of urine? Yes    In general, how would you rate your overall mental or emotional health?  Excellent      PHQ-2 Total Score: 1    Additional concerns today:  No    Do you feel safe in your environment? Yes    Have you ever done Advance Care Planning? (For example, a Health Directive, POLST, or a discussion with a medical provider or your loved ones about your wishes): Yes, patient states has an Advance Care Planning document and will bring a copy to the clinic.      Fall risk  Fallen 2 or more times in the past year?: (P) No  Any fall with injury in the past year?: (P) No    Cognitive Screening three words     2) Clock draw: normal  3) 3 item recall: picture, garden and captain  Results: five    Mini-CogTM Copyright SUSHIL Rodriguez. Licensed by the author for use in [facility]; reprinted with permission ([email or web address]). All rights reserved.      Do you have sleep apnea, excessive snoring or daytime drowsiness?: no    Reviewed and updated as needed this visit by clinical staff  Tobacco  Allergies  Meds              Reviewed and updated as needed this visit by Provider              " "  Social History     Tobacco Use     Smoking status: Never Smoker     Smokeless tobacco: Never Used     Tobacco comment: /70 pulse 76 yesterday per wife   Substance Use Topics     Alcohol use: Yes     Comment: Alcoholic Drinks/day: rare     {Rooming Staff- Complete this question if Prescreen response is not shown below for today's visit. If you drink alcohol do you typically have >3 drinks per day or >7 drinks per week? (Optional):495440}    Alcohol Use 8/21/2021   Prescreen: >3 drinks/day or >7 drinks/week? No   {add AUDIT responses (Optional) (A score of 7 for adult men is an indication of hazardous drinking; a score of 8 or more is an indication of an alcohol use disorder.  A score of 7 or more for adult women is an indication of hazardous drinking or an alchohol use disorder):020767}            Current providers sharing in care for this patient include: {Rooming staff:  Please update Care Team in Rooming Activity, refresh this note and then delete this statement}  Patient Care Team:  Florentin Bill MD as PCP - General  Florentin Bill MD as Assigned PCP  Aneudy Dumont MD as Assigned Heart and Vascular Provider    The following health maintenance items are reviewed in Epic and correct as of today:  Health Maintenance Due   Topic Date Due     ANNUAL REVIEW OF  ORDERS  Never done     HEPATITIS C SCREENING  Never done     ZOSTER IMMUNIZATION (2 of 3) 03/04/2015     AORTIC ANEURYSM SCREENING (SYSTEM ASSIGNED)  Never done     DTAP/TDAP/TD IMMUNIZATION (2 - Td or Tdap) 03/02/2019     FALL RISK ASSESSMENT  08/17/2021     MEDICARE ANNUAL WELLNESS VISIT  08/17/2021     {Chronicprobdata (optional):947198}  {Decision Support (Optional):529631}        Review of Systems  {ROS COMP (Optional):228902}    OBJECTIVE:   /82 (BP Location: Right arm, Patient Position: Sitting)   Pulse 61   Ht 1.791 m (5' 10.5\")   Wt 91.2 kg (201 lb)   SpO2 97%   BMI 28.43 kg/m   Estimated body mass index is 28.43 " "kg/m  as calculated from the following:    Height as of this encounter: 1.791 m (5' 10.5\").    Weight as of this encounter: 91.2 kg (201 lb).  Physical Exam  {Exam (Optional) :475169}    {Diagnostic Test Results (Optional):221359::\"Diagnostic Test Results:\",\"Labs reviewed in Epic\"}    ASSESSMENT / PLAN:   {Diag Picklist:342158}    Patient has been advised of split billing requirements and indicates understanding: {YES / NO:791324::\"Yes\"}  COUNSELING:  {Medicare Counselin}    Estimated body mass index is 28.43 kg/m  as calculated from the following:    Height as of this encounter: 1.791 m (5' 10.5\").    Weight as of this encounter: 91.2 kg (201 lb).    {Weight Management Plan (ACO) Complete if BMI is abnormal-  Ages 18-64  BMI >24.9.  Age 65+ with BMI <23 or >30 (Optional):929824}    He reports that he has never smoked. He has never used smokeless tobacco.      Appropriate preventive services were discussed with this patient, including applicable screening as appropriate for cardiovascular disease, diabetes, osteopenia/osteoporosis, and glaucoma.  As appropriate for age/gender, discussed screening for colorectal cancer, prostate cancer, breast cancer, and cervical cancer. Checklist reviewing preventive services available has been given to the patient.    Reviewed patients plan of care and provided an AVS. The {CarePlan:817622} for Chan meets the Care Plan requirement. This Care Plan has been established and reviewed with the {PATIENT, FAMILY MEMBER, CAREGIVER:918848}.    Counseling Resources:  ATP IV Guidelines  Pooled Cohorts Equation Calculator  Breast Cancer Risk Calculator  Breast Cancer: Medication to Reduce Risk  FRAX Risk Assessment  ICSI Preventive Guidelines  Dietary Guidelines for Americans,   [employer]'s MyPlate  ASA Prophylaxis  Lung CA Screening    Florentin Bill MD  [facility]    Identified Health Risks:  "

## 2021-08-24 NOTE — LETTER
August 24, 2021      Chan Bishop  [address]  [facility] [address]        Dear ,    We are writing to inform you of your test results.    All very good results     Resulted Orders   CBC with platelets   Result Value Ref Range    WBC Count 9.0 4.0 - 11.0 10e3/uL    RBC Count 5.30 4.40 - 5.90 10e6/uL    Hemoglobin 15.7 13.3 - 17.7 g/dL    Hematocrit 47.5 40.0 - 53.0 %    MCV 90 78 - 100 fL    MCH 29.6 26.5 - 33.0 pg    MCHC 33.1 31.5 - 36.5 g/dL    RDW 13.1 10.0 - 15.0 %    Platelet Count 264 150 - 450 10e3/uL   UA reflex to Microscopic and Culture   Result Value Ref Range    Color Urine Yellow Colorless, Straw, Light Yellow, Yellow    Appearance Urine Clear Clear    Glucose Urine Negative Negative mg/dL    Bilirubin Urine Negative Negative    Ketones Urine Negative Negative mg/dL    Specific Gravity Urine 1.010 1.005 - 1.030    Blood Urine Negative Negative    pH Urine 6.0 5.0 - 8.0    Protein Albumin Urine Negative Negative mg/dL    Urobilinogen Urine 0.2 0.2, 1.0 E.U./dL    Nitrite Urine Negative Negative    Leukocyte Esterase Urine Negative Negative    Narrative    Microscopic not indicated       If you have any questions or concerns, please call the clinic at the number listed above.       Sincerely,      Florentin Bill MD

## 2021-08-24 NOTE — LETTER
August 26, 2021      Chan Bishop  [address] [facility] [address]        Dear ,    We are writing to inform you of your test results.  Please stop any and all potassium supplements and all other labs are very good!         Resulted Orders   CBC with platelets   Result Value Ref Range    WBC Count 9.0 4.0 - 11.0 10e3/uL    RBC Count 5.30 4.40 - 5.90 10e6/uL    Hemoglobin 15.7 13.3 - 17.7 g/dL    Hematocrit 47.5 40.0 - 53.0 %    MCV 90 78 - 100 fL    MCH 29.6 26.5 - 33.0 pg    MCHC 33.1 31.5 - 36.5 g/dL    RDW 13.1 10.0 - 15.0 %    Platelet Count 264 150 - 450 10e3/uL   Comprehensive metabolic panel   Result Value Ref Range    Sodium 141 136 - 145 mmol/L    Potassium 5.1 (H) 3.5 - 5.0 mmol/L    Chloride 106 98 - 107 mmol/L    Carbon Dioxide (CO2) 24 22 - 31 mmol/L    Anion Gap 11 5 - 18 mmol/L    Urea Nitrogen 23 8 - 28 mg/dL    Creatinine 1.20 0.70 - 1.30 mg/dL    Calcium 10.3 8.5 - 10.5 mg/dL    Glucose 91 70 - 125 mg/dL    Alkaline Phosphatase 96 45 - 120 U/L    AST 18 0 - 40 U/L    ALT 20 0 - 45 U/L    Protein Total 6.8 6.0 - 8.0 g/dL    Albumin 4.1 3.5 - 5.0 g/dL    Bilirubin Total 0.7 0.0 - 1.0 mg/dL    GFR Estimate 60 (L) >60 mL/min/1.73m2      Comment:      As of July 11, 2021, eGFR is calculated by the CKD-EPI creatinine equation, without race adjustment. eGFR can be influenced by muscle mass, exercise, and diet. The reported eGFR is an estimation only and is only applicable if the renal function is stable.   Prostate Specific Antigen Screen   Result Value Ref Range    Prostate Specific Antigen Screen <0.10 0.00 - 6.50 ug/L   TSH   Result Value Ref Range    TSH 1.33 0.30 - 5.00 uIU/mL   Lipid panel reflex to direct LDL Fasting   Result Value Ref Range    Cholesterol 129 <=199 mg/dL    Triglycerides 105 <=149 mg/dL    Direct Measure HDL 36 (L) >=40 mg/dL      Comment:      HDL Cholesterol Reference Range:     0-2 years:   No reference ranges established for patients under 2 years old  at  [facility] [employer] for lipid analytes.    2-8 years:  Greater than 45 mg/dL     18 years and older:   Female: Greater than or equal to 50 mg/dL   Male:   Greater than or equal to 40 mg/dL    LDL Cholesterol Calculated 72 <=129 mg/dL    Patient Fasting > 8hrs? Unknown    UA reflex to Microscopic and Culture   Result Value Ref Range    Color Urine Yellow Colorless, Straw, Light Yellow, Yellow    Appearance Urine Clear Clear    Glucose Urine Negative Negative mg/dL    Bilirubin Urine Negative Negative    Ketones Urine Negative Negative mg/dL    Specific Gravity Urine 1.010 1.005 - 1.030    Blood Urine Negative Negative    pH Urine 6.0 5.0 - 8.0    Protein Albumin Urine Negative Negative mg/dL    Urobilinogen Urine 0.2 0.2, 1.0 E.U./dL    Nitrite Urine Negative Negative    Leukocyte Esterase Urine Negative Negative    Narrative    Microscopic not indicated       If you have any questions or concerns, please call the clinic at the number listed above.       Sincerely,      Florentin Bill MD

## 2021-10-02 DIAGNOSIS — E88.810 METABOLIC SYNDROME: ICD-10-CM

## 2021-10-03 RX ORDER — LOVASTATIN 10 MG
TABLET ORAL
Qty: 90 TABLET | Refills: 3 | Status: SHIPPED | OUTPATIENT
Start: 2021-10-03 | End: 2022-10-02

## 2021-10-03 NOTE — TELEPHONE ENCOUNTER
"Last Written Prescription Date:  4/5/21  Last Fill Quantity: 90,  # refills: 1   Last office visit provider:  8/24/21     Requested Prescriptions   Pending Prescriptions Disp Refills     lovastatin (MEVACOR) 10 MG tablet [Pharmacy Med Name: Lovastatin Oral Tablet 10 MG] 90 tablet 0     Sig: Take 1 tablet (10 mg total) by mouth at bedtime.       Statins Protocol Passed - 10/2/2021 10:10 AM        Passed - LDL on file in past 12 months     Recent Labs   Lab Test 08/24/21  1046   LDL 72             Passed - No abnormal creatine kinase in past 12 months     No lab results found.             Passed - Recent (12 mo) or future (30 days) visit within the authorizing provider's specialty     Patient has had an office visit with the authorizing provider or a provider within the authorizing providers department within the previous 12 mos or has a future within next 30 days. See \"Patient Info\" tab in inbasket, or \"Choose Columns\" in Meds & Orders section of the refill encounter.              Passed - Medication is active on med list        Passed - Patient is age 18 or older             Michael Duffy RN 10/03/21 10:42 AM  "

## 2021-10-15 ENCOUNTER — HOSPITAL ENCOUNTER (EMERGENCY)
Facility: HOSPITAL | Age: 71
Discharge: HOME | End: 2021-10-16
Attending: EMERGENCY MEDICINE | Admitting: EMERGENCY MEDICINE
Payer: MEDICARE

## 2021-10-15 DIAGNOSIS — J18.9 PNEUMONIA OF BOTH UPPER LOBES DUE TO INFECTIOUS ORGANISM: ICD-10-CM

## 2021-10-15 PROCEDURE — 99284 EMERGENCY DEPT VISIT MOD MDM: CPT | Mod: 25

## 2021-10-15 PROCEDURE — 93005 ELECTROCARDIOGRAM TRACING: CPT | Performed by: EMERGENCY MEDICINE

## 2021-10-15 PROCEDURE — 93005 ELECTROCARDIOGRAM TRACING: CPT

## 2021-10-15 PROCEDURE — 84484 ASSAY OF TROPONIN QUANT: CPT | Performed by: EMERGENCY MEDICINE

## 2021-10-15 PROCEDURE — C9803 HOPD COVID-19 SPEC COLLECT: HCPCS

## 2021-10-15 PROCEDURE — 96360 HYDRATION IV INFUSION INIT: CPT

## 2021-10-15 PROCEDURE — 36415 COLL VENOUS BLD VENIPUNCTURE: CPT | Performed by: EMERGENCY MEDICINE

## 2021-10-15 PROCEDURE — 87635 SARS-COV-2 COVID-19 AMP PRB: CPT | Performed by: EMERGENCY MEDICINE

## 2021-10-15 ASSESSMENT — ENCOUNTER SYMPTOMS
DIARRHEA: 0
VOMITING: 0
CHEST TIGHTNESS: 1
NAUSEA: 0
ABDOMINAL PAIN: 0
APPETITE CHANGE: 0
SHORTNESS OF BREATH: 1

## 2021-10-16 VITALS
OXYGEN SATURATION: 100 % | RESPIRATION RATE: 41 BRPM | SYSTOLIC BLOOD PRESSURE: 148 MMHG | BODY MASS INDEX: 28.29 KG/M2 | TEMPERATURE: 98.2 F | WEIGHT: 200 LBS | DIASTOLIC BLOOD PRESSURE: 69 MMHG | HEART RATE: 71 BPM

## 2021-10-16 LAB
ATRIAL RATE - MUSE: 71 BPM
DIASTOLIC BLOOD PRESSURE - MUSE: NORMAL MMHG
INTERPRETATION ECG - MUSE: NORMAL
P AXIS - MUSE: 55 DEGREES
PR INTERVAL - MUSE: 150 MS
QRS DURATION - MUSE: 136 MS
QT - MUSE: 430 MS
QTC - MUSE: 467 MS
R AXIS - MUSE: -40 DEGREES
SARS-COV-2 RNA RESP QL NAA+PROBE: NEGATIVE
SYSTOLIC BLOOD PRESSURE - MUSE: NORMAL MMHG
T AXIS - MUSE: 48 DEGREES
TROPONIN I SERPL-MCNC: 0.15 NG/ML (ref 0–0.29)
TROPONIN I SERPL-MCNC: 0.15 NG/ML (ref 0–0.29)
VENTRICULAR RATE- MUSE: 71 BPM

## 2021-10-16 PROCEDURE — 258N000003 HC RX IP 258 OP 636: Performed by: EMERGENCY MEDICINE

## 2021-10-16 PROCEDURE — 36415 COLL VENOUS BLD VENIPUNCTURE: CPT | Performed by: EMERGENCY MEDICINE

## 2021-10-16 PROCEDURE — 84484 ASSAY OF TROPONIN QUANT: CPT | Performed by: EMERGENCY MEDICINE

## 2021-10-16 RX ORDER — AZITHROMYCIN 250 MG/1
TABLET, FILM COATED ORAL
Qty: 6 TABLET | Refills: 0 | Status: SHIPPED | OUTPATIENT
Start: 2021-10-16 | End: 2021-12-27

## 2021-10-16 RX ORDER — AZITHROMYCIN 250 MG/1
TABLET, FILM COATED ORAL
Qty: 6 TABLET | Refills: 0 | Status: SHIPPED | OUTPATIENT
Start: 2021-10-16 | End: 2021-10-16

## 2021-10-16 RX ADMIN — SODIUM CHLORIDE 250 ML: 9 INJECTION, SOLUTION INTRAVENOUS at 00:57

## 2021-10-16 NOTE — ED PROVIDER NOTES
72 yo M, history of viral myocarditis in 2019 (EF at that time 55%, but normalized), presented to [facility] for evaluation of intermittent, pleuritic left upper chest pain with SOB.  EKG with RBBB (old) and no ischemic changes, but troponin elevated (0.033).  CTA negative for PE with findings of pneumonia.  Is vaccinated for COVID - [facility] did not test for COVID.    Patient needs cardiac tele admission.     Vitals stable (159/97, 65, 98% on RA and afebrile at 97.2).     Daax Inman MD  10/15/21 [id number]

## 2021-10-16 NOTE — ED NOTES
Bed: JNED-23  Expected date: 10/15/21  Expected time: 10:59 PM  Means of arrival: Ambulance  Comments:

## 2021-10-16 NOTE — DISCHARGE INSTRUCTIONS
Your Covid test was negative.    It does not appear that any of your symptoms are related to blockages in blood vessels of your heart.  Please follow-up in 5 days with your primary care doctor to discuss if you need any additional cardiac testing such as a repeat stress test once you are well.    Complete 5 days of antibiotics and discuss with your primary doctor if you feel your symptoms have not improved.    If you develop severe chest pain with difficulty breathing, fever greater than 100.4 degrees despite taking the antibiotics or any other new/concerning symptoms please return to the emergency department.

## 2021-10-16 NOTE — ED PROVIDER NOTES
EMERGENCY DEPARTMENT ENCOUNTER      NAME: Chan Bishop  AGE: 71 year old male  YOB: 1950  MRN: [id number]  EVALUATION DATE & TIME: 10/15/2021 11:04 PM    PCP: Florentin Bill    ED PROVIDER: Nadiya Cleary M.D.      Chief Complaint   Patient presents with     Chest Pain     pt sent [facility] for further evaluation of chest pain and pneumonia per chest xray         FINAL IMPRESSION:  1. Pneumonia of both upper lobes due to infectious organism        MEDICAL DECISION MAKIN:00 PM I met with the patient, obtained history, performed an initial exam, and discussed options and plan for diagnostics and treatment here in the ED. PPE: Provider wore gloves, goggles, surgical mask, and fabric mask.    Pertinent Labs & Imaging studies reviewed. (See chart for details)       Chan Bishop is a 71 year oldmale who presents from the urgency room for further cardiac evaluation.  Vital signs are normal.  ECG noted a right bundle branch block but otherwise no signs of acute ischemia.  The patient had a generally clean coronary angiogram in 2019 that I reviewed.  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  The concern at the urgency room was a troponin of 0.03 however, the patient has had troponins higher than this in the past.  In the setting of a new pneumonia and his ongoing cardiomyopathy, I am not concerned that this represents cardiac disease as it is the upper limit of normal.  Covid test was not performed so I will do this.    Covid test was negative.  Delta troponin 0 0.15 and 0.15 at 2 hours.  Given a normal troponin in the emergency room, I do feel this is sufficient for cardiac rule out in the setting of pneumonia.  He will get a prescription for azithromycin and I recommended he follow-up with his primary care doctor next Friday to make sure symptoms are getting better and to discuss need for any other cardiac work-up.    We discussed warning signs and indications  to return to the emergency department.  He understands these warning signs and will return with any concerns.       MEDICATIONS GIVEN IN THE EMERGENCY:  Medications   0.9% sodium chloride BOLUS (250 mLs Intravenous New Bag 10/16/21 0057)       NEW PRESCRIPTIONS STARTED AT TODAY'S ER VISIT:  Current Discharge Medication List      START taking these medications    Details   azithromycin (ZITHROMAX Z-NUPUR) 250 MG tablet Two tablets on the first day, then one tablet daily for the next 4 days  Qty: 6 tablet, Refills: 0                =================================================================    HPI    Patient information was obtained from: patient and wife     Use of : N/A        Chan Bishop is a 71 year old male with a history of viral myocarditis, cardiomyopathy, hypertension, and prostate cancer s/p prostatectomy who presents to this ED via EMS from Urgency room for evaluation of chest tightness and elevated troponin.     Per chart review, patient was seen at the Urgency room on 10/15/21 (earlier today) for 1 week of chest tightness and shortness of breath. Troponin was slightly elevated at 0.033 thought to be related to mild cardiac ischemia versus acute renal failure with a creatinine of 1.5. CT scan was negative for PE, thoracic aortic aneurysm, dissection, and pericardial effusion, but did show groundglass opacities in the upper lobes likely consistent with pneumonitis. Patient was started on IV rocephin and transferred to this ED for admission.     Patient reports 1 week of intermittent chest tightness and shortness of breath, noting that it is provoked with deep breaths and coughing. Today, he developed left arm pain and his wife wanted him to be evaluated, although he attributed the arm pain to recently getting the Covid-19 booster in that arm. He subsequently went to the urgency room, where they found pneumonia and an elevated troponin, so he was sent here for admission. Currently, patient  has no chest pain. Patient was not tested for Covid-19 at the urgency room and has received 3 doses of the vaccine. Has a history of hypertension and viral myocarditis (2019) but denies history of other cardiac problems or stenting. No history of hyperlipidemia or diabetes. Non-smoker. Otherwise denies abdominal pain, nausea, vomiting, diarrhea, change in appetite, or any other complaints at this time.      REVIEW OF SYSTEMS   Review of Systems   Constitutional: Negative for appetite change.   Respiratory: Positive for chest tightness and shortness of breath.    Cardiovascular: Negative for chest pain.   Gastrointestinal: Negative for abdominal pain, diarrhea, nausea and vomiting.   All other systems reviewed and are negative.       PAST MEDICAL HISTORY:  Past Medical History:   Diagnosis Date     HTN (hypertension)      Hyperlipidemia      Prostate CA (H)        PAST SURGICAL HISTORY:  Past Surgical History:   Procedure Laterality Date     CV CORONARY ANGIOGRAM N/A 12/3/2019    Procedure: Coronary Angiogram;  Surgeon: Sheryl Mccormick MD;  Location: [facility] Cath Lab;  Service: Cardiology     CV LEFT HEART CATHETERIZATION WITHOUT LEFT VENTRICULOGRAM Left 12/3/2019    Procedure: Left Heart Catheterization Without Left Ventriculogram;  Surgeon: Sheryl Mccormick MD;  Location: [facility] Cath Lab;  Service: Cardiology     HIP SURGERY       PROSTATECTOMY  1996       CURRENT MEDICATIONS:      Current Facility-Administered Medications:      0.9% sodium chloride BOLUS, 250 mL, Intravenous, Once, Nadiya Cleary MD    Current Outpatient Medications:      amLODIPine (NORVASC) 5 MG tablet, Take 1 tablet (5 mg total) by mouth daily., Disp: 90 tablet, Rfl: 0     aspirin 81 mg chewable tablet, [ASPIRIN 81 MG CHEWABLE TABLET] Chew 1 tablet (81 mg total) daily., Disp: , Rfl: 0     lisinopril (ZESTRIL) 20 MG tablet, Take 1 tablet (20 mg total) by mouth daily., Disp: 90 tablet, Rfl: 0     lovastatin (MEVACOR) 10 MG tablet,  Take 1 tablet (10 mg total) by mouth at bedtime., Disp: 90 tablet, Rfl: 3     magnesium 30 mg tablet, Take 30 mg by mouth daily , Disp: , Rfl:      meloxicam (MOBIC) 15 MG tablet, Take 15 mg by mouth daily, Disp: , Rfl:      metoprolol succinate ER (TOPROL-XL) 50 MG 24 hr tablet, Take 1 tablet (50 mg total) by mouth daily., Disp: 90 tablet, Rfl: 0    ALLERGIES:  Allergies   Allergen Reactions     Sulfa (Sulfonamide Antibiotics) [Sulfa Drugs] Rash       FAMILY HISTORY:  Family History   Problem Relation Age of Onset     Ovarian Cancer Mother        SOCIAL HISTORY:   Social History     Tobacco Use     Smoking status: Never Smoker     Smokeless tobacco: Never Used     Tobacco comment: /70 pulse 76 yesterday per wife   Substance Use Topics     Alcohol use: Yes     Comment: Alcoholic Drinks/day: rare     Drug use: No        PHYSICAL EXAM:    Vitals: BP (!) 141/86   Pulse 65   Temp 98.2  F (36.8  C) (Oral)   Resp 16   Wt 90.7 kg (200 lb)   BMI 28.29 kg/m     General:. Alert and interactive, comfortable appearing.  HENT: Oropharynx without erythema or exudates. MMM.  TMs clear bilaterally.  Eyes: Pupils mid-sized and equally reactive.   Neck: Full AROM.  No midline tenderness to palpation.  Cardiovascular: Regular rate and rhythm. Peripheral pulses 2+ bilaterally.  Chest/Pulmonary: Normal work of breathing. Lung sounds clear and equal throughout, no wheezes or crackles. No chest wall tenderness or deformities.  Abdomen: Soft, nondistended. Nontender without guarding or rebound.  Extremities: Normal ROM of all major joints. No lower extremity edema.   Skin: Warm and dry. Normal skin color.   Neuro: Speech clear. CNs grossly intact. Moves all extremities appropriately. Strength and sensation grossly intact to all extremities.   Psych: Normal affect/mood, cooperative, memory appropriate.     LAB:  All pertinent labs reviewed and interpreted.  Labs Ordered and Resulted from Time of ED Arrival Up to the Time of  Departure from the ED   TROPONIN I - Normal   COVID-19 VIRUS (CORONAVIRUS) BY PCR - Normal    Narrative:     Testing was performed using the amaris  SARS-CoV-2 & Influenza A/B Assay on the amaris  Manuela  System.  This test should be ordered for the detection of SARS-COV-2 in individuals who meet SARS-CoV-2 clinical and/or epidemiological criteria. Test performance is unknown in asymptomatic patients.  This test is for in vitro diagnostic use under the FDA EUA for laboratories certified under CLIA to perform moderate and/or high complexity testing. This test has not been FDA cleared or approved.  A negative test does not rule out the presence of PCR inhibitors in the specimen or target RNA in concentration below the limit of detection for the assay. The possibility of a false negative should be considered if the patient's recent exposure or clinical presentation suggests COVID-19.  [facility] are certified under the Clinical Laboratory Improvement Amendments of 1988 (CLIA-88) as qualified to perform moderate and/or high complexity laboratory testing.   TROPONIN I - Normal       EKG:   Sinus rhythm  Right bundle branch block  No abnormal intervals or concerning ST changes  When compared with ECG of 12-3-19, the T wave abnormality noted that ECG is no longer present.  I have independently reviewed and interpreted the EKG(s) documented above.         I, Mary Mckeon, am serving as a scribe to document services personally performed by Dr. Nadiya Cleary  based on my observation and the provider's statements to me. I, Nadiya Cleary MD attest that Mary Mckeon is acting in a scribe capacity, has observed my performance of the services and has documented them in accordance with my direction.      Nadiya Cleary M.D.  Emergency Medicine  [facility] EMERGENCY DEPARTMENT  [facility] [address]  [phone]  Dept: [phone]          Nadiya Cleary MD  10/16/21 0144

## 2021-10-25 ENCOUNTER — OFFICE VISIT (OUTPATIENT)
Dept: INTERNAL MEDICINE | Facility: CLINIC | Age: 71
End: 2021-10-25
Payer: MEDICARE

## 2021-10-25 VITALS
HEART RATE: 63 BPM | HEIGHT: 71 IN | OXYGEN SATURATION: 98 % | WEIGHT: 198 LBS | BODY MASS INDEX: 27.72 KG/M2 | SYSTOLIC BLOOD PRESSURE: 118 MMHG | DIASTOLIC BLOOD PRESSURE: 74 MMHG

## 2021-10-25 DIAGNOSIS — R07.81 PLEURITIC CHEST PAIN: Primary | ICD-10-CM

## 2021-10-25 PROCEDURE — 99213 OFFICE O/P EST LOW 20 MIN: CPT | Performed by: INTERNAL MEDICINE

## 2021-10-25 RX ORDER — AZITHROMYCIN 250 MG/1
TABLET, FILM COATED ORAL
Qty: 6 TABLET | Refills: 0 | Status: SHIPPED | OUTPATIENT
Start: 2021-10-25 | End: 2021-10-30

## 2021-10-25 ASSESSMENT — MIFFLIN-ST. JEOR: SCORE: 1667.31

## 2021-10-25 NOTE — PROGRESS NOTES
"Assessment/Plan:    Pleuritic chest pain check chest x-ray bilateral pneumonia emergency room visit October 15 2021 refill Jeannette.    Prior history of cardiomyopathy previously seen by Dr. Aneudy Dumont from cardiology. With pneumonia troponin level was slightly elevated. Advise reconsultation with Dr. Berkowitz of cardiology group.    25 minutes spent on the date of the encounter doing chart review, review of test results, interpretation of tests, patient visit, documentation and discussion with family     Subjective:  Chan Bishop is a 71 year old male presents for the following health issues pneumonia radiculitis chest pain bilateral. Troponin level slightly elevated at emergency room visit October 15 2021. Previously seen by Dr. Aneudy Dumont for viral myocarditis cardiomyopathy. Just repeat consultation with Dr. Silver refbalaji [id number]-Soy.    ROS:  No blood in stool urine or sputum medication list reviewed reconciled. Allergies sulfa non-smoker no excess alcohol.    Objective:  /74 (BP Location: Right arm, Patient Position: Sitting)   Pulse 63   Ht 1.791 m (5' 10.5\")   Wt 89.8 kg (198 lb)   SpO2 98%   BMI 28.01 kg/m    Scattered crackles posteriorly heart tones normal no cardiac irregularity no murmur no carotid bruits abdomen benign extremities free of edema wife present during examination. Not in acute distress not toxic. Vital signs are stable O2 sats 90% room air  "

## 2021-10-28 ENCOUNTER — TELEPHONE (OUTPATIENT)
Dept: INTERNAL MEDICINE | Facility: CLINIC | Age: 71
End: 2021-10-28

## 2021-10-28 NOTE — TELEPHONE ENCOUNTER
----- Message from Florentin Bill MD sent at 10/26/2021 12:19 PM CDT -----  And please call patient today and tell him all clear on the chest x-ray.

## 2021-11-12 ENCOUNTER — TRANSFERRED RECORDS (OUTPATIENT)
Dept: HEALTH INFORMATION MANAGEMENT | Facility: CLINIC | Age: 71
End: 2021-11-12
Payer: MEDICARE

## 2021-11-13 DIAGNOSIS — I10 ESSENTIAL HYPERTENSION: ICD-10-CM

## 2021-11-15 RX ORDER — AMLODIPINE BESYLATE 5 MG/1
TABLET ORAL
Qty: 90 TABLET | Refills: 3 | Status: SHIPPED | OUTPATIENT
Start: 2021-11-15 | End: 2022-11-13

## 2021-11-15 RX ORDER — LISINOPRIL 20 MG/1
TABLET ORAL
Qty: 90 TABLET | Refills: 0 | Status: SHIPPED | OUTPATIENT
Start: 2021-11-15 | End: 2022-02-11

## 2021-11-15 RX ORDER — METOPROLOL SUCCINATE 50 MG/1
TABLET, EXTENDED RELEASE ORAL
Qty: 90 TABLET | Refills: 3 | Status: SHIPPED | OUTPATIENT
Start: 2021-11-15 | End: 2022-11-07

## 2021-11-15 NOTE — TELEPHONE ENCOUNTER
"Routing refill request to provider for review/approval because:  Labs out of range:  K+    Last Written Prescription Date:  8/15/21  Last Fill Quantity: 90,  # refills: 0   Last office visit provider:  10/25/21     Requested Prescriptions   Pending Prescriptions Disp Refills     lisinopril (ZESTRIL) 20 MG tablet [Pharmacy Med Name: Lisinopril Oral Tablet 20 MG] 90 tablet 0     Sig: Take 1 tablet (20 mg total) by mouth daily.       ACE Inhibitors (Including Combos) Protocol Failed - 11/13/2021  9:07 AM        Failed - Normal serum potassium on file in past 12 months     Recent Labs   Lab Test 08/24/21  1046   POTASSIUM 5.1*             Passed - Blood pressure under 140/90 in past 12 months     BP Readings from Last 3 Encounters:   10/25/21 118/74   10/16/21 (!) 148/69 08/24/21 130/82                 Passed - Recent (12 mo) or future (30 days) visit within the authorizing provider's specialty     Patient has had an office visit with the authorizing provider or a provider within the authorizing providers department within the previous 12 mos or has a future within next 30 days. See \"Patient Info\" tab in inbasket, or \"Choose Columns\" in Meds & Orders section of the refill encounter.              Passed - Medication is active on med list        Passed - Patient is age 18 or older        Passed - Normal serum creatinine on file in past 12 months     Recent Labs   Lab Test 08/24/21  1046   CR 1.20       Ok to refill medication if creatinine is low           Signed Prescriptions Disp Refills    metoprolol succinate ER (TOPROL-XL) 50 MG 24 hr tablet 90 tablet 3     Sig: Take 1 tablet (50 mg total) by mouth daily.       Beta-Blockers Protocol Passed - 11/13/2021  9:07 AM        Passed - Blood pressure under 140/90 in past 12 months     BP Readings from Last 3 Encounters:   10/25/21 118/74   10/16/21 (!) 148/69 08/24/21 130/82                 Passed - Patient is age 6 or older        Passed - Recent (12 mo) or future (30 " "days) visit within the authorizing provider's specialty     Patient has had an office visit with the authorizing provider or a provider within the authorizing providers department within the previous 12 mos or has a future within next 30 days. See \"Patient Info\" tab in inbasket, or \"Choose Columns\" in Meds & Orders section of the refill encounter.              Passed - Medication is active on med list          amLODIPine (NORVASC) 5 MG tablet 90 tablet 3     Sig: Take 1 tablet (5 mg total) by mouth daily.       Calcium Channel Blockers Protocol  Passed - 11/13/2021  9:07 AM        Passed - Blood pressure under 140/90 in past 12 months     BP Readings from Last 3 Encounters:   10/25/21 118/74   10/16/21 (!) 148/69   08/24/21 130/82                 Passed - Recent (12 mo) or future (30 days) visit within the authorizing provider's specialty     Patient has had an office visit with the authorizing provider or a provider within the authorizing providers department within the previous 12 mos or has a future within next 30 days. See \"Patient Info\" tab in inbasket, or \"Choose Columns\" in Meds & Orders section of the refill encounter.              Passed - Medication is active on med list        Passed - Patient is age 18 or older        Passed - Normal serum creatinine on file in past 12 months     Recent Labs   Lab Test 08/24/21  1046   CR 1.20       Ok to refill medication if creatinine is low               Chau Ludwig RN 11/15/21 9:21 AM  "

## 2021-11-15 NOTE — TELEPHONE ENCOUNTER
"Last Written Prescription Date:  8/15/21  Last Fill Quantity: 90,  # refills: 0   Last office visit provider:  10/25/21     Requested Prescriptions   Pending Prescriptions Disp Refills     metoprolol succinate ER (TOPROL-XL) 50 MG 24 hr tablet [Pharmacy Med Name: Metoprolol Succinate ER Oral Tablet Extended Release 24 Hour 50 MG] 90 tablet 0     Sig: Take 1 tablet (50 mg total) by mouth daily.       Beta-Blockers Protocol Passed - 11/13/2021  9:07 AM        Passed - Blood pressure under 140/90 in past 12 months     BP Readings from Last 3 Encounters:   10/25/21 118/74   10/16/21 (!) 148/69   08/24/21 130/82                 Passed - Patient is age 6 or older        Passed - Recent (12 mo) or future (30 days) visit within the authorizing provider's specialty     Patient has had an office visit with the authorizing provider or a provider within the authorizing providers department within the previous 12 mos or has a future within next 30 days. See \"Patient Info\" tab in inbasket, or \"Choose Columns\" in Meds & Orders section of the refill encounter.              Passed - Medication is active on med list           amLODIPine (NORVASC) 5 MG tablet [Pharmacy Med Name: amLODIPine Besylate Oral Tablet 5 MG] 90 tablet 0     Sig: Take 1 tablet (5 mg total) by mouth daily.       Calcium Channel Blockers Protocol  Passed - 11/13/2021  9:07 AM        Passed - Blood pressure under 140/90 in past 12 months     BP Readings from Last 3 Encounters:   10/25/21 118/74   10/16/21 (!) 148/69   08/24/21 130/82                 Passed - Recent (12 mo) or future (30 days) visit within the authorizing provider's specialty     Patient has had an office visit with the authorizing provider or a provider within the authorizing providers department within the previous 12 mos or has a future within next 30 days. See \"Patient Info\" tab in inbasket, or \"Choose Columns\" in Meds & Orders section of the refill encounter.              Passed - Medication " "is active on med list        Passed - Patient is age 18 or older        Passed - Normal serum creatinine on file in past 12 months     Recent Labs   Lab Test 08/24/21  1046   CR 1.20       Ok to refill medication if creatinine is low             lisinopril (ZESTRIL) 20 MG tablet [Pharmacy Med Name: Lisinopril Oral Tablet 20 MG] 90 tablet 0     Sig: Take 1 tablet (20 mg total) by mouth daily.       ACE Inhibitors (Including Combos) Protocol Failed - 11/13/2021  9:07 AM        Failed - Normal serum potassium on file in past 12 months     Recent Labs   Lab Test 08/24/21  1046   POTASSIUM 5.1*             Passed - Blood pressure under 140/90 in past 12 months     BP Readings from Last 3 Encounters:   10/25/21 118/74   10/16/21 (!) 148/69   08/24/21 130/82                 Passed - Recent (12 mo) or future (30 days) visit within the authorizing provider's specialty     Patient has had an office visit with the authorizing provider or a provider within the authorizing providers department within the previous 12 mos or has a future within next 30 days. See \"Patient Info\" tab in inbasket, or \"Choose Columns\" in Meds & Orders section of the refill encounter.              Passed - Medication is active on med list        Passed - Patient is age 18 or older        Passed - Normal serum creatinine on file in past 12 months     Recent Labs   Lab Test 08/24/21  1046   CR 1.20       Ok to refill medication if creatinine is low               Chau Ludwig RN 11/15/21 9:20 AM  "

## 2021-12-03 DIAGNOSIS — I10 ESSENTIAL HYPERTENSION: Primary | ICD-10-CM

## 2021-12-03 NOTE — TELEPHONE ENCOUNTER
Reason for Call:  Medication or medication refill:    Do you use a [facility]?  Name of the pharmacy and phone number for the current request:      [facility] pharmacy on file    Name of the medication requested:     Meloxicam 15 mg tablet    Other request: patient reports he has only 1 day of medication left at this time.    Can we leave a detailed message on this number? YES    Phone number patient can be reached at: Home number on file [phone] (home)    Best Time: Any time    Call taken on 12/3/2021 at 9:46 AM by Hansel Cates

## 2021-12-06 ENCOUNTER — MYC MEDICAL ADVICE (OUTPATIENT)
Dept: INTERNAL MEDICINE | Facility: CLINIC | Age: 71
End: 2021-12-06
Payer: MEDICARE

## 2021-12-06 ENCOUNTER — TELEPHONE (OUTPATIENT)
Dept: INTERNAL MEDICINE | Facility: CLINIC | Age: 71
End: 2021-12-06
Payer: MEDICARE

## 2021-12-06 DIAGNOSIS — I10 ESSENTIAL HYPERTENSION: ICD-10-CM

## 2021-12-06 NOTE — TELEPHONE ENCOUNTER
Patient said he called Thursday for his refill but according to the notes it looks like the call was taken on Friday 12/03/202. He is needing a refill on Meloxicam 15MG, would like a phone call As soon as someone can call him.

## 2021-12-07 RX ORDER — MELOXICAM 15 MG/1
15 TABLET ORAL DAILY
Qty: 100 TABLET | Refills: 11 | Status: SHIPPED | OUTPATIENT
Start: 2021-12-07 | End: 2021-12-08

## 2021-12-08 RX ORDER — MELOXICAM 15 MG/1
15 TABLET ORAL DAILY
Qty: 100 TABLET | Refills: 11 | Status: SHIPPED | OUTPATIENT
Start: 2021-12-08 | End: 2023-02-01

## 2021-12-08 NOTE — TELEPHONE ENCOUNTER
meloxicam (MOBIC) 15 MG tablet 100 tablet 11 12/7/2021  No   Sig - Route: Take 1 tablet (15 mg) by mouth daily - Oral   Sent to pharmacy as: Meloxicam 15 MG Oral Tablet (MOBIC)   Class: E-Prescribe   Order: [id number]   E-Prescribing Status: Receipt confirmed by pharmacy (12/7/2021 12:01 PM CST)     Spoke with patient and confirmed the above rx has been picked up and no further needs at this time   Winston Lama CMA on 12/8/2021 at 2:45 PM

## 2021-12-14 ENCOUNTER — TRANSFERRED RECORDS (OUTPATIENT)
Dept: HEALTH INFORMATION MANAGEMENT | Facility: CLINIC | Age: 71
End: 2021-12-14
Payer: MEDICARE

## 2021-12-27 ENCOUNTER — TRANSFERRED RECORDS (OUTPATIENT)
Dept: HEALTH INFORMATION MANAGEMENT | Facility: CLINIC | Age: 71
End: 2021-12-27

## 2021-12-27 ENCOUNTER — OFFICE VISIT (OUTPATIENT)
Dept: INTERNAL MEDICINE | Facility: CLINIC | Age: 71
End: 2021-12-27
Payer: MEDICARE

## 2021-12-27 VITALS
HEIGHT: 71 IN | OXYGEN SATURATION: 96 % | HEART RATE: 67 BPM | BODY MASS INDEX: 27.86 KG/M2 | SYSTOLIC BLOOD PRESSURE: 122 MMHG | DIASTOLIC BLOOD PRESSURE: 80 MMHG | WEIGHT: 199 LBS

## 2021-12-27 DIAGNOSIS — R07.81 PLEURITIC CHEST PAIN: Primary | ICD-10-CM

## 2021-12-27 PROCEDURE — 99214 OFFICE O/P EST MOD 30 MIN: CPT | Performed by: INTERNAL MEDICINE

## 2021-12-27 ASSESSMENT — MIFFLIN-ST. JEOR: SCORE: 1671.85

## 2021-12-27 NOTE — PROGRESS NOTES
"Assessment/Plan:    Pleuritic chest pain with history of pneumonia check chest x-ray today asymptomatic at this juncture without fever chills pain shortness of breath or cough afebrile    25 minutes spent on the date of the encounter doing chart review, patient visit and documentation     Subjective:  Chan Bishop is a 71 year old male presents for the following health issues symptoms of pneumonia have abated feels well now low back pain with osteoarthritis history of for cortisone injections into his lower lumbar spine this may have affected his immunity making him susceptible to pneumonia.  Discussed with patient follow-up by orthopedist regarding low back pain.  Again he has received \"four cortisone injection within the last year.    ROS:  No blood in sputum stool or urine medication list reviewed reconciled.  Non-smoker no excess alcohol.    Objective:  /80 (BP Location: Right arm, Patient Position: Sitting)   Pulse 67   Ht 1.791 m (5' 10.5\")   Wt 90.3 kg (199 lb)   SpO2 96%   BMI 28.15 kg/m    Chest clear to auscultation and percussion.  Heart tones regular rhythm without murmur rub or gallop.  Abdomen soft nontender no organomegaly.  No peritoneal signs.  Extremities free of edema cyanosis or clubbing.  Neck veins nondistended no thyromegaly or scleral icterus noted, carotids full.  Skin warm and dry easily conversant good spirited.  Normal intelligence.  Neurologically intact no gross localizing findings.    "

## 2022-02-09 DIAGNOSIS — I10 ESSENTIAL HYPERTENSION: ICD-10-CM

## 2022-02-11 RX ORDER — LISINOPRIL 20 MG/1
TABLET ORAL
Qty: 90 TABLET | Refills: 0 | Status: SHIPPED | OUTPATIENT
Start: 2022-02-11 | End: 2022-05-16

## 2022-02-11 NOTE — TELEPHONE ENCOUNTER
"Routing refill request to provider for review/approval because:  Labs out of range:  K+    Last Written Prescription Date:  11/15/21  Last Fill Quantity: 90,  # refills: 0   Last office visit provider:  12/27/21     Requested Prescriptions   Pending Prescriptions Disp Refills     lisinopril (ZESTRIL) 20 MG tablet [Pharmacy Med Name: Lisinopril Oral Tablet 20 MG] 90 tablet 0     Sig: Take 1 tablet (20 mg total) by mouth daily.       ACE Inhibitors (Including Combos) Protocol Failed - 2/11/2022  9:33 AM        Failed - Normal serum potassium on file in past 12 months     Recent Labs   Lab Test 08/24/21  1046   POTASSIUM 5.1*             Passed - Blood pressure under 140/90 in past 12 months     BP Readings from Last 3 Encounters:   12/27/21 122/80   10/25/21 118/74   10/16/21 (!) 148/69                 Passed - Recent (12 mo) or future (30 days) visit within the authorizing provider's specialty     Patient has had an office visit with the authorizing provider or a provider within the authorizing providers department within the previous 12 mos or has a future within next 30 days. See \"Patient Info\" tab in inbasket, or \"Choose Columns\" in Meds & Orders section of the refill encounter.              Passed - Medication is active on med list        Passed - Patient is age 18 or older        Passed - Normal serum creatinine on file in past 12 months     Recent Labs   Lab Test 08/24/21  1046   CR 1.20       Ok to refill medication if creatinine is low               Chau Ludwig RN 02/11/22 9:34 AM  "

## 2022-02-21 ENCOUNTER — OFFICE VISIT (OUTPATIENT)
Dept: INTERNAL MEDICINE | Facility: CLINIC | Age: 72
End: 2022-02-21
Payer: MEDICARE

## 2022-02-21 VITALS
WEIGHT: 197 LBS | BODY MASS INDEX: 27.58 KG/M2 | HEIGHT: 71 IN | DIASTOLIC BLOOD PRESSURE: 84 MMHG | OXYGEN SATURATION: 97 % | HEART RATE: 62 BPM | SYSTOLIC BLOOD PRESSURE: 128 MMHG

## 2022-02-21 DIAGNOSIS — I25.5 ISCHEMIC CARDIOMYOPATHY: Primary | ICD-10-CM

## 2022-02-21 PROCEDURE — 99214 OFFICE O/P EST MOD 30 MIN: CPT | Performed by: INTERNAL MEDICINE

## 2022-02-21 RX ORDER — AZITHROMYCIN 250 MG/1
TABLET, FILM COATED ORAL
Qty: 6 TABLET | Refills: 1 | Status: SHIPPED | OUTPATIENT
Start: 2022-02-21 | End: 2022-02-26

## 2022-02-21 NOTE — PROGRESS NOTES
"Assessment/Plan:    Cardiomyopathy with childhood tachycardia questionable PAT wonder if this is related.  Also history of a coronavirus-like illness December 2019 with associated myocarditis and current electrocardiogram of February 15, 2022 shows bifascicular block PVCs inclusive of left anterior fascicular block and right bundle branch block with a sinus mechanism.  Childhood tachycardia.  Suggest [facility] consultation in the Department of cardiology.  [phone].  Reemphasized salt restriction and diet regular exercise weight loss control of blood pressure and lipid status.  We had a good discussion.  Refill Z-Soy.  Felt better on Z-Soy anti-inflammatory effect.    25 minutes spent on the date of the encounter doing chart review, patient visit and documentation     Subjective:  Chan Bisohp is a 71 year old male presents for the following health issues had shortness of breath given Z-Soy felt better Covid testing done February 15, 2022 [facility] emergency room evaluation of February 15, 2022.    Cardiogram of that same date PVCs bifascicular block sinus mechanism.  Bifascicular block consisting of left anterior fascicular block plus right bundle branch block.    In childhood tachycardia cardiomyopathy.  PAT?.  Tachycardia associated cardiomyopathy.    In December 2019 his wife who accompanies him here today said that he had a chest infection that was ultimately diagnosed as coronavirus not necessarily coronavirus 19 with associated myocarditis.  Needs more full examination and testing at the [facility] would be beneficial.    ROS:  No blood in stool or urine med list reviewed reconciled.    Objective:  /84 (BP Location: Right arm, Patient Position: Sitting)   Pulse 62   Ht 1.791 m (5' 10.5\")   Wt 89.4 kg (197 lb)   SpO2 97%   BMI 27.87 kg/m    Chest clear heart tones normal abdomen benign extremities free of edema neck veins nondistended no thyromegaly or carotid bruits EKG from 2/15/2022 " reviewed showing sinus mechanism PVCs bifascicular block consisting of left anterior fascicular block and right bundle branch block.    Florentin Bill MD  Internal Medicine    Answers for HPI/ROS submitted by the patient on 2/21/2022  How many servings of fruits and vegetables do you eat daily?: 0-1  On average, how many sweetened beverages do you drink each day (Examples: soda, juice, sweet tea, etc.  Do NOT count diet or artificially sweetened beverages)?: 0  How many minutes a day do you exercise enough to make your heart beat faster?: 30 to 60  How many days a week do you exercise enough to make your heart beat faster?: 7  How many days per week do you miss taking your medication?: 0  What is the reason for your visit today?: Follow up to urgency room visit  When did your symptoms begin?: 3-7 days ago  What are your symptoms?: Shortness of breath  How would you describe these symptoms?: Moderate  Are your symptoms:: Improving  Have you had these symptoms before?: Yes  Have you tried or received treatment for these symptoms before?: Yes  Did that treatment work? : Yes  Please describe the treatment you had:: Azithromycin  Is there anything that makes you feel better?: Medication

## 2022-03-05 NOTE — PROGRESS NOTES
"TCM DISCHARGE FOLLOW UP CALL    Discharge Date:  12/4/2019  Reason for hospital stay (discharge diagnosis)::  ACS (acute coronary syndrome)  Are you feeling better, the same or worse since your discharge?:  Patient is feeling better (Denies CP, SOB.  Feels better on new medications.  Angio puncture site on wrist \"looks fine,\" keeping wt off area.)  Do you feel like you have a plan in the event of a health emergency?: Yes (Wife)    As part of your discharge plan, were  home care services ordered for you?: No    Did you receive any new medications, or was there a change to your medications?: Yes    Are you taking those medications, or do you have any established regiment?:  Pt states he's taking amlodipine 5 mg once a day, ASA 81 mg chewable tab once a day, atorvastatin 10 mg at HS, and lisinopril 20 mg once a day.  He picked up NTG tabs but hasn't needed to take any since home, and states he's familiar with how to use NTG if needed.  Pt stopped amlodipine-benazepril as instructed.  States he only uses acyclovir 5% ointment PRN for cold sores, \"maybe once a year.\"  RN advised pt request acyclovir ointment be added back on his med list at his f/u appt 12/9/19, as he still uses it PRN for cold sores.  Pt verbalized understanding and agrees w/plan.  Do you have any follow up visits scheduled with your PCP or Specialist?:  Yes, with PCP and Yes, with Specialist  (RN) Is PCP appt scheduled soon enough (within 14 days of discharge date)?: Yes (Dr. iBll 12/9/19)    Who are you seeing and when is it scheduled?:  Dr. Dumont (cardiologist) 12/18/19      Nina Luna RN Care Manager, Population Health    "
Female

## 2022-03-21 ENCOUNTER — TRANSFERRED RECORDS (OUTPATIENT)
Dept: HEALTH INFORMATION MANAGEMENT | Facility: CLINIC | Age: 72
End: 2022-03-21
Payer: MEDICARE

## 2022-03-28 ENCOUNTER — TRANSFERRED RECORDS (OUTPATIENT)
Dept: HEALTH INFORMATION MANAGEMENT | Facility: CLINIC | Age: 72
End: 2022-03-28
Payer: MEDICARE

## 2022-03-30 ENCOUNTER — MYC MEDICAL ADVICE (OUTPATIENT)
Dept: INTERNAL MEDICINE | Facility: CLINIC | Age: 72
End: 2022-03-30
Payer: MEDICARE

## 2022-03-30 DIAGNOSIS — I10 ESSENTIAL HYPERTENSION: Primary | ICD-10-CM

## 2022-03-30 NOTE — TELEPHONE ENCOUNTER
Huseyin msg:  I just came back from [facility] for the cardiac work up that you recommended.  You should be receiving a letter from Dr Acuna, [facility]ologist, soon.   In the meantime Dr Acuna noted my Overnight Oximetry test showed some fluctuations in my oxygen levels that he is thinking is Sleep Apnea.  [place]   In speaking to my insurance carrier, I will need a physician order.   Since Dr Acuna is making recommendations but leaving follow up and actual orders to you, could you give me an order for sleep evaluation? I don t think you need to specify where but we are leaning towards [facility] is good.  [facility] has a 3-4 month waiting period so [facility] is close.      Additionally, thank you for recommending [facility] cardiology. They were great and got to the bottom of my cardiac issues from Dec 2019.  Pat    Order is conner'd up if you think it is appropriate. If so, please sign. Thank you!    Fede Mackay Jr., CMA on 3/30/2022 at 10:42 AM

## 2022-04-11 ENCOUNTER — TRANSFERRED RECORDS (OUTPATIENT)
Dept: HEALTH INFORMATION MANAGEMENT | Facility: CLINIC | Age: 72
End: 2022-04-11
Payer: MEDICARE

## 2022-05-02 ENCOUNTER — TRANSFERRED RECORDS (OUTPATIENT)
Dept: HEALTH INFORMATION MANAGEMENT | Facility: CLINIC | Age: 72
End: 2022-05-02
Payer: MEDICARE

## 2022-05-03 ASSESSMENT — SLEEP AND FATIGUE QUESTIONNAIRES
HOW LIKELY ARE YOU TO NOD OFF OR FALL ASLEEP IN A CAR, WHILE STOPPED FOR A FEW MINUTES IN TRAFFIC: WOULD NEVER DOZE
HOW LIKELY ARE YOU TO NOD OFF OR FALL ASLEEP WHILE LYING DOWN TO REST IN THE AFTERNOON WHEN CIRCUMSTANCES PERMIT: SLIGHT CHANCE OF DOZING
HOW LIKELY ARE YOU TO NOD OFF OR FALL ASLEEP WHILE SITTING AND TALKING TO SOMEONE: WOULD NEVER DOZE
HOW LIKELY ARE YOU TO NOD OFF OR FALL ASLEEP WHILE WATCHING TV: MODERATE CHANCE OF DOZING
HOW LIKELY ARE YOU TO NOD OFF OR FALL ASLEEP WHILE SITTING AND READING: MODERATE CHANCE OF DOZING
HOW LIKELY ARE YOU TO NOD OFF OR FALL ASLEEP WHEN YOU ARE A PASSENGER IN A CAR FOR AN HOUR WITHOUT A BREAK: WOULD NEVER DOZE
HOW LIKELY ARE YOU TO NOD OFF OR FALL ASLEEP WHILE SITTING QUIETLY AFTER LUNCH WITHOUT ALCOHOL: SLIGHT CHANCE OF DOZING
HOW LIKELY ARE YOU TO NOD OFF OR FALL ASLEEP WHILE SITTING INACTIVE IN A PUBLIC PLACE: WOULD NEVER DOZE

## 2022-05-05 ENCOUNTER — OFFICE VISIT (OUTPATIENT)
Dept: SLEEP MEDICINE | Facility: CLINIC | Age: 72
End: 2022-05-05
Attending: INTERNAL MEDICINE
Payer: MEDICARE

## 2022-05-05 ENCOUNTER — OFFICE VISIT (OUTPATIENT)
Dept: SLEEP MEDICINE | Facility: CLINIC | Age: 72
End: 2022-05-05

## 2022-05-05 VITALS
DIASTOLIC BLOOD PRESSURE: 73 MMHG | HEIGHT: 72 IN | BODY MASS INDEX: 27.24 KG/M2 | SYSTOLIC BLOOD PRESSURE: 106 MMHG | WEIGHT: 201.1 LBS | HEART RATE: 61 BPM | OXYGEN SATURATION: 97 %

## 2022-05-05 DIAGNOSIS — I10 ESSENTIAL HYPERTENSION: ICD-10-CM

## 2022-05-05 DIAGNOSIS — M95.0 ACQUIRED NASAL DEFORMITY: ICD-10-CM

## 2022-05-05 DIAGNOSIS — R06.81 APNEA: ICD-10-CM

## 2022-05-05 DIAGNOSIS — R06.83 SNORING: ICD-10-CM

## 2022-05-05 DIAGNOSIS — R06.83 SNORING: Primary | ICD-10-CM

## 2022-05-05 PROCEDURE — 99204 OFFICE O/P NEW MOD 45 MIN: CPT | Performed by: OTOLARYNGOLOGY

## 2022-05-05 NOTE — PROGRESS NOTES
Does Chan have a CPAP/Bipap?  No     [place] Sleep Scale: 6   BMI: 27.66    Outpatient Sleep Medicine Consultation:      Name: Chan Bishop MRN# [id number]   Age: 71 year old YOB: 1950     Date of Consultation: May 5, 2022  Consultation is requested by: Florentin Bill MD  [address] [place] [address] Florentin Bill  Primary care provider: Florentin Bill       Reason for Sleep Consult:     Chan Bishop is sent by Florentin Bill for a sleep consultation regarding possible sleep disordered breathing based on the history as well as significant oxygen desaturation index and the time of oxygen saturation below 89% of 6 minutes during nocturnal oximetry.    Patient s Reason for visit  Chan Bishop main reason for visit: [facility] recommended a sleep study after an overnight oximeter study showed a desaturation event drop in SPO2 by 4% got 10 sec and pulse event change by atleast 6bpm for a minimum of 8 sec.  Mean stat of 94% w/ sev periods of oscil desat to as low as 85%  Patient states problem(s) started: Was at [facility] March 2022.  Has had intermit sleep disruption for years  Chan Bishop's goals for this visit: to find out if I really do have sleep apnea and to sleep without waking up.  Have been taking 1 tab benedryl at night since March and am sleeping more soundly with less waking up           Assessment and Plan:     Summary Sleep Diagnoses:  Snoring, apneas, hypoxemia    Comorbid Diagnoses:  Myocarditis, hypertension      Summary Recommendations:  Patient is interested in home sleep study.  Based on stop bang score of 5  No orders of the defined types were placed in this encounter.        Summary Counseling:    Sleep Testing Reviewed  Obstructive Sleep Apnea Reviewed  Complications of Untreated Sleep Apnea Reviewed      Medical Decision-making:   Educational materials provided in instructions    Total time spent reviewing medical records, history and physical  examination, review of previous testing and interpretation as well as documentation on this date: 35 minutes    CC: Florentin Bill          History of Present Illness:     Past Sleep Evaluations:    SLEEP-WAKE SCHEDULE:     Work/School Days: Patient goes to school/work: No   Usually gets into bed at 10 pm  Takes patient about 15 minutes to fall asleep  Has trouble falling asleep 0 nights per week  Wakes up in the middle of the night 1 times.  Wakes up due to Use the bathroom  He has trouble falling back asleep 2-3 times week times a week.   It usually takes prior to taking Benadryl and Flonase it took at least one hour to fall back asleep and after ENT visit to clear congestion to get back to sleep  Patient is usually up at 6:30-7:am  Uses alarm: No    Weekends/Non-work Days/All Other Days:  Usually gets into bed at 10:00 pm   Takes patient about 15 minutes to fall asleep  Patient is usually up at 6:00-7:00 am  Uses alarm: No    Sleep Need  Patient gets  6-7 hours maybe 8 with the Benadryl and Flonase sleep on average   Patient thinks he needs about 7-8 hours sleep    Chanalis Bishop prefers to sleep in this position(s): Side   Patient states they do the following activities in bed:      Naps  Patient takes a purposeful nap occasionally times a week and naps are usually 1 hour in duration  He feels better after a nap: Yes  He dozes off unintentionally at night while I'm watching TV days per week  Patient has had a driving accident or near-miss due to sleepiness/drowsiness: No      SLEEP DISRUPTIONS:    Breathing/Snoring  Patient snores:Yes  Other people complain about his snoring: Yes  Patient has been told he stops breathing in his sleep: No  He has issues with the following: Morning mouth dryness, Stuffy nose when you wake up, Getting up to urinate more than once    Movement:  Patient gets pain, discomfort, with an urge to move:  No  It happens when he is resting:  No  It happens more at night:  No  Patient  has been told he kicks his legs at night:  No     Behaviors in Sleep:  Chan Bishop has experienced the following behaviors while sleeping:    He has experienced sudden muscle weakness during the day: No      Is there anything else you would like your sleep provider to know: snoring has decreased significantly since using Flonase and Benedryl.  But still have concern why my O2 sat decreased and HR increased with overnight oximeter at [place]        CAFFEINE AND OTHER SUBSTANCES:    Patient consumes caffeinated beverages per day:  3 cups of coffee in morning and occasionally a soda at dinner  Last caffeine use is usually: on a typical day 8:30 am  List of any prescribed or over the counter stimulants that patient takes: 0  List of any prescribed or over the counter sleep medication patient takes: Benadryl  List of previous sleep medications that patient has tried: None  Patient drinks alcohol to help them sleep: No  Patient drinks alcohol near bedtime: No    Family History:  Patient has a family member been diagnosed with a sleep disorder: Yes  Brother, Sister and Daughter         SCALES:    EPWORTH SLEEPINESS SCALE      Landers Sleepiness Scale ( JUAN Oneil  [id number]<br>ESS - [place]/English - Final version - 21 Nov 07 - [facility] [place].) 5/3/2022   Sitting and reading Moderate chance of dozing   Watching TV Moderate chance of dozing   Sitting, inactive in a public place (e.g. a theatre or a meeting) Would never doze   As a passenger in a car for an hour without a break Would never doze   Lying down to rest in the afternoon when circumstances permit Slight chance of dozing   Sitting and talking to someone Would never doze   Sitting quietly after a lunch without alcohol Slight chance of dozing   In a car, while stopped for a few minutes in traffic Would never doze   Landers Score (MC) 0   Landers Score (Sleep) 6         INSOMNIA SEVERITY INDEX (BIANKA)      Insomnia Severity Index (BIANKA) 5/3/2022   Difficulty  "falling asleep 0   Difficulty staying asleep 1   Problems waking up too early 1   How SATISFIED/DISSATISFIED are you with your CURRENT sleep pattern? 2   How NOTICEABLE to others do you think your sleep problem is in terms of impairing the quality of your life? 2   How WORRIED/DISTRESSED are you about your current sleep problem? 2   To what extent do you consider your sleep problem to INTERFERE with your daily functioning (e.g. daytime fatigue, mood, ability to function at work/daily chores, concentration, memory, mood, etc.) CURRENTLY? 2   BIANKA Total Score 10       Guidelines for Scoring/Interpretation:  Total score categories:  0-7 = No clinically significant insomnia   8-14 = Subthreshold insomnia   15-21 = Clinical insomnia (moderate severity)  22-28 = Clinical insomnia (severe)  Used via courtesy of www.Nevolutionth.va.gov with permission from Aneudy Kelly PhD., [facility]      STOP BANG     STOP BANG Questionnaire (  2008, the American Society of Anesthesiologists, Inc. [employer] 5/3/2022   1. Snoring - Do you snore loudly (louder than talking or loud enough to be heard through closed doors)? No   2. Tired - Do you often feel tired, fatigued, or sleepy during daytime? Yes   3. Observed - Has anyone observed you stop breathing during your sleep? No   4. Blood pressure - Do you have or are you being treated for high blood pressure? Yes   5. BMI - BMI more than 35 kg/m2? No   6. Age - Age over 50 yr old? Yes   7. Neck circumference - Neck circumference greater than 40 cm? Yes   8. Gender - Gender male? Yes   STOP BANG Score (MC): 4 (High risk of CM)   B/P Clinic: -   BMI Clinic: -         GAD7    DEVON-7  8/17/2020   1. Feeling nervous, anxious, or on edge 0   2. Not being able to stop or control worrying 0         CAGE-AID    No flowsheet data found.    CAGE-AID reprinted with permission from the [place] Medical Journal, CHARLEEN Bishop. and ROSAS Walsh, \"Conjoint screening " "questionnaires for alcohol and drug abuse\" [place] Journal 94: 135-140, 1995.      PATIENT HEALTH QUESTIONNAIRE-9 (PHQ - 9)    PHQ-9 ([employer]) 8/17/2020   1.  Little interest or pleasure in doing things 0   2.  Feeling down, depressed, or hopeless 0       Developed by Mallory Burns, Genesis Andre, Gianni Albarado and colleagues, with an educational alessia from [employer] No permission required to reproduce, translate, display or distribute.        Allergies:    Allergies   Allergen Reactions     Sulfa (Sulfonamide Antibiotics) [Sulfa Drugs] Rash       Medications:    Current Outpatient Medications   Medication Sig Dispense Refill     amLODIPine (NORVASC) 5 MG tablet Take 1 tablet (5 mg total) by mouth daily. 90 tablet 3     aspirin 81 mg chewable tablet [ASPIRIN 81 MG CHEWABLE TABLET] Chew 1 tablet (81 mg total) daily.  0     lisinopril (ZESTRIL) 20 MG tablet Take 1 tablet (20 mg total) by mouth daily. 90 tablet 0     lovastatin (MEVACOR) 10 MG tablet Take 1 tablet (10 mg total) by mouth at bedtime. 90 tablet 3     magnesium 30 mg tablet Take 30 mg by mouth daily        meloxicam (MOBIC) 15 MG tablet Take 1 tablet (15 mg) by mouth daily 100 tablet 11     metoprolol succinate ER (TOPROL-XL) 50 MG 24 hr tablet Take 1 tablet (50 mg total) by mouth daily. 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     Priority: Medium     Cardiomyopathy, unspecified type (H)      Priority: Medium     Elevated troponin 12/02/2019     Priority: Medium     ACS (acute coronary syndrome) (H) 12/02/2019     Priority: Medium     Pleuritic chest pain 12/02/2019     Priority: Medium     Intermittent- d dimer normal in urgency center         Adenocarcinoma Of The Prostate Gland      Priority: Medium     1996; prostatectomy         Essential hypertension      Priority: Medium     Created by Conversion  Replacement Utility updated for latest IMO load     "     Reactions To Sulfa Drugs      Priority: Medium     Created by Conversion            Past Medical/Surgical History:  Past Medical History:   Diagnosis Date     HTN (hypertension)      Hyperlipidemia      Prostate CA (H)      Past Surgical History:   Procedure Laterality Date     CV CORONARY ANGIOGRAM N/A 12/3/2019    Procedure: Coronary Angiogram;  Surgeon: Sheryl Mccormick MD;  Location: [facility] Lab;  Service: Cardiology     CV LEFT HEART CATHETERIZATION WITHOUT LEFT VENTRICULOGRAM Left 12/3/2019    Procedure: Left Heart Catheterization Without Left Ventriculogram;  Surgeon: Sheryl Mccormick MD;  Location: [facility] Cath Lab;  Service: Cardiology     HIP SURGERY       PROSTATECTOMY  1996       Social History:  Social History     Socioeconomic History     Marital status:      Spouse name: Not on file     Number of children: 2     Years of education: Not on file     Highest education level: Not on file   Occupational History     Not on file   Tobacco Use     Smoking status: Never Smoker     Smokeless tobacco: Never Used     Tobacco comment: /70 pulse 76 yesterday per wife   Substance and Sexual Activity     Alcohol use: Not Currently     Comment: Alcoholic Drinks/day: rare     Drug use: No     Sexual activity: Not on file   Other Topics Concern     Not on file   Social History Narrative     40 yrs, [place],  2 kids; commercial printing  Non smoker/occ  drinker       Social Determinants of Health     Financial Resource Strain: Not on file   Food Insecurity: Not on file   Transportation Needs: Not on file   Physical Activity: Not on file   Stress: Not on file   Social Connections: Not on file   Intimate Partner Violence: Not on file   Housing Stability: Not on file       Family History:  Family History   Problem Relation Age of Onset     Ovarian Cancer Mother        Review of Systems:  A complete review of systems reviewed by me is negative with the exeption of what has been mentioned in  the history of present illness.  In the last TWO WEEKS have you experienced any of the following symptoms?  Fevers: No  Night Sweats: No  Weight Gain: No  Pain at Night: No  Double Vision: No  Changes in Vision: No  Difficulty Breathing through Nose: Yes  Sore Throat in Morning: No  Dry Mouth in the Morning: Yes  Shortness of Breath Lying Flat: No  Shortness of Breath With Activity: No  Awakening with Shortness of Breath: No  Increased Cough: No  Heart Racing at Night: Yes  Swelling in Feet or Legs: No  Diarrhea at Night: No  Heartburn at Night: No  Urinating More than Once at Night: Yes  Losing Control of Urine at Night: No  Joint Pains at Night: No  Headaches in Morning: No  Weakness in Arms or Legs: No  Depressed Mood: No  Anxiety: No     Physical Examination:  Vitals: There were no vitals taken for this visit.  BMI= There is no height or weight on file to calculate BMI.           GENERAL APPEARANCE: healthy, alert, no distress and cooperative  EYES: Eyes grossly normal to inspection, PERRL and conjunctivae and sclerae normal  HENT: ear canals and TM's normal, nose and mouth without ulcers or lesions, oropharynx crowded, uvula elongated and soft palate dependent  NECK: no adenopathy, no asymmetry, masses, or scars and thyroid normal to palpation  NEURO: Normal strength and tone, mentation intact and speech normal  PSYCH: mentation appears normal and affect normal/bright  Mallampati Class: III.  Tonsillar Stage: 1  hidden by pillars.  Class II occlusion appreciated.  Nasal exam- very deviated nasal septum to the right large inferior turbinates.  Significant acquired nasal deformity of the cartilaginous dorsum with deficient lateral part of the upper lateral cartilages on the left side and left nasal valve collapse.       Data: All pertinent previous laboratory data reviewed     Recent Labs   Lab Test 08/24/21  1046 08/17/20  0822    138   POTASSIUM 5.1* 4.5   CHLORIDE 106 106   CO2 24 24   ANIONGAP 11 8    GLC 91 91   BUN 23 25   CR 1.20 1.07   LILIA 10.3 10.0       Recent Labs   Lab Test 08/24/21  1046   WBC 9.0   RBC 5.30   HGB 15.7   HCT 47.5   MCV 90   MCH 29.6   MCHC 33.1   RDW 13.1          Recent Labs   Lab Test 08/24/21  1046   PROTTOTAL 6.8   ALBUMIN 4.1   BILITOTAL 0.7   ALKPHOS 96   AST 18   ALT 20       TSH (uIU/mL)   Date Value   08/24/2021 1.33   08/17/2020 1.62       No results found for: UAMP, UBARB, BENZODIAZEUR, UCANN, UCOC, OPIT, UPCP    No results found for: IRONSAT, [id number], WIL    No results found for: PH, PHARTERIAL, PO2, [id number], SAT, PCO2, HCO3, BASEEXCESS, MORA, BEB    @LABRCNTIPR(phv:4,pco2v:4,po2v:4,hco3v:4,sarah:4,o2per:4)@    Echocardiology: No results found for this or any previous visit (from the past 4320 hour(s)).    Chest x-ray: XR Chest 2 Views 12/27/2021    Narrative  EXAM DATE:         12/27/2021    EXAM: X-RAY CHEST, 2 VIEWS, FRONTAL AND LATERAL  LOCATION: [facility]  DATE/TIME: 12/27/2021 12:30 PM    INDICATION: fup pneumonia  COMPARISON: 10/25/2021, 12/02/2019    IMPRESSION: Heart size appears normal. Lungs appear clear. No acute infiltrate or mass seen.      Chest CT: No results found for this or any previous visit from the past 365 days.      PFT: Most Recent [facility] Pulmonary Function Testing    No results found for: 20001  No results found for: 20002  No results found for: 20003  No results found for: 20015  No results found for: 20016  No results found for: 20027  No results found for: 20028  No results found for: 20029  No results found for: 20079  No results found for: 20080  No results found for: 20081  No results found for: 20335  No results found for: 20105  No results found for: 20053  No results found for: 20054  No results found for: 20055      Vidal Saldaña CMA 5/5/2022

## 2022-05-06 ENCOUNTER — DOCUMENTATION ONLY (OUTPATIENT)
Dept: SLEEP MEDICINE | Facility: CLINIC | Age: 72
End: 2022-05-06
Payer: MEDICARE

## 2022-05-06 PROBLEM — R06.83 SNORING: Status: ACTIVE | Noted: 2022-05-06

## 2022-05-06 NOTE — PROGRESS NOTES
HST POST-STUDY QUESTIONNAIRE    1. What time did you go to bed?  10:10  2. How long do you think it took to fall asleep?  30 minutes  3. What time did you wake up to start the day?  5:45  4. Did you get up during the night at all?  yes  5. If you woke up, do you remember approximately what time(s)?11,12:30,5:10,5:45   6. Did you have any difficulty with the equipment?  No  7. Did you us any type of treatment with this study?  None  8. Was the head of the bed elevated? No  9. Did you sleep in a recliner?  No  10. Did you stop using CPAP at least 3 days before this test?  NA  11. Any other information you'd like us to know?

## 2022-05-13 DIAGNOSIS — I10 ESSENTIAL HYPERTENSION: ICD-10-CM

## 2022-05-13 NOTE — PROGRESS NOTES
This HSAT was performed using a Noxturnal T3 device which recorded snore, sound, movement activity, body position, nasal pressure, oronasal thermal airflow, pulse, oximetry and both chest and abdominal respiratory effort. HSAT data was restricted to the time patient states they were in bed.     HSAT was scored using 1B 4% hypopnea rule.     AHI: 11.9. Snoring was reported as loud.  Time with SpO2 below 89% was 0.7 minutes.   Overall signal quality was good     Pt will follow up with sleep provider to determine appropriate therapy.     Ordering Provider, Alan Granados MD Charles O., BA, RPSGT, RST System Clinical Specialist 5/13/2022

## 2022-05-15 NOTE — TELEPHONE ENCOUNTER
"Routing refill request to provider for review/approval because:  Labs not current:  K+    Last Written Prescription Date:  2/11/22  Last Fill Quantity: 90,  # refills: 0   Last office visit provider:  2/21/22     Requested Prescriptions   Pending Prescriptions Disp Refills     lisinopril (ZESTRIL) 20 MG tablet [Pharmacy Med Name: Lisinopril Oral Tablet 20 MG] 90 tablet 0     Sig: Take 1 tablet (20 mg total) by mouth daily.       ACE Inhibitors (Including Combos) Protocol Failed - 5/13/2022  6:23 AM        Failed - Normal serum potassium on file in past 12 months     Recent Labs   Lab Test 08/24/21  1046   POTASSIUM 5.1*             Passed - Blood pressure under 140/90 in past 12 months     BP Readings from Last 3 Encounters:   05/05/22 106/73   02/21/22 128/84   12/27/21 122/80                 Passed - Recent (12 mo) or future (30 days) visit within the authorizing provider's specialty     Patient has had an office visit with the authorizing provider or a provider within the authorizing providers department within the previous 12 mos or has a future within next 30 days. See \"Patient Info\" tab in inbasket, or \"Choose Columns\" in Meds & Orders section of the refill encounter.              Passed - Medication is active on med list        Passed - Patient is age 18 or older        Passed - Normal serum creatinine on file in past 12 months     Recent Labs   Lab Test 08/24/21  1046   CR 1.20       Ok to refill medication if creatinine is low               Karina Callahan 05/15/22 9:14 AM  "
none

## 2022-05-16 RX ORDER — LISINOPRIL 20 MG/1
TABLET ORAL
Qty: 90 TABLET | Refills: 0 | Status: SHIPPED | OUTPATIENT
Start: 2022-05-16 | End: 2022-08-16

## 2022-05-25 ENCOUNTER — TELEPHONE (OUTPATIENT)
Dept: UROLOGY | Facility: CLINIC | Age: 72
End: 2022-05-25
Payer: MEDICARE

## 2022-05-25 ENCOUNTER — TRANSFERRED RECORDS (OUTPATIENT)
Dept: HEALTH INFORMATION MANAGEMENT | Facility: CLINIC | Age: 72
End: 2022-05-25
Payer: MEDICARE

## 2022-05-25 NOTE — TELEPHONE ENCOUNTER
[facility] Call Center    Phone Message    May a detailed message be left on voicemail: yes     Reason for Call: Natalia from [facility] is calling to schedule appt for patient.  Patient was seen in ER 05/24/22 for a ureteral stone and flank pain.  Please reach out to Natalia to schedule patient, thank you.    Action Taken: Other: [facility]    Travel Screening: Not Applicable

## 2022-06-09 ENCOUNTER — OFFICE VISIT (OUTPATIENT)
Dept: OTOLARYNGOLOGY | Facility: CLINIC | Age: 72
End: 2022-06-09
Payer: MEDICARE

## 2022-06-09 VITALS
SYSTOLIC BLOOD PRESSURE: 139 MMHG | DIASTOLIC BLOOD PRESSURE: 82 MMHG | WEIGHT: 195 LBS | BODY MASS INDEX: 26.82 KG/M2 | HEART RATE: 69 BPM

## 2022-06-09 DIAGNOSIS — M95.0 ACQUIRED NASAL DEFORMITY: Primary | ICD-10-CM

## 2022-06-09 PROCEDURE — 99213 OFFICE O/P EST LOW 20 MIN: CPT | Performed by: OTOLARYNGOLOGY

## 2022-06-09 NOTE — LETTER
6/9/2022         RE: Chan Bishop  [address]  [facility] [address]        Dear Colleague,    Thank you for referring your patient, Chan Bishop, to the [facility]. Please see a copy of my visit note below.    No notes on file    Again, thank you for allowing me to participate in the care of your patient.        Sincerely,        Alan Granados MD, MD

## 2022-06-10 ENCOUNTER — TRANSFERRED RECORDS (OUTPATIENT)
Dept: HEALTH INFORMATION MANAGEMENT | Facility: CLINIC | Age: 72
End: 2022-06-10
Payer: MEDICARE

## 2022-06-27 NOTE — PROGRESS NOTES
[address]  [facility] [address]  Phone: [phone]  Fax: [phone]  Primary Provider: Florentin Bill  Pre-op Performing Provider: LUPE STALEY      PREOPERATIVE EVALUATION:  Today's date: 6/28/2022    Chan Bishop is a 72 year old male who presents for a preoperative evaluation.    Surgical Information:  Surgery/Procedure: URS  Surgery Location: [facility]   Surgeon: Tc Allen   Surgery Date: 07/12/2022  Time of Surgery: TBD  Where patient plans to recover: At home with family  Fax number for surgical facility: [phone]    Type of Anesthesia Anticipated: to be determined    Assessment & Plan     The proposed surgical procedure is considered INTERMEDIATE risk.    Kidney stone  5mm UVJ stone on left side, not passing on its own. Asymptomatic. Plan for procedure by [facility].    Preop general physical exam  - CBC with platelets and differential; Future  - Basic metabolic panel  (Ca, Cl, CO2, Creat, Gluc, K, Na, BUN); Future  - EKG 12-lead, tracing only    Cardiomyopathy, unspecified type (H)  Hx of viral myocarditis back in 2019, now EF resolved with last echo 3/2022 showing EF 56%. He does have enhancement of basal inferolateral left ventricular wall segment into basal inferior wall and mid myocardial basal wall segment as sequela of myocarditis. Asymptomatic, great functional capacity > 4 METS.       Medication Instructions:  - Take all of your medications before surgery except as noted below  - On morning of surgery: hold lisinopril and meloxicam  - Hold aspirin 5 days before as advised    RECOMMENDATION:  APPROVAL GIVEN to proceed with proposed procedure.    Lupe Staley MD  Internal Medicine and Pediatrics      Subjective     HPI related to upcoming procedure:     Seen in urgent care. Diagnosed with diverticulitis and kidney stone.    Completed course of cipro/flagyl but had dizziness and leg  "numbness on it. No more abdominal pain.    Saw urology in follow up.    5 mm left UVJ stone, not passing on its own. Plan to do URS. No symptoms.    History of viral myocarditis back in 2019. He was \"very sick\" for 6 months. Now he's asymptomatic, sees cardiology once a year. Last seen March 2022, has had recovered EF but still some enhancement on MRI in inferolateral walls at base and mid. No functional limitations, walks 3 miles a day with no chest pain or SOB.    Goes to [place] once a year. Had negative stress EKG in March.      Preop Questions 6/28/2022   1. Have you ever had a heart attack or stroke? No   2. Have you ever had surgery on your heart or blood vessels, such as a stent placement, a coronary artery bypass, or surgery on an artery in your head, neck, heart, or legs? No   3. Do you have chest pain with activity? No   4. Do you have a history of  heart failure? UNKNOWN - history of viral myocarditis, last EF was normal   5. Do you currently have a cold, bronchitis or symptoms of other infection? No   6. Do you have a cough, shortness of breath, or wheezing? No   7. Do you or anyone in your family have previous history of blood clots? No   8. Do you or does anyone in your family have a serious bleeding problem such as prolonged bleeding following surgeries or cuts? No   9. Have you ever had problems with anemia or been told to take iron pills? No   10. Have you had any abnormal blood loss such as black, tarry or bloody stools? No   11. Have you ever had a blood transfusion? No   12. Are you willing to have a blood transfusion if it is medically needed before, during, or after your surgery? Yes   13. Have you or any of your relatives ever had problems with anesthesia? No   14. Do you have sleep apnea, excessive snoring or daytime drowsiness? No   15. Do you have any artifical heart valves or other implanted medical devices like a pacemaker, defibrillator, or continuous glucose monitor? No   16. Do you have " artificial joints? YES - hip resurfacing   17. Are you allergic to latex? No     Health Care Directive:  Patient does not have a Health Care Directive or Living Will: Patient states has Advance Directive and will bring in a copy to clinic.    Preoperative Review of :   reviewed - no record of controlled substances prescribed.      Status of Chronic Conditions:  See problem list for active medical problems.  Problems all longstanding and stable, except as noted/documented.  See ROS for pertinent symptoms related to these conditions.      Review of Systems  CONSTITUTIONAL: NEGATIVE for fever, chills, change in weight  INTEGUMENTARY/SKIN: NEGATIVE for worrisome rashes, moles or lesions  EYES: NEGATIVE for vision changes or irritation  ENT/MOUTH: NEGATIVE for ear, mouth and throat problems  RESP: NEGATIVE for significant cough or SOB  CV: NEGATIVE for chest pain, palpitations or peripheral edema  GI: NEGATIVE for nausea, abdominal pain, heartburn, or change in bowel habits  : NEGATIVE for frequency, dysuria, or hematuria  MUSCULOSKELETAL: NEGATIVE for significant arthralgias or myalgia  NEURO: NEGATIVE for weakness, dizziness or paresthesias  ENDOCRINE: NEGATIVE for temperature intolerance, skin/hair changes  HEME: NEGATIVE for bleeding problems  PSYCHIATRIC: NEGATIVE for changes in mood or affect    Patient Active Problem List    Diagnosis Date Noted     Kidney stone 05/24/2022     Priority: Medium     Snoring 05/06/2022     Priority: Medium     Viral myocarditis 12/04/2020     Priority: Medium     Cardiomyopathy, unspecified type (H)      Priority: Medium     ACS (acute coronary syndrome) (H) 12/02/2019     Priority: Medium     Pleuritic chest pain 12/02/2019     Priority: Medium     Intermittent- d dimer normal in urgency center         Adenocarcinoma Of The Prostate Gland      Priority: Medium     1996; prostatectomy         Essential hypertension      Priority: Medium     Created by Conversion  Replacement  Utility updated for latest IMO load         Reactions To Sulfa Drugs      Priority: Medium     Created by Conversion          Past Medical History:   Diagnosis Date     Arthritis Osteoarthritis 2020     Congestive heart failure (H) 2021     Heart disease december 2019     HTN (hypertension)      Hyperlipidemia      Prostate CA (H)      Past Surgical History:   Procedure Laterality Date     BIOPSY  1996     COLONOSCOPY  2017     CV CORONARY ANGIOGRAM N/A 12/03/2019    Procedure: Coronary Angiogram;  Surgeon: Sheryl Mccormick MD;  Location: [facility];  Service: Cardiology     CV LEFT HEART CATHETERIZATION WITHOUT LEFT VENTRICULOGRAM Left 12/03/2019    Procedure: Left Heart Catheterization Without Left Ventriculogram;  Surgeon: Sheryl Mccormick MD;  Location: [facility] Lab;  Service: Cardiology     HIP SURGERY       ORTHOPEDIC SURGERY  Hip resurfacing 2010     PROSTATECTOMY  01/01/1996     Current Outpatient Medications   Medication Sig Dispense Refill     amLODIPine (NORVASC) 5 MG tablet Take 1 tablet (5 mg total) by mouth daily. 90 tablet 3     aspirin 81 mg chewable tablet [ASPIRIN 81 MG CHEWABLE TABLET] Chew 1 tablet (81 mg total) daily.  0     lisinopril (ZESTRIL) 20 MG tablet Take 1 tablet (20 mg total) by mouth daily. 90 tablet 0     lovastatin (MEVACOR) 10 MG tablet Take 1 tablet (10 mg total) by mouth at bedtime. 90 tablet 3     magnesium 30 mg tablet Take 400 mg by mouth daily       meloxicam (MOBIC) 15 MG tablet Take 1 tablet (15 mg) by mouth daily 100 tablet 11     metoprolol succinate ER (TOPROL-XL) 50 MG 24 hr tablet Take 1 tablet (50 mg total) by mouth daily. 90 tablet 3       Allergies   Allergen Reactions     Ciprofloxacin      Dizziness and leg numbness     Metronidazole      Dizziness and leg numbness     Sulfa (Sulfonamide Antibiotics) [Sulfa Drugs] Rash        Social History     Tobacco Use     Smoking status: Never Smoker     Smokeless tobacco: Never Used     Tobacco comment: BP  "100/70 pulse 76 yesterday per wife   Substance Use Topics     Alcohol use: Not Currently     Comment: rare     Family History   Problem Relation Age of Onset     Ovarian Cancer Mother      Other Cancer Mother      Hypertension Father      Cerebrovascular Disease Paternal Grandfather      Diabetes Other         Aunt     Prostate Cancer Brother      Other Cancer Sister      History   Drug Use No         Objective     /70 (BP Location: Right arm, Patient Position: Sitting, Cuff Size: Adult Regular)   Pulse 80   Temp 98.3  F (36.8  C) (Oral)   Resp 20   Ht 1.765 m (5' 9.49\")   Wt 87.5 kg (193 lb)   SpO2 97%   BMI 28.10 kg/m      Physical Exam    GENERAL APPEARANCE: healthy, alert and no distress     EYES: EOMI,  PERRL     HENT: ear canals and TM's normal and nose and mouth without ulcers or lesions     NECK: no adenopathy, no asymmetry, masses, or scars and thyroid normal to palpation     RESP: lungs clear to auscultation - no rales, rhonchi or wheezes     CV: regular rates and rhythm, normal S1 S2, no S3 or S4 and no murmur, click or rub     ABDOMEN:  soft, nontender, no HSM or masses and bowel sounds normal     MS: extremities normal- no gross deformities noted, no evidence of inflammation in joints, FROM in all extremities.     SKIN: no suspicious lesions or rashes     NEURO: Normal strength and tone, sensory exam grossly normal, mentation intact and speech normal     PSYCH: mentation appears normal. and affect normal/bright     LYMPHATICS: No cervical adenopathy    Recent Labs   Lab Test 08/24/21  1046 08/17/20  0822   HGB 15.7 16.5    241    138   POTASSIUM 5.1* 4.5   CR 1.20 1.07        Diagnostics:  Recent Results (from the past 168 hour(s))   EKG 12-lead, tracing only    Collection Time: 06/28/22  1:52 PM   Result Value Ref Range    Systolic Blood Pressure  mmHg    Diastolic Blood Pressure  mmHg    Ventricular Rate 59 BPM    Atrial Rate 59 BPM    MO Interval 148 ms    QRS Duration 142 " ms     ms    QTc 463 ms    P Axis 55 degrees    R AXIS -42 degrees    T Axis 30 degrees    Interpretation ECG       Sinus bradycardia  Left axis deviation  Right bundle branch block  Abnormal ECG  When compared with ECG of 15-OCT-2021 23:08,  No significant change was found  Confirmed by NEW ROMERO MD LOC: ([id number]) on 6/28/2022 2:57:04 PM     Basic metabolic panel  (Ca, Cl, CO2, Creat, Gluc, K, Na, BUN)    Collection Time: 06/28/22  3:02 PM   Result Value Ref Range    Sodium 142 136 - 145 mmol/L    Potassium 4.6 3.5 - 5.0 mmol/L    Chloride 108 (H) 98 - 107 mmol/L    Carbon Dioxide (CO2) 25 22 - 31 mmol/L    Anion Gap 9 5 - 18 mmol/L    Urea Nitrogen 29 (H) 8 - 28 mg/dL    Creatinine 1.14 0.70 - 1.30 mg/dL    Calcium 10.2 8.5 - 10.5 mg/dL    Glucose 86 70 - 125 mg/dL    GFR Estimate 68 >60 mL/min/1.73m2   CBC with platelets and differential    Collection Time: 06/28/22  3:02 PM   Result Value Ref Range    WBC Count 9.5 4.0 - 11.0 10e3/uL    RBC Count 4.96 4.40 - 5.90 10e6/uL    Hemoglobin 14.7 13.3 - 17.7 g/dL    Hematocrit 43.6 40.0 - 53.0 %    MCV 88 78 - 100 fL    MCH 29.6 26.5 - 33.0 pg    MCHC 33.7 31.5 - 36.5 g/dL    RDW 13.5 10.0 - 15.0 %    Platelet Count 251 150 - 450 10e3/uL    % Neutrophils 64 %    % Lymphocytes 22 %    % Monocytes 11 %    % Eosinophils 3 %    % Basophils 0 %    % Immature Granulocytes 0 %    Absolute Neutrophils 6.1 1.6 - 8.3 10e3/uL    Absolute Lymphocytes 2.1 0.8 - 5.3 10e3/uL    Absolute Monocytes 1.0 0.0 - 1.3 10e3/uL    Absolute Eosinophils 0.2 0.0 - 0.7 10e3/uL    Absolute Basophils 0.0 0.0 - 0.2 10e3/uL    Absolute Immature Granulocytes 0.0 <=0.4 10e3/uL      EKG: sinus bradycardia, RBBB, left axis deviation (unchanged)    Revised Cardiac Risk Index (RCRI):  The patient has the following serious cardiovascular risks for perioperative complications:   - No serious cardiac risks = 0 points     RCRI Interpretation: 0 points: Class I (very low risk - 0.4% complication  rate)       Signed Electronically by: José Miguel Staley MD  Copy of this evaluation report is provided to requesting physician.

## 2022-06-28 ENCOUNTER — OFFICE VISIT (OUTPATIENT)
Dept: INTERNAL MEDICINE | Facility: CLINIC | Age: 72
End: 2022-06-28
Payer: MEDICARE

## 2022-06-28 VITALS
HEART RATE: 80 BPM | WEIGHT: 193 LBS | SYSTOLIC BLOOD PRESSURE: 108 MMHG | HEIGHT: 69 IN | RESPIRATION RATE: 20 BRPM | DIASTOLIC BLOOD PRESSURE: 70 MMHG | TEMPERATURE: 98.3 F | OXYGEN SATURATION: 97 % | BODY MASS INDEX: 28.58 KG/M2

## 2022-06-28 DIAGNOSIS — N20.0 KIDNEY STONE: Primary | ICD-10-CM

## 2022-06-28 DIAGNOSIS — Z01.818 PREOP GENERAL PHYSICAL EXAM: ICD-10-CM

## 2022-06-28 DIAGNOSIS — I42.9 CARDIOMYOPATHY, UNSPECIFIED TYPE (H): ICD-10-CM

## 2022-06-28 PROBLEM — R79.89 ELEVATED TROPONIN: Status: RESOLVED | Noted: 2019-12-02 | Resolved: 2022-06-28

## 2022-06-28 LAB
ANION GAP SERPL CALCULATED.3IONS-SCNC: 9 MMOL/L (ref 5–18)
ATRIAL RATE - MUSE: 59 BPM
BASOPHILS # BLD AUTO: 0 10E3/UL (ref 0–0.2)
BASOPHILS NFR BLD AUTO: 0 %
BUN SERPL-MCNC: 29 MG/DL (ref 8–28)
CALCIUM SERPL-MCNC: 10.2 MG/DL (ref 8.5–10.5)
CHLORIDE BLD-SCNC: 108 MMOL/L (ref 98–107)
CO2 SERPL-SCNC: 25 MMOL/L (ref 22–31)
CREAT SERPL-MCNC: 1.14 MG/DL (ref 0.7–1.3)
DIASTOLIC BLOOD PRESSURE - MUSE: NORMAL MMHG
EOSINOPHIL # BLD AUTO: 0.2 10E3/UL (ref 0–0.7)
EOSINOPHIL NFR BLD AUTO: 3 %
ERYTHROCYTE [DISTWIDTH] IN BLOOD BY AUTOMATED COUNT: 13.5 % (ref 10–15)
GFR SERPL CREATININE-BSD FRML MDRD: 68 ML/MIN/1.73M2
GLUCOSE BLD-MCNC: 86 MG/DL (ref 70–125)
HCT VFR BLD AUTO: 43.6 % (ref 40–53)
HGB BLD-MCNC: 14.7 G/DL (ref 13.3–17.7)
IMM GRANULOCYTES # BLD: 0 10E3/UL
IMM GRANULOCYTES NFR BLD: 0 %
INTERPRETATION ECG - MUSE: NORMAL
LYMPHOCYTES # BLD AUTO: 2.1 10E3/UL (ref 0.8–5.3)
LYMPHOCYTES NFR BLD AUTO: 22 %
MCH RBC QN AUTO: 29.6 PG (ref 26.5–33)
MCHC RBC AUTO-ENTMCNC: 33.7 G/DL (ref 31.5–36.5)
MCV RBC AUTO: 88 FL (ref 78–100)
MONOCYTES # BLD AUTO: 1 10E3/UL (ref 0–1.3)
MONOCYTES NFR BLD AUTO: 11 %
NEUTROPHILS # BLD AUTO: 6.1 10E3/UL (ref 1.6–8.3)
NEUTROPHILS NFR BLD AUTO: 64 %
P AXIS - MUSE: 55 DEGREES
PLATELET # BLD AUTO: 251 10E3/UL (ref 150–450)
POTASSIUM BLD-SCNC: 4.6 MMOL/L (ref 3.5–5)
PR INTERVAL - MUSE: 148 MS
QRS DURATION - MUSE: 142 MS
QT - MUSE: 468 MS
QTC - MUSE: 463 MS
R AXIS - MUSE: -42 DEGREES
RBC # BLD AUTO: 4.96 10E6/UL (ref 4.4–5.9)
SODIUM SERPL-SCNC: 142 MMOL/L (ref 136–145)
SYSTOLIC BLOOD PRESSURE - MUSE: NORMAL MMHG
T AXIS - MUSE: 30 DEGREES
VENTRICULAR RATE- MUSE: 59 BPM
WBC # BLD AUTO: 9.5 10E3/UL (ref 4–11)

## 2022-06-28 PROCEDURE — 80048 BASIC METABOLIC PNL TOTAL CA: CPT | Performed by: PEDIATRICS

## 2022-06-28 PROCEDURE — 93010 ELECTROCARDIOGRAM REPORT: CPT | Mod: OFF | Performed by: INTERNAL MEDICINE

## 2022-06-28 PROCEDURE — 99214 OFFICE O/P EST MOD 30 MIN: CPT | Performed by: PEDIATRICS

## 2022-06-28 PROCEDURE — 85025 COMPLETE CBC W/AUTO DIFF WBC: CPT | Performed by: PEDIATRICS

## 2022-06-28 PROCEDURE — 93005 ELECTROCARDIOGRAM TRACING: CPT | Performed by: PEDIATRICS

## 2022-06-28 PROCEDURE — 36415 COLL VENOUS BLD VENIPUNCTURE: CPT | Performed by: PEDIATRICS

## 2022-06-28 ASSESSMENT — PAIN SCALES - GENERAL: PAINLEVEL: NO PAIN (0)

## 2022-06-28 NOTE — PATIENT INSTRUCTIONS
Preparing for Your Surgery  Getting started  A nurse will call you to review your health history and instructions. They will give you an arrival time based on your scheduled surgery time. Please be ready to share:  Your doctor's clinic name and phone number  Your medical, surgical and anesthesia history  A list of allergies and sensitivities  A list of medicines, including herbal treatments and over-the-counter drugs  Whether the patient has a legal guardian (ask how to send us the papers in advance)  Please tell us if you're pregnant--or if there's any chance you might be pregnant. Some surgeries may injure a fetus (unborn baby), so they require a pregnancy test. Surgeries that are safe for a fetus don't always need a test, and you can choose whether to have one.   If you have a child who's having surgery, please ask for a copy of Preparing for Your Child's Surgery.    Preparing for surgery  Within 30 days of surgery: Have a pre-op exam (sometimes called an H&P, or History and Physical). This can be done at a clinic or pre-operative center.  If you're having a , you may not need this exam. Talk to your care team.  At your pre-op exam, talk to your care team about all medicines you take. If you need to stop any medicines before surgery, ask when to start taking them again.  We do this for your safety. Many medicines can make you bleed too much during surgery. Some change how well surgery (anesthesia) drugs work.  Call your insurance company to let them know you're having surgery. (If you don't have insurance, call [phone].)  Call your clinic if there's any change in your health. This includes signs of a cold or flu (sore throat, runny nose, cough, rash, fever). It also includes a scrape or scratch near the surgery site.  If you have questions on the day of surgery, call your hospital or surgery center.  COVID testing  You may need to be tested for COVID-19 before having surgery. If so, we will give  you instructions.  Eating and drinking guidelines  For your safety: Unless your surgeon tells you otherwise, follow the guidelines below.  Eat and drink as usual until 8 hours before surgery. After that, no food or milk.  Drink clear liquids until 2 hours before surgery. These are liquids you can see through, like water, Gatorade and Propel Water. You may also have black coffee and tea (no cream or milk).  Nothing by mouth within 2 hours of surgery. This includes gum, candy and breath mints.  If you drink alcohol: Stop drinking it the night before surgery.  If your care team tells you to take medicine on the morning of surgery, it's okay to take it with a sip of water.  Preventing infection  Shower or bathe the night before and morning of your surgery. Follow the instructions your clinic gave you. (If no instructions, use regular soap.)  Don't shave or clip hair near your surgery site. We'll remove the hair if needed.  Don't smoke or vape the morning of surgery. You may chew nicotine gum up to 2 hours before surgery. A nicotine patch is okay.  Note: Some surgeries require you to completely quit smoking and nicotine. Check with your surgeon.  Your care team will make every effort to keep you safe from infection. We will:  Clean our hands often with soap and water (or an alcohol-based hand rub).  Clean the skin at your surgery site with a special soap that kills germs.  Give you a special gown to keep you warm. (Cold raises the risk of infection.)  Wear special hair covers, masks, gowns and gloves during surgery.  Give antibiotic medicine, if prescribed. Not all surgeries need antibiotics.  What to bring on the day of surgery  Photo ID and insurance card  Copy of your health care directive, if you have one  Glasses and hearing aides (bring cases)  You can't wear contacts during surgery  Inhaler and eye drops, if you use them (tell us about these when you arrive)  CPAP machine or breathing device, if you use them  A  few personal items, if spending the night  If you have . . .  A pacemaker, ICD (cardiac defibrillator) or other implant: Bring the ID card.  An implanted stimulator: Bring the remote control.  A legal guardian: Bring a copy of the certified (court-stamped) guardianship papers.  Please remove any jewelry, including body piercings. Leave jewelry and other valuables at home.  If you're going home the day of surgery  You must have a responsible adult drive you home. They should stay with you overnight as well.  If you don't have someone to stay with you, and you aren't safe to go home alone, we may keep you overnight. Insurance often won't pay for this.  After surgery  If it's hard to control your pain or you need more pain medicine, please call your surgeon's office.  Questions?   If you have any questions for your care team, list them here: _________________________________________________________________________________________________________________________________________________________________________ ____________________________________ ____________________________________ ____________________________________  For informational purposes only. Not to replace the advice of your health care provider. Copyright   2003, 2019 [employer]. All rights reserved. Clinically reviewed by Rebecca Rivero MD. [employer] [id number] - REV 07/21.    How to Take Your Medication Before Surgery  - Take all of your medications before surgery except as noted below  - On morning of surgery: hold lisinopril and meloxicam  - Hold aspirin 5 days before as advised

## 2022-06-29 NOTE — RESULT ENCOUNTER NOTE
Your blood counts, electrolytes, and kidney function look good. Hope everything goes well with your surgery!    Dr. Mclaughlin

## 2022-07-08 ENCOUNTER — LAB (OUTPATIENT)
Dept: FAMILY MEDICINE | Facility: CLINIC | Age: 72
End: 2022-07-08
Payer: MEDICARE

## 2022-07-08 DIAGNOSIS — Z20.822 ENCOUNTER FOR LABORATORY TESTING FOR COVID-19 VIRUS: ICD-10-CM

## 2022-07-08 LAB — SARS-COV-2 RNA RESP QL NAA+PROBE: NEGATIVE

## 2022-07-08 PROCEDURE — U0005 INFEC AGEN DETEC AMPLI PROBE: HCPCS

## 2022-07-08 PROCEDURE — U0003 INFECTIOUS AGENT DETECTION BY NUCLEIC ACID (DNA OR RNA); SEVERE ACUTE RESPIRATORY SYNDROME CORONAVIRUS 2 (SARS-COV-2) (CORONAVIRUS DISEASE [COVID-19]), AMPLIFIED PROBE TECHNIQUE, MAKING USE OF HIGH THROUGHPUT TECHNOLOGIES AS DESCRIBED BY CMS-2020-01-R: HCPCS

## 2022-07-19 ENCOUNTER — TRANSFERRED RECORDS (OUTPATIENT)
Dept: HEALTH INFORMATION MANAGEMENT | Facility: CLINIC | Age: 72
End: 2022-07-19

## 2022-08-16 DIAGNOSIS — I10 ESSENTIAL HYPERTENSION: ICD-10-CM

## 2022-08-16 RX ORDER — LISINOPRIL 20 MG/1
TABLET ORAL
Qty: 90 TABLET | Refills: 1 | Status: SHIPPED | OUTPATIENT
Start: 2022-08-16 | End: 2023-02-08

## 2022-08-16 NOTE — TELEPHONE ENCOUNTER
"Last Written Prescription Date:  5/16/22  Last Fill Quantity: 90,  # refills: 0   Last office visit provider:  2/21/22     Requested Prescriptions   Pending Prescriptions Disp Refills     lisinopril (ZESTRIL) 20 MG tablet [Pharmacy Med Name: Lisinopril Oral Tablet 20 MG] 90 tablet 0     Sig: Take 1 tablet (20 mg total) by mouth daily.       ACE Inhibitors (Including Combos) Protocol Passed - 8/16/2022  7:24 AM        Passed - Blood pressure under 140/90 in past 12 months     BP Readings from Last 3 Encounters:   06/28/22 108/70   06/09/22 139/82   05/05/22 106/73                 Passed - Recent (12 mo) or future (30 days) visit within the authorizing provider's specialty     Patient has had an office visit with the authorizing provider or a provider within the authorizing providers department within the previous 12 mos or has a future within next 30 days. See \"Patient Info\" tab in inbasket, or \"Choose Columns\" in Meds & Orders section of the refill encounter.              Passed - Medication is active on med list        Passed - Patient is age 18 or older        Passed - Normal serum creatinine on file in past 12 months     Recent Labs   Lab Test 06/28/22  1502   CR 1.14       Ok to refill medication if creatinine is low          Passed - Normal serum potassium on file in past 12 months     Recent Labs   Lab Test 06/28/22  1502   POTASSIUM 4.6                  Brigida Lee RN 08/16/22 5:51 PM  "

## 2022-08-25 ENCOUNTER — OFFICE VISIT (OUTPATIENT)
Dept: INTERNAL MEDICINE | Facility: CLINIC | Age: 72
End: 2022-08-25
Payer: MEDICARE

## 2022-08-25 VITALS
BODY MASS INDEX: 27.35 KG/M2 | HEIGHT: 70 IN | TEMPERATURE: 98.3 F | RESPIRATION RATE: 18 BRPM | DIASTOLIC BLOOD PRESSURE: 84 MMHG | HEART RATE: 57 BPM | WEIGHT: 191 LBS | SYSTOLIC BLOOD PRESSURE: 128 MMHG | OXYGEN SATURATION: 98 %

## 2022-08-25 DIAGNOSIS — Z00.00 ROUTINE GENERAL MEDICAL EXAMINATION AT A HEALTH CARE FACILITY: Primary | ICD-10-CM

## 2022-08-25 DIAGNOSIS — Z12.5 SCREENING FOR PROSTATE CANCER: ICD-10-CM

## 2022-08-25 LAB
ALBUMIN SERPL BCG-MCNC: 4.1 G/DL (ref 3.5–5.2)
ALBUMIN UR-MCNC: NEGATIVE MG/DL
ALP SERPL-CCNC: 97 U/L (ref 40–129)
ALT SERPL W P-5'-P-CCNC: 24 U/L (ref 10–50)
ANION GAP SERPL CALCULATED.3IONS-SCNC: 9 MMOL/L (ref 7–15)
APPEARANCE UR: CLEAR
AST SERPL W P-5'-P-CCNC: 31 U/L (ref 10–50)
BILIRUB SERPL-MCNC: 0.5 MG/DL
BILIRUB UR QL STRIP: NEGATIVE
BUN SERPL-MCNC: 26.4 MG/DL (ref 8–23)
CALCIUM SERPL-MCNC: 9.9 MG/DL (ref 8.8–10.2)
CHLORIDE SERPL-SCNC: 107 MMOL/L (ref 98–107)
CHOLEST SERPL-MCNC: 109 MG/DL
COLOR UR AUTO: YELLOW
CREAT SERPL-MCNC: 1.09 MG/DL (ref 0.67–1.17)
DEPRECATED HCO3 PLAS-SCNC: 24 MMOL/L (ref 22–29)
ERYTHROCYTE [DISTWIDTH] IN BLOOD BY AUTOMATED COUNT: 13.5 % (ref 10–15)
GFR SERPL CREATININE-BSD FRML MDRD: 72 ML/MIN/1.73M2
GLUCOSE SERPL-MCNC: 99 MG/DL (ref 70–99)
GLUCOSE UR STRIP-MCNC: NEGATIVE MG/DL
HCT VFR BLD AUTO: 45.1 % (ref 40–53)
HDLC SERPL-MCNC: 32 MG/DL
HGB BLD-MCNC: 15.2 G/DL (ref 13.3–17.7)
HGB UR QL STRIP: NEGATIVE
KETONES UR STRIP-MCNC: NEGATIVE MG/DL
LDLC SERPL CALC-MCNC: 62 MG/DL
LEUKOCYTE ESTERASE UR QL STRIP: NEGATIVE
MCH RBC QN AUTO: 29.7 PG (ref 26.5–33)
MCHC RBC AUTO-ENTMCNC: 33.7 G/DL (ref 31.5–36.5)
MCV RBC AUTO: 88 FL (ref 78–100)
NITRATE UR QL: NEGATIVE
NONHDLC SERPL-MCNC: 77 MG/DL
PH UR STRIP: 5.5 [PH] (ref 5–8)
PLATELET # BLD AUTO: 239 10E3/UL (ref 150–450)
POTASSIUM SERPL-SCNC: 4.6 MMOL/L (ref 3.4–5.3)
PROT SERPL-MCNC: 6.8 G/DL (ref 6.4–8.3)
PSA SERPL-MCNC: 0.02 NG/ML (ref 0–6.5)
RBC # BLD AUTO: 5.11 10E6/UL (ref 4.4–5.9)
SODIUM SERPL-SCNC: 140 MMOL/L (ref 136–145)
SP GR UR STRIP: 1.01 (ref 1–1.03)
TRIGL SERPL-MCNC: 73 MG/DL
TSH SERPL DL<=0.005 MIU/L-ACNC: 1.59 UIU/ML (ref 0.3–4.2)
UROBILINOGEN UR STRIP-ACNC: 0.2 E.U./DL
WBC # BLD AUTO: 7.8 10E3/UL (ref 4–11)

## 2022-08-25 PROCEDURE — 84443 ASSAY THYROID STIM HORMONE: CPT | Performed by: INTERNAL MEDICINE

## 2022-08-25 PROCEDURE — 85027 COMPLETE CBC AUTOMATED: CPT | Performed by: INTERNAL MEDICINE

## 2022-08-25 PROCEDURE — 80053 COMPREHEN METABOLIC PANEL: CPT | Performed by: INTERNAL MEDICINE

## 2022-08-25 PROCEDURE — 36415 COLL VENOUS BLD VENIPUNCTURE: CPT | Performed by: INTERNAL MEDICINE

## 2022-08-25 PROCEDURE — 80061 LIPID PANEL: CPT | Performed by: INTERNAL MEDICINE

## 2022-08-25 PROCEDURE — G0439 PPPS, SUBSEQ VISIT: HCPCS | Performed by: INTERNAL MEDICINE

## 2022-08-25 PROCEDURE — 81003 URINALYSIS AUTO W/O SCOPE: CPT | Performed by: INTERNAL MEDICINE

## 2022-08-25 PROCEDURE — G0103 PSA SCREENING: HCPCS | Performed by: INTERNAL MEDICINE

## 2022-08-25 ASSESSMENT — ENCOUNTER SYMPTOMS
WEAKNESS: 0
NAUSEA: 0
HEMATURIA: 0
HEADACHES: 0
DIARRHEA: 0
PALPITATIONS: 0
DYSURIA: 0
SORE THROAT: 0
COUGH: 0
JOINT SWELLING: 0
FREQUENCY: 0
ARTHRALGIAS: 0
ABDOMINAL PAIN: 0
SHORTNESS OF BREATH: 0
CONSTIPATION: 0
CHILLS: 0
HEMATOCHEZIA: 0
NERVOUS/ANXIOUS: 0
HEARTBURN: 0
FEVER: 0
PARESTHESIAS: 0
MYALGIAS: 0
EYE PAIN: 0
DIZZINESS: 0

## 2022-08-25 ASSESSMENT — PAIN SCALES - GENERAL: PAINLEVEL: NO PAIN (0)

## 2022-08-25 ASSESSMENT — ACTIVITIES OF DAILY LIVING (ADL): CURRENT_FUNCTION: NO ASSISTANCE NEEDED

## 2022-08-25 NOTE — PROGRESS NOTES
"SUBJECTIVE:   Chan Bishop is a 72 year old male who presents for Preventive Visit.    {Split Bill scripting  The purpose of this visit is to discuss your medical history and prevent health problems before you are sick. You may be responsible for a co-pay, coinsurance, or deductible if your visit today includes services such as checking on a sore throat, having an x-ray or lab test, or treating and evaluating a new or existing condition :300189}  Patient has been advised of split billing requirements and indicates understanding: Yes  Are you in the first 12 months of your Medicare coverage?  No    HPI  Do you feel safe in your environment? Yes    Have you ever done Advance Care Planning? (For example, a Health Directive, POLST, or a discussion with a medical provider or your loved ones about your wishes): No, advance care planning information given to patient to review.  Advanced care planning was discussed at today's visit.    {Hearing Test Done (Optional):636430}   Fall risk  Fallen 2 or more times in the past year?: No  Any fall with injury in the past year?: No  {If any of the above assessments are answered yes, consider ordering appropriate referrals (Optional):205709::\"click delete button to remove this line now\"}  Cognitive Screening   1) Repeat 3 items (Leader, Season, Table)    2) Clock draw: NORMAL  3) 3 item recall: {Say: \"What were the three words I asked you to remember?\"}Recalls 3 objects  Results: {MINICOG RESULTS:402494}    Mini-CogTM Copyright SUSHIL Rodriguez. Licensed by the author for use in [facility]; reprinted with permission ([email or web address]). All rights reserved.      Do you have sleep apnea, excessive snoring or daytime drowsiness?: no    Reviewed and updated as needed this visit by clinical staff   Tobacco  Allergies  Meds   Med Hx  Surg Hx  Fam Hx  Soc Hx          Reviewed and updated as needed this visit by Provider                   Social History     Tobacco Use     Smoking " "status: Never Smoker     Smokeless tobacco: Never Used     Tobacco comment: /70 pulse 76 yesterday per wife   Substance Use Topics     Alcohol use: Not Currently     Comment: rare     {Rooming Staff- Complete this question if Prescreen response is not shown below for today's visit. If you drink alcohol do you typically have >3 drinks per day or >7 drinks per week? (Optional):229334}    Alcohol Use 8/25/2022   Prescreen: >3 drinks/day or >7 drinks/week? No   {add AUDIT responses (Optional) (A score of 7 for adult men is an indication of hazardous drinking; a score of 8 or more is an indication of an alcohol use disorder.  A score of 7 or more for adult women is an indication of hazardous drinking or an alchohol use disorder):553172}    {Outside tests to abstract? :033614}    {additional problems to add (Optional):855752}    Current providers sharing in care for this patient include: {Rooming staff:  Please update Care Team in Rooming Activity, refresh this note and then delete this statement}  Patient Care Team:  Florentin Bill MD as PCP - General  Florentin Bill MD as Assigned PCP  Alan Granados MD as Assigned Surgical Provider    The following health maintenance items are reviewed in Epic and correct as of today:  Health Maintenance Due   Topic Date Due     ANNUAL REVIEW OF  ORDERS  Never done     HEPATITIS C SCREENING  Never done     ZOSTER IMMUNIZATION (2 of 3) 03/04/2015     AORTIC ANEURYSM SCREENING (SYSTEM ASSIGNED)  Never done     MEDICARE ANNUAL WELLNESS VISIT  08/24/2022     INFLUENZA VACCINE (1) 09/01/2022     {Chronicprobdata (optional):591802}  {Decision Support (Optional):708985}        Review of Systems  {ROS COMP (Optional):994787}    OBJECTIVE:   /84 (BP Location: Right arm, Patient Position: Sitting, Cuff Size: Adult Regular)   Pulse 57   Temp 98.3  F (36.8  C) (Oral)   Resp 18   Ht 1.772 m (5' 9.75\")   Wt 86.6 kg (191 lb)   SpO2 98%   BMI 27.60 kg/m   Estimated " "body mass index is 27.6 kg/m  as calculated from the following:    Height as of this encounter: 1.772 m (5' 9.75\").    Weight as of this encounter: 86.6 kg (191 lb).  Physical Exam  {Exam (Optional) :748811}    {Diagnostic Test Results (Optional):800964::\"Diagnostic Test Results:\",\"Labs reviewed in Epic\"}    ASSESSMENT / PLAN:   {Diag Picklist:252752}    {Patient advised of split billing (Optional):583718}    COUNSELING:  {Medicare Counselin}    Estimated body mass index is 27.6 kg/m  as calculated from the following:    Height as of this encounter: 1.772 m (5' 9.75\").    Weight as of this encounter: 86.6 kg (191 lb).    {Weight Management Plan (ACO) Complete if BMI is abnormal-  Ages 18-64  BMI >24.9.  Age 65+ with BMI <23 or >30 (Optional):959250}    He reports that he has never smoked. He has never used smokeless tobacco.      Appropriate preventive services were discussed with this patient, including applicable screening as appropriate for cardiovascular disease, diabetes, osteopenia/osteoporosis, and glaucoma.  As appropriate for age/gender, discussed screening for colorectal cancer, prostate cancer, breast cancer, and cervical cancer. Checklist reviewing preventive services available has been given to the patient.    Reviewed patients plan of care and provided an AVS. The {CarePlan:392776} for Chan meets the Care Plan requirement. This Care Plan has been established and reviewed with the {PATIENT, FAMILY MEMBER, CAREGIVER:707795}.    Counseling Resources:  ATP IV Guidelines  Pooled Cohorts Equation Calculator  Breast Cancer Risk Calculator  Breast Cancer: Medication to Reduce Risk  FRAX Risk Assessment  ICSI Preventive Guidelines  Dietary Guidelines for Americans,   [employer]'s MyPlate  ASA Prophylaxis  Lung CA Screening    Florentin Bill MD  [facility]    Identified Health Risks:  "

## 2022-08-25 NOTE — PROGRESS NOTES
Annual Wellness Visit:  Chan Bishop  is a 72 year old male  who presents for an annual wellness visit.  Annual wellness visit and physical examination and screen for prostate cancer accomplished.  In addition provider or physician and patient sharing was accomplished as well.    Recent L5-S1 epidural spinal injections helpful but these were multiple.    Recent treatment for ureteral stone Dr. Allen.  Fluid hydration recommended.  Dr. Allen at [facility] is consulting urologist for nephrolithiasis.    Myocarditis with COVID illness 2020.    Appreciates [facility] consultation.    Advance care planning done.    Falls risk assessment also accomplished.    Cognitive assessment was completed and the current provider this examiner and patient sharing was also completed.  Assessment/Plan:  Annual wellness visit and screen for prostate cancer.  Physical examination was done.    Subjective:   Medical History:    Non-smoker no excess alcohol.  Allergies none listed.  COVID illness with myocarditis in 2020    Recent ureteral stone followed by Dr. Allen at [facility].  Fluid hydration reemphasized.    L5-S1 epidural spinal injections helpful right side.  Multiple injections necessitated.    Recent full examination at the [facility] patient appreciated there thoroughness and efficiency.  Past Medical History:   Diagnosis Date     Arthritis Osteoarthritis 2020     Congestive heart failure (H) 2021     Heart disease december 2019     HTN (hypertension)      Hyperlipidemia      Prostate CA (H)      Current Outpatient Medications   Medication     amLODIPine (NORVASC) 5 MG tablet     aspirin 81 mg chewable tablet     lisinopril (ZESTRIL) 20 MG tablet     lovastatin (MEVACOR) 10 MG tablet     magnesium 30 mg tablet     meloxicam (MOBIC) 15 MG tablet     metoprolol succinate ER (TOPROL-XL) 50 MG 24 hr tablet     No current facility-administered medications for this visit.     Immunization History    Administered Date(s) Administered     COVID-19,PF,Pfizer (12+ Yrs) 01/23/2021, 02/12/2021, 09/24/2021     COVID-19,PF,Pfizer 12+ Yrs (2022 and After) 03/31/2022     DT (PEDS <7y) 06/02/2004     FLU 6-35 months 08/28/2010     Flu, Unspecified 09/15/2020, 09/21/2021     Flu-nasal, Unspecified 09/17/2018     HepA, Unspecified 01/10/2008     HepA-Adult 01/10/2008, 09/02/2009     HepB-Adult 08/11/2015, 09/11/2015, 02/23/2016     Influenza (H1N1) 12/30/2009     Influenza (High Dose) 3 valent vaccine 09/11/2015, 10/14/2016, 09/30/2017     Influenza (IIV3) PF 09/02/2009     Influenza Quad, Recombinant, pf(RIV4) (Flublok) 10/17/2019     Influenza Vaccine, 6+MO IM (QUADRIVALENT W/PRESERVATIVES) 09/05/2013     Influenza, Quad, High Dose, Pf, 65yr+ (Fluzone HD) 09/15/2020, 09/21/2021     Japanese Encephalitis IM 08/16/2010, 09/09/2010, 09/05/2013     Pneumo Conj 13-V (2010&after) 07/20/2015     Pneumococcal 23 valent 07/21/2016     Td (Adult), Adsorbed 06/02/2004     Td,adult,historic,unspecified 06/02/2004, 03/02/2009     Tdap (Adacel,Boostrix) 03/02/2009, 06/29/2022     Typhoid Oral 01/10/2008, 09/05/2013     Zoster vaccine, live 01/07/2015       Surgical History:  Past Surgical History:   Procedure Laterality Date     BIOPSY  1996     COLONOSCOPY  2017     CV CORONARY ANGIOGRAM N/A 12/03/2019    Procedure: Coronary Angiogram;  Surgeon: Sheryl Mccormick MD;  Location: [facility] Cath Lab;  Service: Cardiology     CV LEFT HEART CATHETERIZATION WITHOUT LEFT VENTRICULOGRAM Left 12/03/2019    Procedure: Left Heart Catheterization Without Left Ventriculogram;  Surgeon: Sheryl Mccormick MD;  Location: [facility] Cath Lab;  Service: Cardiology     HIP SURGERY       ORTHOPEDIC SURGERY  Hip resurfacing 2010     PROSTATECTOMY  01/01/1996        Family History:  2 children well wife well wife is a retired registered nurse works for [employer].  She has had a history of coronary vasospasm and myocardial infarction based on that Zoila  "angina.  The patient's 1 sister  of cancer.    Mother  ovarian cancer age 45 Father  83 after a fall dementia Alzheimer's.    Social History:  Retired now enjoys active lifestyle biology degree from [employer] in [place].    Health Maintenances:  Reviewed the importance of periodic colonoscopy vaccines for COVID-19 prevention.  Patient asked about antiviral therapy but stipulations are that the test has to be positive and patient has symptoms for fewer than 5 days anticipates travel wanted prophylactic P not given.    Objective:  /84 (BP Location: Right arm, Patient Position: Sitting, Cuff Size: Adult Regular)   Pulse 57   Temp 98.3  F (36.8  C) (Oral)   Resp 18   Ht 1.772 m (5' 9.75\")   Wt 86.6 kg (191 lb)   SpO2 98%   BMI 27.60 kg/m    Skin negative dark complected appears younger than stated age wears glasses head normocephalic eyes ears nose throat grossly normal back straight no severe spine tenderness lungs clear to auscultation and percussion neck veins are nondistended there was no thyromegaly or carotid bruits heart tones normal without murmur rub or gallop.  Abdomen benign without organomegaly masses or tenderness bowel sounds present but hypoactive there was no liver spleen tip palpable genital examination was negative testicular exam was normal bilaterally scrotal contents normal no groin hernias rectal showed the prostate to be surgically absent for prostatectomy for prostate cancer by Dr. EUBANKS [facility] now retired there is good no sign of recurrence of prostate cancer thankfully.    Past medical history family medical history review of systems all reviewed along with social and occupational history at health maintenance.  Thorough examination was done annual wellness visit completed satisfactorily.    Florentin Bill MD    Internal Medicine  Answers for HPI/ROS submitted by the patient on 2022  In general, how would you rate your overall physical " "health?: good  Frequency of exercise:: 6-7 days/week  Do you usually eat at least 4 servings of fruit and vegetables a day, include whole grains & fiber, and avoid regularly eating high fat or \"junk\" foods? : No  Taking medications regularly:: Yes  Medication side effects:: None  Activities of Daily Living: no assistance needed  Home safety: no safety concerns identified  Hearing Impairment:: no hearing concerns  In the past 6 months, have you been bothered by leaking of urine?: No  abdominal pain: No  Blood in stool: No  Blood in urine: No  chest pain: No  chills: No  congestion: No  constipation: No  cough: No  diarrhea: No  dizziness: No  ear pain: No  eye pain: No  nervous/anxious: No  fever: No  frequency: No  genital sores: No  headaches: No  hearing loss: No  heartburn: No  arthralgias: No  joint swelling: No  peripheral edema: No  mood changes: No  myalgias: No  nausea: No  dysuria: No  palpitations: No  Skin sensation changes: No  sore throat: No  urgency: No  rash: No  shortness of breath: No  visual disturbance: No  weakness: No  impotence: No  penile discharge: No  In general, how would you rate your overall mental or emotional health?: excellent  Additional concerns today:: No  Duration of exercise:: Greater than 60 minutes      "

## 2022-08-26 NOTE — PROGRESS NOTES
"HOME SLEEP STUDY INTERPRETATION        Patient: Chan Bishop  MRN: [id number]  YOB: 1950  Study Date: 5/5/2022  PCP/Referring Provider: Florentin Bill;   Ordering Provider: Alan Granados MD, MD         Indications for Home Study: Chan Bishop is a 72 year old male with a history of snoring, apneas, hypoxemia, h/o myocarditis, HTN who presents with symptoms suggestive of obstructive sleep apnea.    Estimated body mass index is 27.6 kg/m  as calculated from the following:    Height as of 8/25/22: 1.772 m (5' 9.75\").    Weight as of 8/25/22: 86.6 kg (191 lb).  Total score - [place]: 6 (5/3/2022  4:28 PM)  Total Score: 5 (5/3/2022  4:30 PM)        Data: A full night home sleep study was performed recording the standard physiologic parameters including body position, movement, sound, nasal pressure, thermal oral airflow, chest and abdominal movements with respiratory inductance plethysmography, and oxygen saturation by pulse oximetry. Pulse rate was estimated by oximetry recording. This study was considered adequate based on > 4 hours of quality oximetry and respiratory recording. As specified by the AASM Manual for the Scoring of Sleep and Associated events, version 2.3, Rule VIII.D 1B, 4% oxygen desaturation scoring for hypopneas is used as a standard of care on all home sleep apnea testing.        Analysis Time:  440 minutes        Respiration:   Sleep Associated Hypoxemia: sustained hypoxemia was not present. Baseline oxygen saturation was 94%.  Time with saturation less than or equal to 88% was 0.2 minutes. The lowest oxygen saturation was 87%.   Snoring: Snoring was present.  Respiratory events: The home study revealed a presence of 32 obstructive apneas and 1 mixed and central apneas. There were 54 hypopneas resulting in a combined apnea/hypopnea index [AHI] of 11.9 events per hour.  AHI was 24.3 per hour supine, 0 per hour prone, 4.9 per hour on left side, and 9.5 per hour on right " side.   Pattern: Excluding events noted above, respiratory rate and pattern was Normal.      Position: Percent of time spent: supine - 28.6%, prone - 0%, on left - 33.2%, on right - 34.4%.      Heart Rate: By pulse oximetry tachycardia was noted.       Assessment:     Mild obstructive sleep apnea.    Sleep associated hypoxemia was not present.    Recommendations:    Consider auto-CPAP at 5-12 cmH2O or oral appliance therapy.    Suggest optimizing sleep hygiene and avoiding sleep deprivation.          Diagnosis Code(s): Obstructive Sleep Apnea G47.33    Alan Granados MD,  August 26, 2022   Diplomate, American Board of Otolaryngology, Sleep Medicine

## 2022-09-01 ENCOUNTER — TRANSFERRED RECORDS (OUTPATIENT)
Dept: HEALTH INFORMATION MANAGEMENT | Facility: CLINIC | Age: 72
End: 2022-09-01

## 2022-09-25 ENCOUNTER — HEALTH MAINTENANCE LETTER (OUTPATIENT)
Age: 72
End: 2022-09-25

## 2022-09-27 ENCOUNTER — MYC MEDICAL ADVICE (OUTPATIENT)
Dept: INTERNAL MEDICINE | Facility: CLINIC | Age: 72
End: 2022-09-27

## 2022-09-27 ENCOUNTER — TRANSFERRED RECORDS (OUTPATIENT)
Dept: HEALTH INFORMATION MANAGEMENT | Facility: CLINIC | Age: 72
End: 2022-09-27

## 2022-10-02 DIAGNOSIS — E88.810 METABOLIC SYNDROME: ICD-10-CM

## 2022-10-02 RX ORDER — LOVASTATIN 10 MG
TABLET ORAL
Qty: 90 TABLET | Refills: 2 | Status: SHIPPED | OUTPATIENT
Start: 2022-10-02 | End: 2023-07-05

## 2022-10-03 NOTE — TELEPHONE ENCOUNTER
"Last Written Prescription Date:  10/3/21  Last Fill Quantity: 90,  # refills: 3   Last office visit provider:  8/25/22     Requested Prescriptions   Pending Prescriptions Disp Refills     lovastatin (MEVACOR) 10 MG tablet [Pharmacy Med Name: Lovastatin Oral Tablet 10 MG] 90 tablet 0     Sig: Take 1 tablet (10 mg total) by mouth at bedtime.       Statins Protocol Passed - 10/2/2022  9:05 AM        Passed - LDL on file in past 12 months     Recent Labs   Lab Test 08/25/22  0914   LDL 62             Passed - No abnormal creatine kinase in past 12 months     No lab results found.             Passed - Recent (12 mo) or future (30 days) visit within the authorizing provider's specialty     Patient has had an office visit with the authorizing provider or a provider within the authorizing providers department within the previous 12 mos or has a future within next 30 days. See \"Patient Info\" tab in inbasket, or \"Choose Columns\" in Meds & Orders section of the refill encounter.              Passed - Medication is active on med list        Passed - Patient is age 18 or older             Brigida Lee, RN 10/02/22 8:38 PM  "

## 2022-10-11 ENCOUNTER — TRANSFERRED RECORDS (OUTPATIENT)
Dept: HEALTH INFORMATION MANAGEMENT | Facility: CLINIC | Age: 72
End: 2022-10-11

## 2022-10-24 ENCOUNTER — VIRTUAL VISIT (OUTPATIENT)
Dept: INTERNAL MEDICINE | Facility: CLINIC | Age: 72
End: 2022-10-24
Payer: MEDICARE

## 2022-10-24 DIAGNOSIS — I67.7 CEREBRAL VASCULITIS: Primary | ICD-10-CM

## 2022-10-24 PROCEDURE — 99441 PR PHYSICIAN TELEPHONE EVALUATION 5-10 MIN: CPT | Mod: 95 | Performed by: INTERNAL MEDICINE

## 2022-10-24 NOTE — PROGRESS NOTES
Chan Bishop is a 72 year oldwho is being evaluated via a billable telephone visit.       What phone number would you like to be contacted at? [phone]      Assessment & Plan  Cerebral vasculitis?.  Needs neurology consult MRI scan reviewed as ordered by ENT Dr. MCMANUS at [facility] [phone].     11 minutes spent on the date of the encounter doing chart review, patient visit and documentation  I spoke with the patient directly on the phone for 5 minutes his wife is a retired nurse also was present.       Subjective   Balance disorder with tinnitus.  Seen by ear nose and throat specialist.  Eustachian tube dysfunction.  Resultant MRI scan showed suggestion of infection versus cerebral vasculitis.  Same side.  Neurology consult needed.  Discussed     Review of Systems   No blood in stool or urine med list reviewed reconciled.  Denies chest pain shortness of breath.       Florentin Bill MD

## 2022-10-25 ENCOUNTER — OFFICE VISIT (OUTPATIENT)
Dept: NEUROLOGY | Facility: CLINIC | Age: 72
End: 2022-10-25
Attending: INTERNAL MEDICINE
Payer: MEDICARE

## 2022-10-25 VITALS
BODY MASS INDEX: 28.5 KG/M2 | HEART RATE: 59 BPM | DIASTOLIC BLOOD PRESSURE: 78 MMHG | SYSTOLIC BLOOD PRESSURE: 122 MMHG | WEIGHT: 197.2 LBS

## 2022-10-25 DIAGNOSIS — R90.82 WHITE MATTER ABNORMALITY ON MRI OF BRAIN: Primary | ICD-10-CM

## 2022-10-25 DIAGNOSIS — I67.89 OTHER CEREBROVASCULAR DISEASE: ICD-10-CM

## 2022-10-25 DIAGNOSIS — I67.7 CEREBRAL VASCULITIS: ICD-10-CM

## 2022-10-25 DIAGNOSIS — I67.9 CEREBROVASCULAR SMALL VESSEL DISEASE: ICD-10-CM

## 2022-10-25 PROCEDURE — 99205 OFFICE O/P NEW HI 60 MIN: CPT | Performed by: PSYCHIATRY & NEUROLOGY

## 2022-10-25 NOTE — PATIENT INSTRUCTIONS
Plan:  -- Labs for inflammation.  -- We will take a closer look at your vessels with an MRA.  I will notify you of the test results and let you know if any additional work-up is recommended.  We will plan to follow-up as needed pending the results.

## 2022-10-25 NOTE — PROGRESS NOTES
INITIAL NEUROLOGY CONSULTATION    DATE OF VISIT: 10/25/2022  MRN: [id number]  PATIENT NAME: Chan Bishop  YOB: 1950    REFERRING PROVIDER: Florentin Bill    Chief Complaint   Patient presents with     Neurologic Problem     Balance Issues  MRI abnormalities       SUBJECTIVE:                                                      HPI:   Chan Bishop is a 72 year old male whom I have been asked by Dr. Bill to see in consultation for abnormal brain MRI, balance problems.  Per chart review, the patient had a virtual visit with his primary physician yesterday with regards to these concerns.  Brain MRI had been ordered by outside ENT, because along with balance problems the patient has also been experiencing tinnitus.    Alanna has additional history of osteoarthritis, CHF, hypertension, hyperlipidemia, urolithiasis and prostate cancer.    Alanna says that when he did his hearing test he had a little trouble the balance part of the testing.  He says he feels like his ears are congested and he feels that this is related to some of his balance issues but he reiterates that these are minor.  He is not having any falls.  He walks 3-5 miles per day.    He says in 2019 he thought he was having a heart attack so he sought medical care and in the end it was determined that his heart had been attacked by a viral infection.  Discharge summary from that time indicates he did have an NSTEMI as well.. He says he was sick for about 4-5 months and had some problems back pain which she was told was related to inflammation.    He has had some tingling down into the legs related to the issues with his lumbar spine.  This along with his back pain improved quite a bit with multiple cortisone injections.  Alanna has never had spinal surgery.  No rashes.  No headache or visual changes.  No pain with chewing or talking.    Past Medical History:   Diagnosis Date     Arthritis Osteoarthritis 2020     Congestive heart failure (H)  2021     Heart disease december 2019     HTN (hypertension)      Hyperlipidemia      Prostate CA (H)      Past Surgical History:   Procedure Laterality Date     BIOPSY  1996     COLONOSCOPY  2017     CV CORONARY ANGIOGRAM N/A 12/03/2019    Procedure: Coronary Angiogram;  Surgeon: Sheryl Mccormick MD;  Location: [facility];  Service: Cardiology     CV LEFT HEART CATHETERIZATION WITHOUT LEFT VENTRICULOGRAM Left 12/03/2019    Procedure: Left Heart Catheterization Without Left Ventriculogram;  Surgeon: Sheryl Mccormick MD;  Location: [facility];  Service: Cardiology     HIP SURGERY       ORTHOPEDIC SURGERY  Hip resurfacing 2010     PROSTATECTOMY  01/01/1996       amLODIPine (NORVASC) 5 MG tablet, Take 1 tablet (5 mg total) by mouth daily.  aspirin 81 mg chewable tablet, [ASPIRIN 81 MG CHEWABLE TABLET] Chew 1 tablet (81 mg total) daily.  lisinopril (ZESTRIL) 20 MG tablet, Take 1 tablet (20 mg total) by mouth daily.  lovastatin (MEVACOR) 10 MG tablet, Take 1 tablet (10 mg total) by mouth at bedtime.  magnesium 30 mg tablet, Take 400 mg by mouth daily  metoprolol succinate ER (TOPROL-XL) 50 MG 24 hr tablet, Take 1 tablet (50 mg total) by mouth daily.  meloxicam (MOBIC) 15 MG tablet, Take 1 tablet (15 mg) by mouth daily (Patient not taking: Reported on 10/25/2022)    No current facility-administered medications on file prior to visit.    Allergies   Allergen Reactions     Ciprofloxacin      Dizziness and leg numbness     Metronidazole      Dizziness and leg numbness     Sulfa (Sulfonamide Antibiotics) [Sulfa Drugs] Rash        Problem (# of Occurrences) Relation (Name,Age of Onset)    Diabetes (1) Other (Donal Tamayo): Aunt    Hypertension (1) Father (Krystian Bishop)    Cerebrovascular Disease (1) Paternal Grandfather (Rusty Dario)    Prostate Cancer (1) Brother (Florentin Bishop)    Other Cancer (2) Mother (Darlyn Dario), Sister (Yuliya Jamie)    Ovarian Cancer (1) Mother (Darlynchristophe Bishop)        Social History      Tobacco Use     Smoking status: Never     Smokeless tobacco: Never     Tobacco comments:     /70 pulse 76 yesterday per wife   Vaping Use     Vaping Use: Never used   Substance Use Topics     Alcohol use: Not Currently     Comment: rare     Drug use: No       REVIEW OF SYSTEMS:                                                      10-point review of systems is negative except as mentioned above in HPI.     EXAM:                                                      Physical Exam:   Vitals: /78   Pulse 59   Wt 89.4 kg (197 lb 3.2 oz)   BMI 28.50 kg/m    BMI= Body mass index is 28.5 kg/m .  GENERAL: NAD.  HEENT: NC/AT.   CV: RRR. S1, S2.   NECK: No bruits.  PULM: Non-labored breathing.   Neurologic:  MENTAL STATUS: Alert, attentive. Speech is fluent. Normal comprehension. Normal concentration. Adequate fund of knowledge.   CRANIAL NERVES: Discs flat. Visual fields intact to confrontation. Pupils equally, round and reactive to light. Facial sensation and movement normal. EOM full. Hearing intact to conversation. Trapezius strength intact. Palate moves symmetrically. Tongue midline.  MOTOR: 5/5 in proximal and distal muscle groups of upper and lower extremities. Tone and bulk normal.   DTRs: Intact and symmetric in biceps, BR, patellae.  Babinski down-going bilaterally.   SENSATION: Normal light touch and pinprick in all extremities. Intact proprioception at great toes. Vibration: Slightly decreased at both ankles.   COORDINATION: Normal finger nose finger. Finger tapping normal. Knee heel shin normal.  STATION AND GAIT: Slight sway with Romberg. Good postural reflexes. Casual gait and tandem normal.  Left hand-dominant.    Relevant Data:  Brain MRI reviewed by me through [facility] portal.    ASSESSMENT and PLAN:                                                      Assessment:     ICD-10-CM    1. White matter abnormality on MRI of brain  R90.82 Anti Nuclear [place] IgG by IFA with Reflex     CRP inflammation      ESR: Erythrocyte sedimentation rate     ANCA IgG by IFA with Reflex to Titer     Complement C3     Complement C4     Lyme Disease Total Abs Bld with Reflex to Confirm CLIA     SSA Ro ADAMARIS Antibody IgG     SSB La ADAMARIS Antibody IgG     Cryoglobulin identification      2. Cerebral vasculitis  I67.7 Adult Neurology  Referral     Anti Nuclear [place] IgG by IFA with Reflex     CRP inflammation     ESR: Erythrocyte sedimentation rate     ANCA IgG by IFA with Reflex to Titer     Complement C3     Complement C4     Lyme Disease Total Abs Bld with Reflex to Confirm CLIA      3. Cerebrovascular small vessel disease  I67.9 MRA Brain ([place]) wo & w Contrast      4. Other cerebrovascular disease  I67.89 MRA Brain ([place]) wo & w Contrast           Mr. Bishop is a pleasant 72-year-old man here for evaluation regarding some white matter changes on his brain MRI.  The radiologist reading the study felt that these were somewhat atypical for small vessel disease though he does have risk factors for this.  I am not entirely convinced that the findings are infectious or inflammatory but to be prudent we will take a closer look at the vessels and check some basic inflammatory labs.  Unless he were to develop more symptoms I am not inclined to do CSF studies or refer him for conventional angiography, but this could be completed in the future if the suspicion for vasculitis remains.  The balance issue is not causing any functional impairment. We talked about physical therapy as an option if this were to change.  We will plan to follow-up as needed, pending test results.  Alanna understands and agrees with the plan.    Plan:  -- Labs for inflammation.  -- We will take a closer look at your vessels with an MRA.  I will notify you of the test results and let you know if any additional work-up is recommended.  We will plan to follow-up as needed pending the results.    Total Time: 60 minutes were spent with the patient  and in chart review/documentation (as described above in Assessment and Plan) /coordinating the care on date of service.    Natalia Ospina MD  Neurology    CC: MD Tarun Stafford software used in the dictation of this note.

## 2022-10-25 NOTE — LETTER
10/25/2022         RE: Chan Bishop  [address]  [place] [facility] [address]        Dear Colleague,    Thank you for referring your patient, Chan Bishop, to the [facility] [place]. Please see a copy of my visit note below.    INITIAL NEUROLOGY CONSULTATION    DATE OF VISIT: 10/25/2022  MRN: [id number]  PATIENT NAME: Chan Bishop  YOB: 1950    REFERRING PROVIDER: Florentin Bill    Chief Complaint   Patient presents with     Neurologic Problem     Balance Issues  MRI abnormalities       SUBJECTIVE:                                                      HPI:   Chan Bishop is a 72 year old male whom I have been asked by Dr. Bill to see in consultation for abnormal brain MRI, balance problems.  Per chart review, the patient had a virtual visit with his primary physician yesterday with regards to these concerns.  Brain MRI had been ordered by outside ENT, because along with balance problems the patient has also been experiencing tinnitus.    Alanna has additional history of osteoarthritis, CHF, hypertension, hyperlipidemia, urolithiasis and prostate cancer.    Alanna says that when he did his hearing test he had a little trouble the balance part of the testing.  He says he feels like his ears are congested and he feels that this is related to some of his balance issues but he reiterates that these are minor.  He is not having any falls.  He walks 3-5 miles per day.    He says in 2019 he thought he was having a heart attack so he sought medical care and in the end it was determined that his heart had been attacked by a viral infection.  Discharge summary from that time indicates he did have an NSTEMI as well.. He says he was sick for about 4-5 months and had some problems back pain which she was told was related to inflammation.    He has had some tingling down into the legs related to the issues with his lumbar spine.  This along with his back pain improved quite a  bit with multiple cortisone injections.  Pat has never had spinal surgery.  No rashes.  No headache or visual changes.  No pain with chewing or talking.    Past Medical History:   Diagnosis Date     Arthritis Osteoarthritis 2020     Congestive heart failure (H) 2021     Heart disease december 2019     HTN (hypertension)      Hyperlipidemia      Prostate CA (H)      Past Surgical History:   Procedure Laterality Date     BIOPSY  1996     COLONOSCOPY  2017     CV CORONARY ANGIOGRAM N/A 12/03/2019    Procedure: Coronary Angiogram;  Surgeon: Sheryl Mccormick MD;  Location: [facility];  Service: Cardiology     CV LEFT HEART CATHETERIZATION WITHOUT LEFT VENTRICULOGRAM Left 12/03/2019    Procedure: Left Heart Catheterization Without Left Ventriculogram;  Surgeon: Sheryl Mccormick MD;  Location: [facility];  Service: Cardiology     HIP SURGERY       ORTHOPEDIC SURGERY  Hip resurfacing 2010     PROSTATECTOMY  01/01/1996       amLODIPine (NORVASC) 5 MG tablet, Take 1 tablet (5 mg total) by mouth daily.  aspirin 81 mg chewable tablet, [ASPIRIN 81 MG CHEWABLE TABLET] Chew 1 tablet (81 mg total) daily.  lisinopril (ZESTRIL) 20 MG tablet, Take 1 tablet (20 mg total) by mouth daily.  lovastatin (MEVACOR) 10 MG tablet, Take 1 tablet (10 mg total) by mouth at bedtime.  magnesium 30 mg tablet, Take 400 mg by mouth daily  metoprolol succinate ER (TOPROL-XL) 50 MG 24 hr tablet, Take 1 tablet (50 mg total) by mouth daily.  meloxicam (MOBIC) 15 MG tablet, Take 1 tablet (15 mg) by mouth daily (Patient not taking: Reported on 10/25/2022)    No current facility-administered medications on file prior to visit.    Allergies   Allergen Reactions     Ciprofloxacin      Dizziness and leg numbness     Metronidazole      Dizziness and leg numbness     Sulfa (Sulfonamide Antibiotics) [Sulfa Drugs] Rash        Problem (# of Occurrences) Relation (Name,Age of Onset)    Diabetes (1) Other (Donal Tamayo): Aunt    Hypertension (1)  Father (Krystian Bishop)    Cerebrovascular Disease (1) Paternal Grandfather (Rusty Bishop)    Prostate Cancer (1) Brother (Florentin Bishop)    Other Cancer (2) Mother (Darlyn Bishop), Sister (Yuliya Ayala)    Ovarian Cancer (1) Mother (Darlyn Bishop)        Social History     Tobacco Use     Smoking status: Never     Smokeless tobacco: Never     Tobacco comments:     /70 pulse 76 yesterday per wife   Vaping Use     Vaping Use: Never used   Substance Use Topics     Alcohol use: Not Currently     Comment: rare     Drug use: No       REVIEW OF SYSTEMS:                                                      10-point review of systems is negative except as mentioned above in HPI.     EXAM:                                                      Physical Exam:   Vitals: /78   Pulse 59   Wt 89.4 kg (197 lb 3.2 oz)   BMI 28.50 kg/m    BMI= Body mass index is 28.5 kg/m .  GENERAL: NAD.  HEENT: NC/AT.   CV: RRR. S1, S2.   NECK: No bruits.  PULM: Non-labored breathing.   Neurologic:  MENTAL STATUS: Alert, attentive. Speech is fluent. Normal comprehension. Normal concentration. Adequate fund of knowledge.   CRANIAL NERVES: Discs flat. Visual fields intact to confrontation. Pupils equally, round and reactive to light. Facial sensation and movement normal. EOM full. Hearing intact to conversation. Trapezius strength intact. Palate moves symmetrically. Tongue midline.  MOTOR: 5/5 in proximal and distal muscle groups of upper and lower extremities. Tone and bulk normal.   DTRs: Intact and symmetric in biceps, BR, patellae.  Babinski down-going bilaterally.   SENSATION: Normal light touch and pinprick in all extremities. Intact proprioception at great toes. Vibration: Slightly decreased at both ankles.   COORDINATION: Normal finger nose finger. Finger tapping normal. Knee heel shin normal.  STATION AND GAIT: Slight sway with Romberg. Good postural reflexes. Casual gait and tandem normal.  Left hand-dominant.    Relevant Data:  Brain  MRI reviewed by me through [facility] portal.    ASSESSMENT and PLAN:                                                      Assessment:     ICD-10-CM    1. White matter abnormality on MRI of brain  R90.82 Anti Nuclear [place] IgG by IFA with Reflex     CRP inflammation     ESR: Erythrocyte sedimentation rate     ANCA IgG by IFA with Reflex to Titer     Complement C3     Complement C4     Lyme Disease Total Abs Bld with Reflex to Confirm CLIA     SSA Ro ADAMARIS Antibody IgG     SSB La ADAMARIS Antibody IgG     Cryoglobulin identification      2. Cerebral vasculitis  I67.7 Adult Neurology  Referral     Anti Nuclear [place] IgG by IFA with Reflex     CRP inflammation     ESR: Erythrocyte sedimentation rate     ANCA IgG by IFA with Reflex to Titer     Complement C3     Complement C4     Lyme Disease Total Abs Bld with Reflex to Confirm CLIA      3. Cerebrovascular small vessel disease  I67.9 MRA Brain ([place] [facility] [place]) wo & w Contrast      4. Other cerebrovascular disease  I67.89 MRA Brain ([place]) wo & w Contrast           Mr. Bishop is a pleasant 72-year-old man here for evaluation regarding some white matter changes on his brain MRI.  The radiologist reading the study felt that these were somewhat atypical for small vessel disease though he does have risk factors for this.  I am not entirely convinced that the findings are infectious or inflammatory but to be prudent we will take a closer look at the vessels and check some basic inflammatory labs.  Unless he were to develop more symptoms I am not inclined to do CSF studies or refer him for conventional angiography, but this could be completed in the future if the suspicion for vasculitis remains.  The balance issue is not causing any functional impairment. We talked about physical therapy as an option if this were to change.  We will plan to follow-up as needed, pending test results.  Alanna understands and agrees with the plan.    Plan:  -- Labs for inflammation.  -- We  will take a closer look at your vessels with an MRA.  I will notify you of the test results and let you know if any additional work-up is recommended.  We will plan to follow-up as needed pending the results.    Total Time: 60 minutes were spent with the patient and in chart review/documentation (as described above in Assessment and Plan) /coordinating the care on date of service.    Natalia Ospina MD  Neurology    CC: Florentin Bill MD    Dragon software used in the dictation of this note.        Again, thank you for allowing me to participate in the care of your patient.        Sincerely,        Natalia Ospina MD

## 2022-10-26 ENCOUNTER — LAB (OUTPATIENT)
Dept: LAB | Facility: HOSPITAL | Age: 72
End: 2022-10-26
Payer: MEDICARE

## 2022-10-26 DIAGNOSIS — I67.7 CEREBRAL VASCULITIS: ICD-10-CM

## 2022-10-26 DIAGNOSIS — R90.82 WHITE MATTER ABNORMALITY ON MRI OF BRAIN: ICD-10-CM

## 2022-10-26 LAB
CRP SERPL-MCNC: 26.1 MG/L
ERYTHROCYTE [SEDIMENTATION RATE] IN BLOOD BY WESTERGREN METHOD: 2 MM/HR (ref 0–15)

## 2022-10-26 PROCEDURE — 86618 LYME DISEASE ANTIBODY: CPT

## 2022-10-26 PROCEDURE — 82595 ASSAY OF CRYOGLOBULIN: CPT

## 2022-10-26 PROCEDURE — 85652 RBC SED RATE AUTOMATED: CPT

## 2022-10-26 PROCEDURE — 86235 NUCLEAR ANTIGEN ANTIBODY: CPT

## 2022-10-26 PROCEDURE — 86038 ANTINUCLEAR ANTIBODIES: CPT

## 2022-10-26 PROCEDURE — 86334 IMMUNOFIX E-PHORESIS SERUM: CPT

## 2022-10-26 PROCEDURE — 86160 COMPLEMENT ANTIGEN: CPT

## 2022-10-26 PROCEDURE — 36415 COLL VENOUS BLD VENIPUNCTURE: CPT

## 2022-10-26 PROCEDURE — 86036 ANCA SCREEN EACH ANTIBODY: CPT

## 2022-10-26 PROCEDURE — 86140 C-REACTIVE PROTEIN: CPT

## 2022-10-27 LAB
ANA SER QL IF: NEGATIVE
ANCA AB PATTERN SER IF-IMP: NORMAL
B BURGDOR IGG+IGM SER QL: 0.13
C-ANCA TITR SER IF: NORMAL {TITER}
C3 SERPL-MCNC: 123 MG/DL (ref 81–157)
C4 SERPL-MCNC: 29 MG/DL (ref 13–39)
ENA SS-A AB SER IA-ACNC: 0.5 U/ML
ENA SS-A AB SER IA-ACNC: NEGATIVE
ENA SS-B IGG SER IA-ACNC: <0.6 U/ML
ENA SS-B IGG SER IA-ACNC: NEGATIVE

## 2022-10-31 ENCOUNTER — TELEPHONE (OUTPATIENT)
Dept: NEUROLOGY | Facility: CLINIC | Age: 72
End: 2022-10-31

## 2022-10-31 NOTE — TELEPHONE ENCOUNTER
Discussed with Dr. Ospina, d/t concern for vasculitis she would like MRA to be WITH contrast.     RN returned call to [facility] and relayed this message, they verbalize understanding.     Vadim Mooney RN, BSN  [facility] Neurology

## 2022-10-31 NOTE — TELEPHONE ENCOUNTER
Health Call Center    Phone Message    May a detailed message be left on voicemail: yes     Reason for Call: Other: Gene from [facility] is calling to clarify MRI order that has been recieved. Patient has an appt tomorrow 11/1/22. Please contact [facility] to discuss at [phone].    Action Taken: Message routed to:  Other: [facility]    Travel Screening: Not Applicable

## 2022-11-01 ENCOUNTER — TRANSFERRED RECORDS (OUTPATIENT)
Dept: HEALTH INFORMATION MANAGEMENT | Facility: CLINIC | Age: 72
End: 2022-11-01

## 2022-11-01 LAB — CRYOGLOB SER QL: ABNORMAL

## 2022-11-02 ENCOUNTER — TELEPHONE (OUTPATIENT)
Dept: NEUROLOGY | Facility: CLINIC | Age: 72
End: 2022-11-02

## 2022-11-02 LAB
ALBUMIN SERPL ELPH-MCNC: NEGATIVE G/DL
CRYOGLOB IGA & IGG & IGM SER-IMP: NORMAL
CRYOGLOB TYP SER IFE: NEGATIVE

## 2022-11-02 PROCEDURE — 86334 IMMUNOFIX E-PHORESIS SERUM: CPT | Mod: 26 | Performed by: PATHOLOGY

## 2022-11-02 NOTE — TELEPHONE ENCOUNTER
LVM that we will obtain the images and report from [facility].   Will send result to Provider to look at it. Once we get the result, will follow-up with patient.

## 2022-11-02 NOTE — TELEPHONE ENCOUNTER
[facility] Call Center    Phone Message    May a detailed message be left on voicemail: yes     Reason for Call:     Patient called to let  know that all his orders are done and are available on the [facility] portal.  Patient wondering how he will receive results, does he need an appointment or will provider call him? Please advise.     Action Taken: Other: [facility]    Travel Screening: Not Applicable

## 2022-11-05 DIAGNOSIS — I10 ESSENTIAL HYPERTENSION: ICD-10-CM

## 2022-11-07 ENCOUNTER — MYC MEDICAL ADVICE (OUTPATIENT)
Dept: NEUROLOGY | Facility: CLINIC | Age: 72
End: 2022-11-07

## 2022-11-07 ENCOUNTER — TRANSFERRED RECORDS (OUTPATIENT)
Dept: HEALTH INFORMATION MANAGEMENT | Facility: CLINIC | Age: 72
End: 2022-11-07

## 2022-11-07 DIAGNOSIS — R79.82 ELEVATED C-REACTIVE PROTEIN (CRP): Primary | ICD-10-CM

## 2022-11-07 RX ORDER — METOPROLOL SUCCINATE 50 MG/1
TABLET, EXTENDED RELEASE ORAL
Qty: 90 TABLET | Refills: 3 | Status: SHIPPED | OUTPATIENT
Start: 2022-11-07 | End: 2022-12-07

## 2022-11-07 NOTE — TELEPHONE ENCOUNTER
"Last Written Prescription Date:  11/15/21  Last Fill Quantity: 90,  # refills: 3   Last office visit provider:  10/24/22     Requested Prescriptions   Pending Prescriptions Disp Refills     metoprolol succinate ER (TOPROL XL) 50 MG 24 hr tablet [Pharmacy Med Name: Metoprolol Succinate ER Oral Tablet Extended Release 24 Hour 50 MG] 90 tablet 0     Sig: Take 1 tablet (50 mg total) by mouth daily.       Beta-Blockers Protocol Passed - 11/5/2022  4:58 PM        Passed - Blood pressure under 140/90 in past 12 months     BP Readings from Last 3 Encounters:   10/25/22 122/78   08/25/22 128/84   06/28/22 108/70                 Passed - Patient is age 6 or older        Passed - Recent (12 mo) or future (30 days) visit within the authorizing provider's specialty     Patient has had an office visit with the authorizing provider or a provider within the authorizing providers department within the previous 12 mos or has a future within next 30 days. See \"Patient Info\" tab in inbasket, or \"Choose Columns\" in Meds & Orders section of the refill encounter.              Passed - Medication is active on med list             Brigida Lee RN 11/07/22 2:34 PM  "

## 2022-11-07 NOTE — TELEPHONE ENCOUNTER
LVM that we routed the report to the Provider to review. Once done, we will call patient to update.   Left clinic number to call back if there's any questions.

## 2022-11-12 DIAGNOSIS — I10 ESSENTIAL HYPERTENSION: ICD-10-CM

## 2022-11-13 RX ORDER — AMLODIPINE BESYLATE 5 MG/1
TABLET ORAL
Qty: 90 TABLET | Refills: 0 | Status: SHIPPED | OUTPATIENT
Start: 2022-11-13 | End: 2023-02-08

## 2022-11-13 NOTE — TELEPHONE ENCOUNTER
"Last Written Prescription Date:  11/15/21  Last Fill Quantity: 90,  # refills: 3   Last office visit provider:   10/24/22    Requested Prescriptions   Pending Prescriptions Disp Refills     amLODIPine (NORVASC) 5 MG tablet [Pharmacy Med Name: amLODIPine Besylate Oral Tablet 5 MG] 90 tablet 0     Sig: Take 1 tablet (5 mg total) by mouth daily.       Calcium Channel Blockers Protocol  Passed - 11/12/2022  9:28 AM        Passed - Blood pressure under 140/90 in past 12 months     BP Readings from Last 3 Encounters:   10/25/22 122/78   08/25/22 128/84   06/28/22 108/70                 Passed - Recent (12 mo) or future (30 days) visit within the authorizing provider's specialty     Patient has had an office visit with the authorizing provider or a provider within the authorizing providers department within the previous 12 mos or has a future within next 30 days. See \"Patient Info\" tab in inbasket, or \"Choose Columns\" in Meds & Orders section of the refill encounter.              Passed - Medication is active on med list        Passed - Patient is age 18 or older        Passed - Normal serum creatinine on file in past 12 months     Recent Labs   Lab Test 08/25/22  0914   CR 1.09       Ok to refill medication if creatinine is low               Latesha Felix RN 11/13/22 4:41 PM  "

## 2022-11-15 ENCOUNTER — TRANSFERRED RECORDS (OUTPATIENT)
Dept: HEALTH INFORMATION MANAGEMENT | Facility: CLINIC | Age: 72
End: 2022-11-15

## 2022-12-05 ENCOUNTER — LAB (OUTPATIENT)
Dept: LAB | Facility: HOSPITAL | Age: 72
End: 2022-12-05
Payer: MEDICARE

## 2022-12-05 DIAGNOSIS — R79.82 ELEVATED C-REACTIVE PROTEIN (CRP): ICD-10-CM

## 2022-12-05 LAB — CRP SERPL-MCNC: <3 MG/L

## 2022-12-05 PROCEDURE — 36415 COLL VENOUS BLD VENIPUNCTURE: CPT

## 2022-12-05 PROCEDURE — 86140 C-REACTIVE PROTEIN: CPT

## 2022-12-07 ENCOUNTER — NURSE TRIAGE (OUTPATIENT)
Dept: NURSING | Facility: CLINIC | Age: 72
End: 2022-12-07

## 2022-12-07 ENCOUNTER — HOSPITAL ENCOUNTER (EMERGENCY)
Facility: HOSPITAL | Age: 72
Discharge: HOME | End: 2022-12-07
Attending: EMERGENCY MEDICINE | Admitting: EMERGENCY MEDICINE
Payer: MEDICARE

## 2022-12-07 ENCOUNTER — TRANSFERRED RECORDS (OUTPATIENT)
Dept: HEALTH INFORMATION MANAGEMENT | Facility: CLINIC | Age: 72
End: 2022-12-07

## 2022-12-07 VITALS
SYSTOLIC BLOOD PRESSURE: 126 MMHG | DIASTOLIC BLOOD PRESSURE: 79 MMHG | BODY MASS INDEX: 27.3 KG/M2 | WEIGHT: 195 LBS | TEMPERATURE: 97 F | OXYGEN SATURATION: 93 % | HEIGHT: 71 IN | RESPIRATION RATE: 25 BRPM | HEART RATE: 59 BPM

## 2022-12-07 DIAGNOSIS — I10 ESSENTIAL HYPERTENSION: ICD-10-CM

## 2022-12-07 DIAGNOSIS — T44.7X5A ADVERSE EFFECT OF BETA-BLOCKER, INITIAL ENCOUNTER: ICD-10-CM

## 2022-12-07 DIAGNOSIS — R42 LIGHTHEADEDNESS: ICD-10-CM

## 2022-12-07 LAB
ANION GAP SERPL CALCULATED.3IONS-SCNC: 7 MMOL/L (ref 7–15)
BUN SERPL-MCNC: 23.2 MG/DL (ref 8–23)
CALCIUM SERPL-MCNC: 10.2 MG/DL (ref 8.8–10.2)
CHLORIDE SERPL-SCNC: 102 MMOL/L (ref 98–107)
CREAT SERPL-MCNC: 1.12 MG/DL (ref 0.67–1.17)
DEPRECATED HCO3 PLAS-SCNC: 27 MMOL/L (ref 22–29)
ERYTHROCYTE [DISTWIDTH] IN BLOOD BY AUTOMATED COUNT: 13.3 % (ref 10–15)
GFR SERPL CREATININE-BSD FRML MDRD: 70 ML/MIN/1.73M2
GLUCOSE SERPL-MCNC: 88 MG/DL (ref 70–99)
HCT VFR BLD AUTO: 49.9 % (ref 40–53)
HGB BLD-MCNC: 16.7 G/DL (ref 13.3–17.7)
HOLD SPECIMEN: NORMAL
MAGNESIUM SERPL-MCNC: 2 MG/DL (ref 1.7–2.3)
MCH RBC QN AUTO: 30.1 PG (ref 26.5–33)
MCHC RBC AUTO-ENTMCNC: 33.5 G/DL (ref 31.5–36.5)
MCV RBC AUTO: 90 FL (ref 78–100)
PLATELET # BLD AUTO: 252 10E3/UL (ref 150–450)
POTASSIUM SERPL-SCNC: 4 MMOL/L (ref 3.4–5.3)
RBC # BLD AUTO: 5.54 10E6/UL (ref 4.4–5.9)
SODIUM SERPL-SCNC: 136 MMOL/L (ref 136–145)
WBC # BLD AUTO: 11.2 10E3/UL (ref 4–11)

## 2022-12-07 PROCEDURE — 99284 EMERGENCY DEPT VISIT MOD MDM: CPT

## 2022-12-07 PROCEDURE — 36415 COLL VENOUS BLD VENIPUNCTURE: CPT | Performed by: EMERGENCY MEDICINE

## 2022-12-07 PROCEDURE — 85018 HEMOGLOBIN: CPT | Performed by: EMERGENCY MEDICINE

## 2022-12-07 PROCEDURE — 83735 ASSAY OF MAGNESIUM: CPT | Performed by: EMERGENCY MEDICINE

## 2022-12-07 PROCEDURE — 80048 BASIC METABOLIC PNL TOTAL CA: CPT | Performed by: EMERGENCY MEDICINE

## 2022-12-07 PROCEDURE — 93005 ELECTROCARDIOGRAM TRACING: CPT | Performed by: EMERGENCY MEDICINE

## 2022-12-07 RX ORDER — METOPROLOL SUCCINATE 25 MG/1
25 TABLET, EXTENDED RELEASE ORAL DAILY
Qty: 30 TABLET | Refills: 0 | Status: SHIPPED | OUTPATIENT
Start: 2022-12-07 | End: 2023-02-01

## 2022-12-07 ASSESSMENT — ACTIVITIES OF DAILY LIVING (ADL): ADLS_ACUITY_SCORE: 35

## 2022-12-07 NOTE — DISCHARGE INSTRUCTIONS
I think that you are on too high of a dose of metoprolol.  Decrease your metoprolol from 50 to 25 mg daily.  Monitor blood pressure/heart rate once daily and keep a diary of this and bring this to your follow-up appointment with your primary care doctor as scheduled in January.

## 2022-12-07 NOTE — TELEPHONE ENCOUNTER
"A couple of days (yakelin says at least one week). Takes metoprolol succinate 50 mg. In the past week getting dizzy and lightheaded. Wants to talk with Dr Bill. [facility] said he can cut back on metoprolol. They recommended 37.5 mg. Wants to talk with Dr Bill before he makes that change or someone else if Dr Bill is off. . He can cut the pill in half (25 mg). He has an appointment January 17th. Not bad until taking the full dose this morning. Last night with as 107/62, pulse 58. 127/60 with pulse 62 this morning. His heart rate before taking the pills. With taking it had dizziness and irregular heart rate again. [place] said he has to have a sleep study. Dizziness occurs after taking the medication.  Irregular heart beat occurs but they attribute that to his cardiac changes related to having covid-19 effect his heart and heart beats.     Dr Sadler is on call for [facility].  Paged at 7:21 a.m. by page . Dr Bill called back and wants the patient to be seen in the ER. I called and told this to the patient. He will get there within one hour. He wants to make sure Dr Bill knows about this and that he talks with him with he's next in the clinic. I told him I will route the message to Dr Bill and his team.    Archana Troncoso RN  [facility]    Reason for Disposition    Extra heart beats OR irregular heart beating (i.e., \"palpitations\")    Additional Information    Negative: SEVERE difficulty breathing (e.g., struggling for each breath, speaks in single words)    Negative: [1] Difficulty breathing or swallowing AND [2] started suddenly after medicine, an allergic food or bee sting    Negative: Shock suspected (e.g., cold/pale/clammy skin, too weak to stand, low BP, rapid pulse)    Negative: Difficult to awaken or acting confused (e.g., disoriented, slurred speech)    Negative: [1] Weakness (i.e., paralysis, loss of muscle strength) of the face, arm or leg on one side " "of the body AND [2] sudden onset AND [3] present now    Negative: [1] Numbness (i.e., loss of sensation) of the face, arm or leg on one side of the body AND [2] sudden onset AND [3] present now    Negative: [1] Loss of speech or garbled speech AND [2] sudden onset AND [3] present now    Negative: Overdose (accidental or intentional) of medications    Negative: [1] Fainted > 15 minutes ago AND [2] still feels too weak or dizzy to stand    Negative: Heart beating < 50 beats per minute OR > 140 beats per minute    Negative: Sounds like a life-threatening emergency to the triager    Negative: Chest pain    Negative: Rectal bleeding, bloody stool, or tarry-black stool    Negative: [1] Vomiting AND [2] contains red blood or black (\"coffee ground\") material    Negative: Vomiting is main symptom    Negative: Diarrhea is main symptom    Negative: Headache is main symptom     Little headache at 1    Negative: Patient states that they are having an anxiety or panic attack    Negative: Dizziness from low blood sugar (i.e., < 60 mg/dl or 3.5 mmol/l)    Negative: Dizziness is described as a spinning sensation (i.e., vertigo)    Negative: Heat exhaustion suspected (i.e., dehydration from heat exposure)    Negative: Difficulty breathing    Negative: SEVERE dizziness (e.g., unable to stand, requires support to walk, feels like passing out now)    Protocols used: DIZZINESS - [id number]      "

## 2022-12-07 NOTE — ED TRIAGE NOTES
amb to rm5.  Pt states having fatigue for a couple weeks.  Pt c/o lightheaded and dizziness for about 1 wk. Not sure if related to BP med.  Denies sx at this time. Denies CP or SOB.      Triage Assessment     Row Name 12/07/22 0812       Triage Assessment (Adult)    Airway WDL WDL       Respiratory WDL    Respiratory WDL WDL       Skin Circulation/Temperature WDL    Skin Circulation/Temperature WDL WDL       Cardiac WDL    Cardiac WDL WDL       Peripheral/Neurovascular WDL    Peripheral Neurovascular WDL WDL       Cognitive/Neuro/Behavioral WDL    Cognitive/Neuro/Behavioral WDL WDL

## 2022-12-07 NOTE — ED PROVIDER NOTES
EMERGENCY DEPARTMENT ENCOUNTER      NAME: Chan Bishop  AGE: 72 year old male  YOB: 1950  MRN: [id number]  EVALUATION DATE & TIME: 12/7/2022  8:08 AM    PCP: Florentin Bill    ED PROVIDER: Caty Guerra MD    Chief Complaint   Patient presents with     Dizziness         FINAL IMPRESSION:  1. Lightheadedness    2. Essential hypertension    3. Adverse effect of beta-blocker, initial encounter          ED COURSE & MEDICAL DECISION MAKING:    Pertinent Labs & Imaging studies reviewed. (See chart for details)  72 year old male with history of myocarditis, CHF, HTN and HLD who presents to the Emergency Department for evaluation of lightheadedness dizziness over the course of the last week worse when he gets up and walks around.  Patient just saw cardiology and had a fairly extensive outpatient work-up.  The thought was that this lightheadedness may be related to sleep as it does seem to be worse in the morning and then get better throughout the day.  He however had a sleep study that was unremarkable.  He is on a decent dose of metoprolol at 50 mg long-acting once daily and with this is having episodes of bradycardia and borderline hypotension at home.  I suspect that he might just be over beta blocked.  Symptomatic with this.  He does not have any associated chest pain, palpitations or really dyspnea with exertion to suspect ACS.  Have occasional palpitations, but nothing that is persistent to suspect an underlying arrhythmia.  He denies any dark tarry stools, blood in the stools to suspect an occult anemia or GI bleed and is not anticoagulated other than aspirin.  Recent thyroid testing this fall was unremarkable and I do not think this warrants repeat.    Patient placed on monitor, IV established and blood obtained.  Twelve-lead EKG shows sinus rhythm with bifascicular block unchanged from previous.  CBC, BMP, magnesium unremarkable.  Patient counseled to half his home metoprolol from 50 to 25  mg daily.  Monitor blood pressure/pulse and keep his follow-up appoint with PCP as scheduled for January.    8:09 AM I met with the patient for an interview and initial exam. Plans for care were discussed.      ED Course as of 12/07/22 1408   Wed Dec 07, 2022   0904 I updated the patient       Medical Decision Making    History:    Supplemental history from: Family Member/Significant Other    External Record(s) reviewed: Outpatient Record: [facility] cardiology visit recently    Work Up:    Chart documentation includes differential considered and any EKGs or imaging interpreted by provider.    In additional to work up documented, I considered the following work up: See chart documentation, if applicable.    External consultation:    Discussion of management with another provider: See chart documentation, if applicable    Complicating factors:    Care impacted by chronic illness: Heart Disease, Hyperlipidemia and Hypertension    Care affected by social determinants of health: N/A    Disposition considerations: Discharge. I prescribed additional prescription strength medication(s) as charted. N/A.        At the conclusion of the encounter I discussed the results of all of the tests and the disposition. The questions were answered. The patient or family acknowledged understanding and was agreeable with the care plan.      MEDICATIONS GIVEN IN THE EMERGENCY:  Medications - No data to display    NEW PRESCRIPTIONS STARTED AT TODAY'S ER VISIT  Discharge Medication List as of 12/7/2022  9:11 AM             =================================================================    HPI    Patient information was obtained from: Patient    Use of Intrepreter: N/A     Chan Bishop is a 72 year old male with pertinent medical history of hypertension, hyperlipidemia, congestive heart failure, and heart disease who presents to the ED with his wife for evaluation of dizziness.      The patient has had an onset of dizziness and headaches this  morning around 0300 after taking his medication. He has experienced these symptoms since last week and is progressively worsening. He endorsed symptoms are worse around the afternoon, and is relieving at night. He endorsed secondary symptoms of erratic palpitation. He denies having chest pain, shortness of breath and any other symptoms. The patient takes aspirin and metoprolol. He believes the symptoms may be due to taking 50 mg of metoprolol and wants to lower by half. He has talked to his PCP regarding this.      Chart Review     He was admitted to the hospital around December 2019 after findings of elevated troponin. On 12/03/2019, he received a transthoracic echocardiogram, revealing mildly decreased left ventricular ejection faction 48% hypokinesis involving the basal inferolateral and basal anterolateral segments. He received a repeat transthoracic echocardiogram on 12/20/2019  Demonstrating normal LV size and ejection fraction 55% with mild concentric hypertrophy. The regional wall motion abnormalities were still present, but he was able to return to his prior activities.     He was seen in the Emergency department on [id number] for symptoms of chest tightness and left arm pain. He received a CT scan of the chest and revealed ground-glass opacities in the bilateral upper lobes with suggestion of chronic infarction of the left ventricle at the lateral base. Pt was diagnosed with pneumonia and treated with Zithromax.      On 03/08/2022, pt had a phone discussion with Dr. Acuna regarding his history of atrial tachycardia as a child and level of activeness since admission in 2019. His wife believes the symptoms may be due to the medication. The patient describes his symptoms to have slowly gotten better but is still concerned about a bifascicular block identified on ECG. His mild cardio myopathy and bifascicular block may be result of viral myocarditis and discussed of repeating cardiac MRI.    On 03/29/2022,  Pt went on cardiopulmonary exercise test, demonstrating a slightly abnormal chronotropic response with rise in heart reate only to 122 beats per minute. Pt's fatigue is suspected to primarily mediated to his poor sleep. His overnight oximetry test suggest some fluctuations in his oxygen levels that could be consistent with sleep disordered breathing. Pt was recommended sleep evaluation locally and consider backing off slightly on the metoprolol succinate to 37.5 mg daily or 25 mg daily to combat with possible beta-blocker fatigue.       REVIEW OF SYSTEMS  Constitutional:  Denies fever, chills, weight loss or weakness  Respiratory: No SOB, wheeze or cough  Cardiovascular:  No CP. Positive for palpitation.  GI:  Denies abdominal pain, nausea, vomiting, diarrhea  Musculoskeletal:  Denies any new muscle/joint pain, swelling or loss of function.  Neurologic:  Positive for headache and dizziness.  No vertigo.  All other systems negative unless noted in HPI.      PAST MEDICAL HISTORY:  Past Medical History:   Diagnosis Date     Arthritis Osteoarthritis 2020     Congestive heart failure (H) 2021     Heart disease december 2019     HTN (hypertension)      Hyperlipidemia      Prostate CA (H)        PAST SURGICAL HISTORY:  Past Surgical History:   Procedure Laterality Date     BIOPSY  1996     COLONOSCOPY  2017     CV CORONARY ANGIOGRAM N/A 12/03/2019    Procedure: Coronary Angiogram;  Surgeon: Sheryl Mccormick MD;  Location: [facility] Cath Lab;  Service: Cardiology     CV LEFT HEART CATHETERIZATION WITHOUT LEFT VENTRICULOGRAM Left 12/03/2019    Procedure: Left Heart Catheterization Without Left Ventriculogram;  Surgeon: Sheryl Mccormick MD;  Location: [facility] Cath Lab;  Service: Cardiology     HIP SURGERY       ORTHOPEDIC SURGERY  Hip resurfacing 2010     PROSTATECTOMY  01/01/1996       CURRENT MEDICATIONS:    Prior to Admission Medications   Prescriptions Last Dose Informant Patient Reported? Taking?   amLODIPine  "(NORVASC) 5 MG tablet   No No   Sig: Take 1 tablet (5 mg total) by mouth daily.   aspirin 81 mg chewable tablet   No No   Sig: [ASPIRIN 81 MG CHEWABLE TABLET] Chew 1 tablet (81 mg total) daily.   lisinopril (ZESTRIL) 20 MG tablet   No No   Sig: Take 1 tablet (20 mg total) by mouth daily.   lovastatin (MEVACOR) 10 MG tablet   No No   Sig: Take 1 tablet (10 mg total) by mouth at bedtime.   magnesium 30 mg tablet   Yes No   Sig: Take 400 mg by mouth daily   meloxicam (MOBIC) 15 MG tablet   No No   Sig: Take 1 tablet (15 mg) by mouth daily   Patient not taking: Reported on 10/25/2022   metoprolol succinate ER (TOPROL XL) 50 MG 24 hr tablet   No No   Sig: Take 1 tablet (50 mg total) by mouth daily.      Facility-Administered Medications: None       ALLERGIES:  Allergies   Allergen Reactions     Ciprofloxacin      Dizziness and leg numbness     Metronidazole      Dizziness and leg numbness     Sulfa (Sulfonamide Antibiotics) [Sulfa Drugs] Rash       FAMILY HISTORY:  Family History   Problem Relation Age of Onset     Ovarian Cancer Mother      Other Cancer Mother      Hypertension Father      Cerebrovascular Disease Paternal Grandfather      Diabetes Other         Aunt     Prostate Cancer Brother      Other Cancer Sister        SOCIAL HISTORY:  Social History     Tobacco Use     Smoking status: Never     Smokeless tobacco: Never     Tobacco comments:     /70 pulse 76 yesterday per wife   Vaping Use     Vaping Use: Never used   Substance Use Topics     Alcohol use: Not Currently     Comment: rare     Drug use: No        VITALS:  Patient Vitals for the past 24 hrs:   BP Temp Temp src Pulse Resp SpO2 Height Weight   12/07/22 0900 126/79 -- -- 59 25 93 % -- --   12/07/22 0845 118/74 -- -- 59 18 96 % -- --   12/07/22 0830 124/84 -- -- 66 19 98 % -- --   12/07/22 0815 (!) 148/96 -- -- 72 25 99 % -- --   12/07/22 0811 (!) 159/93 97  F (36.1  C) Temporal 68 18 99 % 1.803 m (5' 11\") 88.5 kg (195 lb)       PHYSICAL EXAM  "   General Appearance: Well-appearing, well-nourished, no acute distress   Head:  Normocephalic  Eyes:   conjunctiva/corneas clear  ENT:  membranes are moist without pallor  Neck:  Supple  Chest:  No tenderness or deformity, no crepitus  Cardio: Borderline bradycardia in 50s to 60s, regular rhythm, no murmur/gallop/rub, 2+ pulses symmetric in all extremities  Pulm:  No respiratory distress, clear to auscultation bilaterally  Abdomen:  Soft, non-tender, non distended,no rebound or guarding.  Extremities: Moves all extremities normally, normal gait  Skin:  Skin warm, dry, no rashes  Neuro:  Alert and oriented ×3, moving all extremities, no gross sensory defects     RADIOLOGY/LABS:  Reviewed all pertinent imaging. Please see official radiology report. All pertinent labs reviewed and interpreted.    Results for orders placed or performed during the hospital encounter of 12/07/22   Extra Blue Top Tube   Result Value Ref Range    Hold Specimen JIC    Extra Red Top Tube   Result Value Ref Range    Hold Specimen JIC    Extra Green Top (Lithium Heparin) Tube   Result Value Ref Range    Hold Specimen JIC    Extra Purple Top Tube   Result Value Ref Range    Hold Specimen JIC    Basic metabolic panel   Result Value Ref Range    Sodium 136 136 - 145 mmol/L    Potassium 4.0 3.4 - 5.3 mmol/L    Chloride 102 98 - 107 mmol/L    Carbon Dioxide (CO2) 27 22 - 29 mmol/L    Anion Gap 7 7 - 15 mmol/L    Urea Nitrogen 23.2 (H) 8.0 - 23.0 mg/dL    Creatinine 1.12 0.67 - 1.17 mg/dL    Calcium 10.2 8.8 - 10.2 mg/dL    Glucose 88 70 - 99 mg/dL    GFR Estimate 70 >60 mL/min/1.73m2   Result Value Ref Range    Magnesium 2.0 1.7 - 2.3 mg/dL   CBC with platelets   Result Value Ref Range    WBC Count 11.2 (H) 4.0 - 11.0 10e3/uL    RBC Count 5.54 4.40 - 5.90 10e6/uL    Hemoglobin 16.7 13.3 - 17.7 g/dL    Hematocrit 49.9 40.0 - 53.0 %    MCV 90 78 - 100 fL    MCH 30.1 26.5 - 33.0 pg    MCHC 33.5 31.5 - 36.5 g/dL    RDW 13.3 10.0 - 15.0 %    Platelet  Count 252 150 - 450 10e3/uL       EKG:  Performed at: 08:12    Impression: Sinus rhythm  Left axis deviation  Right bundle branch block  Abnormal ECG   When compared with ECG of 28-JUN-2022 13:52  No significant change was found    Rate: 68 bpm  Rhythm: sinus  Axis: 55 -43 34  AR Interval: 148 ms  QRS Interval: 138 ms  QTc Interval: 416/442 ms  Comparison: 28-JUN-2022 13:52  I have independently reviewed and interpreted the EKG(s) documented above.    The creation of this record is based on the scribe s observations of the work being performed by Caty Guerra MD and the provider s statements to them. It was created on her behalf by Gerald Olmstead, a trained medical scribe. This document has been checked and approved by the attending provider.    Caty Guerra MD  Emergency Medicine  [facility] EMERGENCY DEPARTMENT  [facility] [facility] [address]  [phone]  Dept: [phone]       Caty Guerra MD  12/07/22 5239

## 2022-12-15 ENCOUNTER — TELEPHONE (OUTPATIENT)
Dept: INTERNAL MEDICINE | Facility: CLINIC | Age: 72
End: 2022-12-15

## 2023-01-09 ENCOUNTER — TRANSFERRED RECORDS (OUTPATIENT)
Dept: HEALTH INFORMATION MANAGEMENT | Facility: CLINIC | Age: 73
End: 2023-01-09
Payer: MEDICARE

## 2023-01-09 LAB — TSH SERPL-ACNC: 1.59 UIU/ML (ref 0.45–4.5)

## 2023-01-13 ENCOUNTER — TRANSFERRED RECORDS (OUTPATIENT)
Dept: HEALTH INFORMATION MANAGEMENT | Facility: CLINIC | Age: 73
End: 2023-01-13

## 2023-02-01 ENCOUNTER — MYC MEDICAL ADVICE (OUTPATIENT)
Dept: INTERNAL MEDICINE | Facility: CLINIC | Age: 73
End: 2023-02-01

## 2023-02-01 ENCOUNTER — VIRTUAL VISIT (OUTPATIENT)
Dept: URGENT CARE | Facility: CLINIC | Age: 73
End: 2023-02-01
Payer: MEDICARE

## 2023-02-01 DIAGNOSIS — J01.00 ACUTE NON-RECURRENT MAXILLARY SINUSITIS: Primary | ICD-10-CM

## 2023-02-01 DIAGNOSIS — U07.1 INFECTION DUE TO 2019 NOVEL CORONAVIRUS: ICD-10-CM

## 2023-02-01 PROCEDURE — 99213 OFFICE O/P EST LOW 20 MIN: CPT | Mod: CS

## 2023-02-01 RX ORDER — METOPROLOL SUCCINATE 50 MG/1
50 TABLET, EXTENDED RELEASE ORAL DAILY
COMMUNITY
Start: 2023-02-01 | End: 2023-08-28

## 2023-02-01 NOTE — PROGRESS NOTES
Chan Bishop is being evaluated via a billable video visit.      Assessment & Plan:     Suspect pt has had COVID for greater than 5 days due to symptom duration, so will not treat with antiviral medication. Suspect acute sinusitis that is of bacterial etiology and not responding to doxycycline, so will have him discontinue this and Rx Augmentin instead.    See patient instructions below.  At the end of the encounter, I discussed results, diagnosis, medications. Discussed red flags for being seen in person at clinic/ER, as well as indications for follow up if no improvement. Patient understood and agreed to plan.       ICD-10-CM    1. Acute non-recurrent maxillary sinusitis  J01.00 amoxicillin-clavulanate (AUGMENTIN) 875-125 MG tablet      2. Infection due to 2019 novel coronavirus  U07.1             No follow-ups on file.    Video-Visit Details    Type of service:  Video Visit    Video Start Time: 1:01pm  Video End Time: 1:23pm    Originating Location (pt. Location): Home    Distant [facility] (provider [facility]     Mode of Communication:  Video Conference via [facility]    SHYAM Fitzgerald, PAVenessaC  [place] [facility]    Subjective:   Chan Bishop is a 72 year old male who is contacted via telephone thru scheduled urgent care virtual visit to discuss: No chief complaint on file.    Nasal congestion x 1 month, L facial pain/sinus pressure onset 12 days ago. He saw ENT on 1/27 and was diagnosed with acute sinusitis, prescribed doxycycline. He has been on this for 5 days and has not noticed improvement. His wife tested positive for COVID yesterday so he decided to take a test today, and it was positive. He has no CP, SOB, fever, sore throat, N/V/D.    Past Medical History:   Diagnosis Date     Arthritis Osteoarthritis 2020     Congestive heart failure (H) 2021     Heart disease december 2019     HTN (hypertension)      Hyperlipidemia      Prostate CA (H)       Objective:   Gen:  NAD  Pulm: non-labored work of breathing    No results found for any visits on 02/01/23.    There are no Patient Instructions on file for this visit.

## 2023-02-08 DIAGNOSIS — I10 ESSENTIAL HYPERTENSION: ICD-10-CM

## 2023-02-08 RX ORDER — AMLODIPINE BESYLATE 5 MG/1
TABLET ORAL
Qty: 90 TABLET | Refills: 1 | Status: SHIPPED | OUTPATIENT
Start: 2023-02-08 | End: 2023-08-15

## 2023-02-08 RX ORDER — LISINOPRIL 20 MG/1
TABLET ORAL
Qty: 90 TABLET | Refills: 1 | Status: SHIPPED | OUTPATIENT
Start: 2023-02-08 | End: 2023-08-15

## 2023-02-09 NOTE — TELEPHONE ENCOUNTER
"Lisinopril:  Last Written Prescription Date:  8/16/2022  Last Fill Quantity: 90,  # refills: 1     Amlodipine:  Last Written Prescription Date: 11/13/2022  Last Fill Quantity: 90,  # refills: 0    Last office visit provider: 8/25/2022 with PCP Dr COTY Bill     Requested Prescriptions   Pending Prescriptions Disp Refills     lisinopril (ZESTRIL) 20 MG tablet [Pharmacy Med Name: Lisinopril Oral Tablet 20 MG] 90 tablet 0     Sig: Take 1 tablet (20 mg total) by mouth daily.       ACE Inhibitors (Including Combos) Protocol Passed - 2/8/2023 11:23 PM        Passed - Blood pressure under 140/90 in past 12 months     BP Readings from Last 3 Encounters:   12/07/22 126/79   10/25/22 122/78   08/25/22 128/84                 Passed - Recent (12 mo) or future (30 days) visit within the authorizing provider's specialty     Patient has had an office visit with the authorizing provider or a provider within the authorizing providers department within the previous 12 mos or has a future within next 30 days. See \"Patient Info\" tab in inbasket, or \"Choose Columns\" in Meds & Orders section of the refill encounter.              Passed - Medication is active on med list        Passed - Patient is age 18 or older        Passed - Normal serum creatinine on file in past 12 months     Recent Labs   Lab Test 12/07/22  0817   CR 1.12       Ok to refill medication if creatinine is low          Passed - Normal serum potassium on file in past 12 months     Recent Labs   Lab Test 12/07/22  0817   POTASSIUM 4.0                amLODIPine (NORVASC) 5 MG tablet [Pharmacy Med Name: amLODIPine Besylate Oral Tablet 5 MG] 90 tablet 0     Sig: Take 1 tablet (5 mg total) by mouth daily.       Calcium Channel Blockers Protocol  Passed - 2/8/2023 11:23 PM        Passed - Blood pressure under 140/90 in past 12 months     BP Readings from Last 3 Encounters:   12/07/22 126/79   10/25/22 122/78   08/25/22 128/84                 Passed - Recent (12 mo) or future " "(30 days) visit within the authorizing provider's specialty     Patient has had an office visit with the authorizing provider or a provider within the authorizing providers department within the previous 12 mos or has a future within next 30 days. See \"Patient Info\" tab in inbasket, or \"Choose Columns\" in Meds & Orders section of the refill encounter.              Passed - Medication is active on med list        Passed - Patient is age 18 or older        Passed - Normal serum creatinine on file in past 12 months     Recent Labs   Lab Test 12/07/22  0817   CR 1.12       Ok to refill medication if creatinine is low               Ivette Lua RN 02/08/23 11:23 PM  "

## 2023-02-28 ENCOUNTER — TRANSFERRED RECORDS (OUTPATIENT)
Dept: HEALTH INFORMATION MANAGEMENT | Facility: CLINIC | Age: 73
End: 2023-02-28
Payer: MEDICARE

## 2023-03-02 ENCOUNTER — TRANSFERRED RECORDS (OUTPATIENT)
Dept: HEALTH INFORMATION MANAGEMENT | Facility: CLINIC | Age: 73
End: 2023-03-02

## 2023-03-02 ENCOUNTER — OFFICE VISIT (OUTPATIENT)
Dept: INTERNAL MEDICINE | Facility: CLINIC | Age: 73
End: 2023-03-02
Payer: MEDICARE

## 2023-03-02 ENCOUNTER — HOSPITAL ENCOUNTER (OUTPATIENT)
Dept: GENERAL RADIOLOGY | Facility: HOSPITAL | Age: 73
Discharge: HOME | End: 2023-03-02
Attending: INTERNAL MEDICINE | Admitting: INTERNAL MEDICINE
Payer: MEDICARE

## 2023-03-02 VITALS
BODY MASS INDEX: 26.1 KG/M2 | DIASTOLIC BLOOD PRESSURE: 64 MMHG | WEIGHT: 192.7 LBS | SYSTOLIC BLOOD PRESSURE: 120 MMHG | RESPIRATION RATE: 22 BRPM | OXYGEN SATURATION: 98 % | HEART RATE: 62 BPM | HEIGHT: 72 IN

## 2023-03-02 DIAGNOSIS — Z01.818 PREOPERATIVE EXAMINATION: ICD-10-CM

## 2023-03-02 DIAGNOSIS — Z01.818 PREOPERATIVE EXAMINATION: Primary | ICD-10-CM

## 2023-03-02 LAB
ANION GAP SERPL CALCULATED.3IONS-SCNC: 10 MMOL/L (ref 7–15)
BUN SERPL-MCNC: 13.1 MG/DL (ref 8–23)
CALCIUM SERPL-MCNC: 9.9 MG/DL (ref 8.8–10.2)
CHLORIDE SERPL-SCNC: 106 MMOL/L (ref 98–107)
CREAT SERPL-MCNC: 1.22 MG/DL (ref 0.67–1.17)
DEPRECATED HCO3 PLAS-SCNC: 25 MMOL/L (ref 22–29)
ERYTHROCYTE [DISTWIDTH] IN BLOOD BY AUTOMATED COUNT: 13.4 % (ref 10–15)
GFR SERPL CREATININE-BSD FRML MDRD: 63 ML/MIN/1.73M2
GLUCOSE SERPL-MCNC: 107 MG/DL (ref 70–99)
HCT VFR BLD AUTO: 46.3 % (ref 40–53)
HGB BLD-MCNC: 15.5 G/DL (ref 13.3–17.7)
MCH RBC QN AUTO: 29.6 PG (ref 26.5–33)
MCHC RBC AUTO-ENTMCNC: 33.5 G/DL (ref 31.5–36.5)
MCV RBC AUTO: 89 FL (ref 78–100)
PLATELET # BLD AUTO: 240 10E3/UL (ref 150–450)
POTASSIUM SERPL-SCNC: 4.6 MMOL/L (ref 3.4–5.3)
RBC # BLD AUTO: 5.23 10E6/UL (ref 4.4–5.9)
SODIUM SERPL-SCNC: 141 MMOL/L (ref 136–145)
WBC # BLD AUTO: 6.6 10E3/UL (ref 4–11)

## 2023-03-02 PROCEDURE — 71046 X-RAY EXAM CHEST 2 VIEWS: CPT

## 2023-03-02 PROCEDURE — 36415 COLL VENOUS BLD VENIPUNCTURE: CPT | Performed by: INTERNAL MEDICINE

## 2023-03-02 PROCEDURE — 99214 OFFICE O/P EST MOD 30 MIN: CPT | Performed by: INTERNAL MEDICINE

## 2023-03-02 PROCEDURE — 80048 BASIC METABOLIC PNL TOTAL CA: CPT | Performed by: INTERNAL MEDICINE

## 2023-03-02 PROCEDURE — 85027 COMPLETE CBC AUTOMATED: CPT | Performed by: INTERNAL MEDICINE

## 2023-03-02 NOTE — PROGRESS NOTES
[address] [address]  Phone: [phone]  Fax: [phone]  Primary Provider: Shelly Pope  Pre-op Performing Provider: SHELLY POPE    {Provider  Link to PREOP SmartSet  Use this to apply standard patient instructions to AVS; includes medication directions, common orders, guidelines for anemia, warfarin, additional testing   :950317}  PREOPERATIVE EVALUATION:  Today's date: 3/2/2023    Chan Bishop is a 72 year old male who presents for a preoperative evaluation.    Surgical Information:  Surgery/Procedure: Septumplasty   Surgery Location: [facility]  Surgeon: Dr. Savage  Surgery Date: 3/17/23  Time of Surgery: TBD  Where patient plans to recover: At home with family  Fax number for surgical facility: [phone] and [phone]    Type of Anesthesia Anticipated: to be determined    {2021 Provider Charting Preference for Preop :616928}    Subjective     HPI related to upcoming procedure: ***    Preop Questions 3/2/2023   1. Have you ever had a heart attack or stroke? No   2. Have you ever had surgery on your heart or blood vessels, such as a stent placement, a coronary artery bypass, or surgery on an artery in your head, neck, heart, or legs? No   3. Do you have chest pain with activity? No   4. Do you have a history of  heart failure? No   5. Do you currently have a cold, bronchitis or symptoms of other infection? No   6. Do you have a cough, shortness of breath, or wheezing? No   7. Do you or anyone in your family have previous history of blood clots? No   8. Do you or does anyone in your family have a serious bleeding problem such as prolonged bleeding following surgeries or cuts? No   9. Have you ever had problems with anemia or been told to take iron pills? No   10. Have you had any abnormal blood loss such as black, tarry or bloody stools? No   11. Have you ever had a blood transfusion? No   12. Are you  willing to have a blood transfusion if it is medically needed before, during, or after your surgery? NO - ***   13. Have you or any of your relatives ever had problems with anesthesia? No   14. Do you have sleep apnea, excessive snoring or daytime drowsiness? No   15. Do you have any artifical heart valves or other implanted medical devices like a pacemaker, defibrillator, or continuous glucose monitor? No   16. Do you have artificial joints? No   17. Are you allergic to latex? 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     Priority: Medium     Cardiomyopathy, unspecified type (H)      Priority: Medium     ACS (acute coronary syndrome) (H) 12/02/2019     Priority: Medium     Pleuritic chest pain 12/02/2019     Priority: Medium     Intermittent- d dimer normal in urgency center         Adenocarcinoma Of The Prostate Gland      Priority: Medium     1996; prostatectomy         Essential hypertension      Priority: Medium     Created by Conversion  Replacement Utility updated for latest IMO load         Reactions To Sulfa Drugs      Priority: Medium     Created by Conversion          Past Medical History:   Diagnosis Date     Arthritis Osteoarthritis 2020     Congestive heart failure (H) 2021     Heart disease december 2019     HTN (hypertension)      Hyperlipidemia      Prostate CA (H)      Past Surgical History:   Procedure Laterality Date     BIOPSY  1996     COLONOSCOPY  2017     CV CORONARY ANGIOGRAM N/A 12/03/2019    Procedure: Coronary  "Angiogram;  Surgeon: Sheryl Mccormick MD;  Location: [facility] Cath Lab;  Service: Cardiology     CV LEFT HEART CATHETERIZATION WITHOUT LEFT VENTRICULOGRAM Left 2019    Procedure: Left Heart Catheterization Without Left Ventriculogram;  Surgeon: Sheryl Mccormick MD;  Location: [facility] Cath Lab;  Service: Cardiology     HIP SURGERY       ORTHOPEDIC SURGERY  Hip resurfacing 2010     PROSTATECTOMY  1996     Current Outpatient Medications   Medication Sig Dispense Refill     amLODIPine (NORVASC) 5 MG tablet Take 1 tablet (5 mg total) by mouth daily. 90 tablet 1     aspirin 81 mg chewable tablet [ASPIRIN 81 MG CHEWABLE TABLET] Chew 1 tablet (81 mg total) daily.  0     lisinopril (ZESTRIL) 20 MG tablet Take 1 tablet (20 mg total) by mouth daily. 90 tablet 1     lovastatin (MEVACOR) 10 MG tablet Take 1 tablet (10 mg total) by mouth at bedtime. 90 tablet 2     metoprolol succinate ER (TOPROL XL) 50 MG 24 hr tablet Take 1 tablet (50 mg) by mouth daily         Allergies   Allergen Reactions     Ciprofloxacin      Dizziness and leg numbness     Metronidazole      Dizziness and leg numbness     Sulfa (Sulfonamide Antibiotics) [Sulfa Drugs] Rash        Social History     Tobacco Use     Smoking status: Never     Smokeless tobacco: Never     Tobacco comments:     /70 pulse 76 yesterday per wife   Substance Use Topics     Alcohol use: Not Currently     Comment: rare     {FAMILY HISTORY (Optional):200018909}  History   Drug Use No         Objective     There were no vitals taken for this visit.    Physical Exam  {EXAM Preop Choices:329017}    Recent Labs   Lab Test 22  0817 22  0914   HGB 16.7 15.2    239    140   POTASSIUM 4.0 4.6   CR 1.12 1.09        Diagnostics:  {LABS:077626}   {EK}    Revised Cardiac Risk Index (RCRI):  The patient has the following serious cardiovascular risks for perioperative complications:  {PREOP REVISED CARDIAC RISK INDEX (RCRI) :137491::\" - No " "serious cardiac risks = 0 points\"}     RCRI Interpretation: {REVISED CARDIAC RISK INTERPRETATION :775839}         Signed Electronically by: Florentin Bill MD  Copy of this evaluation report is provided to requesting physician.    {Provider Resources  [facility] Guidelines  Revised Cardiac Risk Index :515954}  "

## 2023-03-02 NOTE — PROGRESS NOTES
Pre-Op Note:  Today's date: March 2, 2023    Chan Bishop is a 72 year old male who presents for a preoperative evaluation.    Surgical Information:  Surgery/Procedure: Preoperative examination anticipation of sinus surgery for congested sinuses with Dr. Maverick Savage March 17, 2023 [facility].  Surgery Location: [facility]  Surgeon: Dr. Maverick Savage  Surgery Date: March 17, 2023  Fax number for surgical facility: Not known    Subjective:   72-year-old male retired with chronic sinus congestion.  Needs sinus surgery.  Chest x-ray hemogram and basic metabolic profile ordered preoperatively.    ROS:   14 point negative    Medications:     Current Outpatient Medications:      amLODIPine (NORVASC) 5 MG tablet, Take 1 tablet (5 mg total) by mouth daily., Disp: 90 tablet, Rfl: 1     aspirin 81 mg chewable tablet, [ASPIRIN 81 MG CHEWABLE TABLET] Chew 1 tablet (81 mg total) daily., Disp: , Rfl: 0     lisinopril (ZESTRIL) 20 MG tablet, Take 1 tablet (20 mg total) by mouth daily., Disp: 90 tablet, Rfl: 1     lovastatin (MEVACOR) 10 MG tablet, Take 1 tablet (10 mg total) by mouth at bedtime., Disp: 90 tablet, Rfl: 2     metoprolol succinate ER (TOPROL XL) 50 MG 24 hr tablet, Take 1 tablet (50 mg) by mouth daily, Disp: , Rfl:      Allergies:  Allergies   Allergen Reactions     Ciprofloxacin      Dizziness and leg numbness     Metronidazole      Dizziness and leg numbness     Sulfa (Sulfonamide Antibiotics) [Sulfa Drugs] Rash       Immunizations:   Immunization History   Administered Date(s) Administered     COVID-19 Vaccine 12+ (Pfizer 2022) 03/31/2022     COVID-19 Vaccine 12+ (Pfizer) 01/23/2021, 02/12/2021, 09/24/2021     COVID-19 Vaccine Bivalent Booster 12+ (Pfizer) 09/06/2022     DT (PEDS <7y) 06/02/2004     FLU 6-35 months 08/28/2010     Flu 65+ Years 09/06/2022     Flu, Unspecified 09/15/2020, 09/21/2021     Flu-nasal, Unspecified 09/17/2018     HepA, Unspecified  01/10/2008     HepA-Adult 01/10/2008, 09/02/2009     HepB-Adult 08/11/2015, 09/11/2015, 02/23/2016     Influenza (H1N1) 12/30/2009     Influenza (High Dose) 3 valent vaccine 09/11/2015, 10/14/2016, 09/30/2017     Influenza (IIV3) PF 09/02/2009     Influenza Vaccine 50-64 or 18-64 w/egg allergy (Flublok) 10/17/2019     Influenza Vaccine 65+ (Fluzone HD) 09/15/2020, 09/21/2021     Influenza Vaccine, 6+MO IM (QUADRIVALENT W/PRESERVATIVES) 09/05/2013     [place] Encephalitis IM 08/16/2010, 09/09/2010, 09/05/2013     Pneumo Conj 13-V (2010&after) 07/20/2015     Pneumococcal 23 valent 07/21/2016     Td (Adult), Adsorbed 06/02/2004     Td,adult,historic,unspecified 06/02/2004, 03/02/2009     Tdap (Adacel,Boostrix) 03/02/2009, 06/29/2022     Typhoid Oral 01/10/2008, 09/05/2013     Zoster vaccine, live 01/07/2015     Immunizations reviewed and up-to-date.    Health Maintenance:   Immunizations reviewed and up-to-date.    Past Medical History:   Past Medical History:   Diagnosis Date     Arthritis Osteoarthritis 2020     Congestive heart failure (H) 2021     Heart disease december 2019     HTN (hypertension)      Hyperlipidemia      Prostate CA (H)    Prior surgeries include prostatectomy for prostate cancer no sign of recurrence.    History of cardiomyopathy with full cardiac evaluation at [facility] improved.    Hypertension and hyperlipidemia.  No history of MI or stroke no history of xanthoma xanthelasma.    Past Surgical History:   Past Surgical History:   Procedure Laterality Date     BIOPSY  1996     COLONOSCOPY  2017     CV CORONARY ANGIOGRAM N/A 12/03/2019    Procedure: Coronary Angiogram;  Surgeon: Sheryl Mccormick MD;  Location: [facility] Cath Lab;  Service: Cardiology     CV LEFT HEART CATHETERIZATION WITHOUT LEFT VENTRICULOGRAM Left 12/03/2019    Procedure: Left Heart Catheterization Without Left Ventriculogram;  Surgeon: Sheryl Mccormick MD;  Location: [facility] Cath Lab;  Service: Cardiology     HIP  "SURGERY       ORTHOPEDIC SURGERY  Hip resurfacing 2010     PROSTATECTOMY  1996      No anesthetic complications from any prior surgery.  Family History:   Family History   Problem Relation Age of Onset     Ovarian Cancer Mother      Other Cancer Mother      Hypertension Father      Cerebrovascular Disease Paternal Grandfather      Diabetes Other         Aunt     Prostate Cancer Brother      Other Cancer Sister      Mother  45 cancer.    Father  83 after a fall had underlying dementia.  1 twin brother with prostate cancer.  2 children well wife well wife retired nurse who was instrumental in design and development of epic electronic health record.  She has had a history of coronary dissection very supportive living.    Exam:  Vitals:/64 (BP Location: Right arm, Patient Position: Sitting, Cuff Size: Adult Large)   Pulse 62   Resp 22   Ht 1.816 m (5' 11.5\")   Wt 87.4 kg (192 lb 11.2 oz)   SpO2 98%   BMI 26.50 kg/m    Chest clear heart tones normal abdomen benign extremities free of edema neck veins nondistended no thyromegaly no scleral icterus appears younger than stated age neuromuscular tone is good he appears healthy and where well wears glasses.  Has a biology degree from [employer] in [employer].  Retired    Assessment and Plan:  Medically acceptable risk for anticipated sinus surgery 2023.  Dr. Maverick Savage.  [facility] [place].  Preoperatively ordered chest x-ray hemogram basic metabolic profile.  No history of anesthetic complications for any prior surgery.  No family history of malignant hyperthermia associated with general anesthesia.    Florentin Bill MD  "

## 2023-03-02 NOTE — LETTER
March 3, 2023      Alanna Bishop  [address] [place] [address]      Resulted Orders   Basic metabolic panel   Result Value Ref Range    Sodium 141 136 - 145 mmol/L    Potassium 4.6 3.4 - 5.3 mmol/L    Chloride 106 98 - 107 mmol/L    Carbon Dioxide (CO2) 25 22 - 29 mmol/L    Anion Gap 10 7 - 15 mmol/L    Urea Nitrogen 13.1 8.0 - 23.0 mg/dL    Creatinine 1.22 (H) 0.67 - 1.17 mg/dL    Calcium 9.9 8.8 - 10.2 mg/dL    Glucose 107 (H) 70 - 99 mg/dL    GFR Estimate 63 >60 mL/min/1.73m2      Comment:      eGFR calculated using 2021 CKD-EPI equation.   CBC with platelets   Result Value Ref Range    WBC Count 6.6 4.0 - 11.0 10e3/uL    RBC Count 5.23 4.40 - 5.90 10e6/uL    Hemoglobin 15.5 13.3 - 17.7 g/dL    Hematocrit 46.3 40.0 - 53.0 %    MCV 89 78 - 100 fL    MCH 29.6 26.5 - 33.0 pg    MCHC 33.5 31.5 - 36.5 g/dL    RDW 13.4 10.0 - 15.0 %    Platelet Count 240 150 - 450 10e3/uL       If you have any questions or concerns, please call the clinic at the number listed above.       Sincerely,      Florentin Bill MD

## 2023-03-14 ENCOUNTER — MYC MEDICAL ADVICE (OUTPATIENT)
Dept: INTERNAL MEDICINE | Facility: CLINIC | Age: 73
End: 2023-03-14
Payer: MEDICARE

## 2023-04-04 ENCOUNTER — TRANSFERRED RECORDS (OUTPATIENT)
Dept: HEALTH INFORMATION MANAGEMENT | Facility: CLINIC | Age: 73
End: 2023-04-04
Payer: MEDICARE

## 2023-04-20 ENCOUNTER — TRANSFERRED RECORDS (OUTPATIENT)
Dept: HEALTH INFORMATION MANAGEMENT | Facility: CLINIC | Age: 73
End: 2023-04-20
Payer: MEDICARE

## 2023-07-05 DIAGNOSIS — E88.810 METABOLIC SYNDROME: ICD-10-CM

## 2023-07-05 RX ORDER — LOVASTATIN 10 MG
TABLET ORAL
Qty: 90 TABLET | Refills: 0 | Status: SHIPPED | OUTPATIENT
Start: 2023-07-05 | End: 2023-08-28

## 2023-07-05 NOTE — TELEPHONE ENCOUNTER
"Last Written Prescription Date:  10/2/22  Last Fill Quantity: 90,  # refills: 2   Last office visit provider:  3/2/23     Requested Prescriptions   Pending Prescriptions Disp Refills     lovastatin (MEVACOR) 10 MG tablet [Pharmacy Med Name: Lovastatin Oral Tablet 10 MG] 90 tablet 0     Sig: Take 1 tablet (10 mg total) by mouth at bedtime.       Statins Protocol Passed - 7/5/2023  1:34 PM        Passed - LDL on file in past 12 months     Recent Labs   Lab Test 08/25/22  0914   LDL 62             Passed - No abnormal creatine kinase in past 12 months     No lab results found.             Passed - Recent (12 mo) or future (30 days) visit within the authorizing provider's specialty     Patient has had an office visit with the authorizing provider or a provider within the authorizing providers department within the previous 12 mos or has a future within next 30 days. See \"Patient Info\" tab in inbasket, or \"Choose Columns\" in Meds & Orders section of the refill encounter.              Passed - Medication is active on med list        Passed - Patient is age 18 or older             Chau Ludwig RN 07/05/23 1:34 PM  "

## 2023-07-12 ENCOUNTER — TRANSFERRED RECORDS (OUTPATIENT)
Dept: HEALTH INFORMATION MANAGEMENT | Facility: CLINIC | Age: 73
End: 2023-07-12
Payer: MEDICARE

## 2023-08-15 DIAGNOSIS — I10 ESSENTIAL HYPERTENSION: ICD-10-CM

## 2023-08-15 RX ORDER — LISINOPRIL 20 MG/1
TABLET ORAL
Qty: 90 TABLET | Refills: 0 | Status: SHIPPED | OUTPATIENT
Start: 2023-08-15 | End: 2023-08-28

## 2023-08-15 RX ORDER — AMLODIPINE BESYLATE 5 MG/1
TABLET ORAL
Qty: 90 TABLET | Refills: 0 | Status: SHIPPED | OUTPATIENT
Start: 2023-08-15 | End: 2023-08-28

## 2023-08-15 NOTE — TELEPHONE ENCOUNTER
"Routing refill request to provider for review/approval because:  Labs out of range:  CR    Last Written Prescription Date:  2/8/2023  Last Fill Quantity: 90,  # refills: 1   Last office visit provider:  3/2/2023     Requested Prescriptions   Pending Prescriptions Disp Refills    amLODIPine (NORVASC) 5 MG tablet [Pharmacy Med Name: amLODIPine Besylate Oral Tablet 5 MG] 90 tablet 0     Sig: Take 1 tablet (5 mg total) by mouth daily.       Calcium Channel Blockers Protocol  Failed - 8/15/2023  9:18 AM        Failed - Normal serum creatinine on file in past 12 months     Recent Labs   Lab Test 03/02/23  0759   CR 1.22*       Ok to refill medication if creatinine is low          Passed - Blood pressure under 140/90 in past 12 months     BP Readings from Last 3 Encounters:   03/02/23 120/64   12/07/22 126/79   10/25/22 122/78                 Passed - Recent (12 mo) or future (30 days) visit within the authorizing provider's specialty     Patient has had an office visit with the authorizing provider or a provider within the authorizing providers department within the previous 12 mos or has a future within next 30 days. See \"Patient Info\" tab in inbasket, or \"Choose Columns\" in Meds & Orders section of the refill encounter.              Passed - Medication is active on med list        Passed - Patient is age 18 or older          Last Written Prescription Date:  2/8/2023  Last Fill Quantity: 90,  # refills: 1   Last office visit provider:  3/2/2023       lisinopril (ZESTRIL) 20 MG tablet [Pharmacy Med Name: Lisinopril Oral Tablet 20 MG] 90 tablet 0     Sig: Take 1 tablet (20 mg total) by mouth daily.       ACE Inhibitors (Including Combos) Protocol Failed - 8/15/2023  9:18 AM        Failed - Normal serum creatinine on file in past 12 months     Recent Labs   Lab Test 03/02/23  0759   CR 1.22*       Ok to refill medication if creatinine is low          Passed - Blood pressure under 140/90 in past 12 months     BP Readings " "from Last 3 Encounters:   03/02/23 120/64   12/07/22 126/79   10/25/22 122/78                 Passed - Recent (12 mo) or future (30 days) visit within the authorizing provider's specialty     Patient has had an office visit with the authorizing provider or a provider within the authorizing providers department within the previous 12 mos or has a future within next 30 days. See \"Patient Info\" tab in inbasket, or \"Choose Columns\" in Meds & Orders section of the refill encounter.              Passed - Medication is active on med list        Passed - Patient is age 18 or older        Passed - Normal serum potassium on file in past 12 months     Recent Labs   Lab Test 03/02/23  0759   POTASSIUM 4.6                  Arely Desouza RN 08/15/23 12:41 PM  "

## 2023-08-28 ENCOUNTER — OFFICE VISIT (OUTPATIENT)
Dept: INTERNAL MEDICINE | Facility: CLINIC | Age: 73
End: 2023-08-28
Payer: MEDICARE

## 2023-08-28 VITALS
HEART RATE: 62 BPM | HEIGHT: 71 IN | WEIGHT: 197 LBS | BODY MASS INDEX: 27.58 KG/M2 | RESPIRATION RATE: 17 BRPM | SYSTOLIC BLOOD PRESSURE: 126 MMHG | DIASTOLIC BLOOD PRESSURE: 82 MMHG

## 2023-08-28 DIAGNOSIS — E88.810 METABOLIC SYNDROME: ICD-10-CM

## 2023-08-28 DIAGNOSIS — Z00.00 ROUTINE GENERAL MEDICAL EXAMINATION AT A HEALTH CARE FACILITY: Primary | ICD-10-CM

## 2023-08-28 DIAGNOSIS — Z12.5 SCREENING FOR PROSTATE CANCER: ICD-10-CM

## 2023-08-28 DIAGNOSIS — Z11.59 NEED FOR HEPATITIS C SCREENING TEST: ICD-10-CM

## 2023-08-28 DIAGNOSIS — I10 ESSENTIAL HYPERTENSION: ICD-10-CM

## 2023-08-28 LAB
ALBUMIN SERPL BCG-MCNC: 4.3 G/DL (ref 3.5–5.2)
ALBUMIN UR-MCNC: NEGATIVE MG/DL
ALP SERPL-CCNC: 96 U/L (ref 40–129)
ALT SERPL W P-5'-P-CCNC: 22 U/L (ref 0–70)
ANION GAP SERPL CALCULATED.3IONS-SCNC: 9 MMOL/L (ref 7–15)
APPEARANCE UR: CLEAR
AST SERPL W P-5'-P-CCNC: 31 U/L (ref 0–45)
BILIRUB SERPL-MCNC: 0.7 MG/DL
BILIRUB UR QL STRIP: NEGATIVE
BUN SERPL-MCNC: 20.6 MG/DL (ref 8–23)
CALCIUM SERPL-MCNC: 10.2 MG/DL (ref 8.8–10.2)
CHLORIDE SERPL-SCNC: 105 MMOL/L (ref 98–107)
CHOLEST SERPL-MCNC: 142 MG/DL
COLOR UR AUTO: YELLOW
CREAT SERPL-MCNC: 1.22 MG/DL (ref 0.67–1.17)
DEPRECATED HCO3 PLAS-SCNC: 25 MMOL/L (ref 22–29)
ERYTHROCYTE [DISTWIDTH] IN BLOOD BY AUTOMATED COUNT: 13.1 % (ref 10–15)
GFR SERPL CREATININE-BSD FRML MDRD: 63 ML/MIN/1.73M2
GLUCOSE SERPL-MCNC: 104 MG/DL (ref 70–99)
GLUCOSE UR STRIP-MCNC: NEGATIVE MG/DL
HCT VFR BLD AUTO: 47 % (ref 40–53)
HDLC SERPL-MCNC: 38 MG/DL
HGB BLD-MCNC: 16.4 G/DL (ref 13.3–17.7)
HGB UR QL STRIP: NEGATIVE
KETONES UR STRIP-MCNC: NEGATIVE MG/DL
LDLC SERPL CALC-MCNC: 84 MG/DL
LEUKOCYTE ESTERASE UR QL STRIP: NEGATIVE
MCH RBC QN AUTO: 31 PG (ref 26.5–33)
MCHC RBC AUTO-ENTMCNC: 34.9 G/DL (ref 31.5–36.5)
MCV RBC AUTO: 89 FL (ref 78–100)
NITRATE UR QL: NEGATIVE
NONHDLC SERPL-MCNC: 104 MG/DL
PH UR STRIP: 7 [PH] (ref 5–8)
PLATELET # BLD AUTO: 241 10E3/UL (ref 150–450)
POTASSIUM SERPL-SCNC: 5 MMOL/L (ref 3.4–5.3)
PROT SERPL-MCNC: 6.9 G/DL (ref 6.4–8.3)
PSA SERPL DL<=0.01 NG/ML-MCNC: <0.01 NG/ML (ref 0–6.5)
RBC # BLD AUTO: 5.29 10E6/UL (ref 4.4–5.9)
SODIUM SERPL-SCNC: 139 MMOL/L (ref 136–145)
SP GR UR STRIP: 1.01 (ref 1–1.03)
TRIGL SERPL-MCNC: 101 MG/DL
TSH SERPL DL<=0.005 MIU/L-ACNC: 2.04 UIU/ML (ref 0.3–4.2)
UROBILINOGEN UR STRIP-ACNC: 0.2 E.U./DL
WBC # BLD AUTO: 7.8 10E3/UL (ref 4–11)

## 2023-08-28 PROCEDURE — 36415 COLL VENOUS BLD VENIPUNCTURE: CPT | Performed by: INTERNAL MEDICINE

## 2023-08-28 PROCEDURE — 80061 LIPID PANEL: CPT | Performed by: INTERNAL MEDICINE

## 2023-08-28 PROCEDURE — 85027 COMPLETE CBC AUTOMATED: CPT | Performed by: INTERNAL MEDICINE

## 2023-08-28 PROCEDURE — 80053 COMPREHEN METABOLIC PANEL: CPT | Performed by: INTERNAL MEDICINE

## 2023-08-28 PROCEDURE — G0103 PSA SCREENING: HCPCS | Performed by: INTERNAL MEDICINE

## 2023-08-28 PROCEDURE — 81003 URINALYSIS AUTO W/O SCOPE: CPT | Performed by: INTERNAL MEDICINE

## 2023-08-28 PROCEDURE — 84443 ASSAY THYROID STIM HORMONE: CPT | Performed by: INTERNAL MEDICINE

## 2023-08-28 PROCEDURE — G0439 PPPS, SUBSEQ VISIT: HCPCS | Performed by: INTERNAL MEDICINE

## 2023-08-28 RX ORDER — LOVASTATIN 10 MG
10 TABLET ORAL AT BEDTIME
Qty: 90 TABLET | Refills: 0 | Status: SHIPPED | OUTPATIENT
Start: 2023-08-28 | End: 2024-01-08

## 2023-08-28 RX ORDER — METOPROLOL SUCCINATE 50 MG/1
50 TABLET, EXTENDED RELEASE ORAL DAILY
Qty: 90 TABLET | Refills: 3 | Status: SHIPPED | OUTPATIENT
Start: 2023-08-28 | End: 2024-09-17

## 2023-08-28 RX ORDER — LISINOPRIL 20 MG/1
20 TABLET ORAL DAILY
Qty: 90 TABLET | Refills: 0 | Status: SHIPPED | OUTPATIENT
Start: 2023-08-28 | End: 2024-09-17

## 2023-08-28 RX ORDER — AMLODIPINE BESYLATE 5 MG/1
5 TABLET ORAL DAILY
Qty: 90 TABLET | Refills: 11 | Status: SHIPPED | OUTPATIENT
Start: 2023-08-28 | End: 2024-09-17

## 2023-08-28 ASSESSMENT — ENCOUNTER SYMPTOMS
SHORTNESS OF BREATH: 0
HEMATURIA: 0
HEADACHES: 0
PARESTHESIAS: 0
HEMATOCHEZIA: 0
ABDOMINAL PAIN: 0
HEARTBURN: 0
MYALGIAS: 0
CONSTIPATION: 0
CHILLS: 0
DIARRHEA: 0
WEAKNESS: 0
JOINT SWELLING: 0
NERVOUS/ANXIOUS: 0
COUGH: 0
PALPITATIONS: 0
SORE THROAT: 0
FEVER: 0
NAUSEA: 0
EYE PAIN: 0
DYSURIA: 0

## 2023-08-28 ASSESSMENT — ACTIVITIES OF DAILY LIVING (ADL): CURRENT_FUNCTION: NO ASSISTANCE NEEDED

## 2023-08-28 ASSESSMENT — PAIN SCALES - GENERAL: PAINLEVEL: NO PAIN (0)

## 2023-08-28 NOTE — PROGRESS NOTES
"SUBJECTIVE:   Alanna is a 73 year old who presents for Preventive Visit.      8/28/2023     8:57 AM   Additional Questions   Roomed by Fede HANSON CMA       Are you in the first 12 months of your Medicare coverage?  No    Healthy Habits:     In general, how would you rate your overall health?  Good    Frequency of exercise:  6-7 days/week    Duration of exercise:  45-60 minutes    Do you usually eat at least 4 servings of fruit and vegetables a day, include whole grains    & fiber and avoid regularly eating high fat or \"junk\" foods?  No    Taking medications regularly:  Yes    Medication side effects:  None    Ability to successfully perform activities of daily living:  No assistance needed    Home Safety:  No safety concerns identified    Hearing Impairment:  No hearing concerns    In the past 6 months, have you been bothered by leaking of urine?  No    In general, how would you rate your overall mental or emotional health?  Excellent    Additional concerns today:  No        Have you ever done Advance Care Planning? (For example, a Health Directive, POLST, or a discussion with a medical provider or your loved ones about your wishes): Yes, patient states has an Advance Care Planning document and will bring a copy to the clinic.    {Hearing Test Done (Optional):730510}   Fall risk  Fallen 2 or more times in the past year?: No  Any fall with injury in the past year?: No    Cognitive Screening   1) Repeat 3 items (Leader, Season, Table)    2) Clock draw: { :60830::\"NORMAL\"}  3) 3 item recall: { :003245}  Results: {MINICOG RESULTS:453750}    Mini-CogTM Copyright SUSHIL Rodriguez. Licensed by the author for use in [facility]; reprinted with permission (caryn@.[facility]). All rights reserved.      Do you have sleep apnea, excessive snoring or daytime drowsiness? : no    Reviewed and updated as needed this visit by clinical staff    Allergies  Meds              Reviewed and updated as needed this visit by Provider           "       Social History     Tobacco Use    Smoking status: Never    Smokeless tobacco: Never    Tobacco comments:     /70 pulse 76 yesterday per wife   Substance Use Topics    Alcohol use: Not Currently     Comment: rare     {Rooming staff  Click this link to complete the Prescreen if response below is not for today's visit  Alcohol Use Prescreen >3 drinks/day or > 7 drinks/week.  If the prescreen question answer is YES, complete the full AUDIT  :166551}        8/28/2023     8:36 AM   Alcohol Use   Prescreen: >3 drinks/day or >7 drinks/week? No   {add AUDIT responses (Optional) (A score of 7 for adult men is an indication of hazardous drinking; a score of 8 or more is an indication of an alcohol use disorder.  A score of 7 or more for adult women is an indication of hazardous drinking or an alchohol use disorder):410470}  Do you have a current opioid prescription? { :213926}  Do you use any other controlled substances or medications that are not prescribed by a provider? {Substance Use :219097}  {Provider  If there are gaps in the social history shown above, please follow the link and refresh the note Link to Social and Substance History :457392}    {Outside tests to abstract? :431357}    {additional problems to add (Optional):455314}    Current providers sharing in care for this patient include: {Rooming staff:  Please update Care Team from storyboard, refresh this note and then delete this statement}  Patient Care Team:  Florentin Bill MD as PCP - General  Florentin Bill MD as Assigned PCP  Alan Granados MD as Assigned Surgical Provider  Natalia Mayberry MD as Assigned Neuroscience Provider    The following health maintenance items are reviewed in Epic and correct as of today:  Health Maintenance   Topic Date Due    HEPATITIS C SCREENING  Never done    ZOSTER IMMUNIZATION (2 of 3) 03/04/2015    AORTIC ANEURYSM SCREENING (SYSTEM ASSIGNED)  Never done    ANNUAL REVIEW OF  ORDERS  " 2023    MEDICARE ANNUAL WELLNESS VISIT  2023    INFLUENZA VACCINE (1) 2023    FALL RISK ASSESSMENT  2024    ADVANCE CARE PLANNING  2025    LIPID  2027    COLORECTAL CANCER SCREENING  2028    DTAP/TDAP/TD IMMUNIZATION (4 - Td or Tdap) 2032    PHQ-2 (once per calendar year)  Completed    Pneumococcal Vaccine: 65+ Years  Completed    COVID-19 Vaccine  Completed    IPV IMMUNIZATION  Aged Out    MENINGITIS IMMUNIZATION  Aged Out     {Chronicprobdata (optional):553722}  {Decision Support (Optional):578010}        Review of Systems   Constitutional:  Negative for chills and fever.   HENT:  Negative for congestion, ear pain, hearing loss and sore throat.    Eyes:  Negative for pain and visual disturbance.   Respiratory:  Negative for cough and shortness of breath.    Cardiovascular:  Negative for chest pain, palpitations and peripheral edema.   Gastrointestinal:  Negative for abdominal pain, constipation, diarrhea, heartburn, hematochezia and nausea.   Genitourinary:  Positive for impotence. Negative for dysuria, genital sores, hematuria and urgency.   Musculoskeletal:  Negative for joint swelling and myalgias.   Skin:  Negative for rash.   Neurological:  Negative for weakness, headaches and paresthesias.   Psychiatric/Behavioral:  Negative for mood changes. The patient is not nervous/anxious.      {ROS COMP (Optional):264424}    OBJECTIVE:   There were no vitals taken for this visit. Estimated body mass index is 26.5 kg/m  as calculated from the following:    Height as of 3/2/23: 1.816 m (5' 11.5\").    Weight as of 3/2/23: 87.4 kg (192 lb 11.2 oz).  Physical Exam  {Exam (Optional) :108928}    {Diagnostic Test Results (Optional):042672}    ASSESSMENT / PLAN:   {Diag Picklist:598075}    {Patient advised of split billing (Optional):615105}      COUNSELING:  {Medicare Counselin}      BMI:   Estimated body mass index is 26.5 kg/m  as calculated from the following:    " "Height as of 3/2/23: 1.816 m (5' 11.5\").    Weight as of 3/2/23: 87.4 kg (192 lb 11.2 oz).   {Weight Management Plan needed for ACO:082225}      He reports that he has never smoked. He has never used smokeless tobacco.      Appropriate preventive services were discussed with this patient, including applicable screening as appropriate for cardiovascular disease, diabetes, osteopenia/osteoporosis, and glaucoma.  As appropriate for age/gender, discussed screening for colorectal cancer, prostate cancer, breast cancer, and cervical cancer. Checklist reviewing preventive services available has been given to the patient.    Reviewed patients plan of care and provided an AVS. The {CarePlan:480919} for Chan meets the Care Plan requirement. This Care Plan has been established and reviewed with the {PATIENT, FAMILY MEMBER, CAREGIVER:458206}.    {Counseling Resources  [place] Preventive Services Task Force  Cholesterol Screening  Health diet/nutrition  Pooled Cohorts Equation Calculator  USDA's MyPlate  ASA Prophylaxis  Lung CA Screening  Osteoporosis prevention/bone health :320633}  {Prostate Cancer Screening  Consider for men 55-69 per guidance from USPSTF :541664}    Florentin Bill MD  [facility]    Identified Health Risks:  {Medicare required documentation of substance and opioid use disorders screening :586389}  "

## 2023-08-28 NOTE — PROGRESS NOTES
Annual Wellness Visit:  Chan Bishop  is a 73 year old male  who presents for an annual wellness visit.  Annual wellness visit and physical examination screen for prostate cancer.  SIDDHARTHA plus PSA.  Physician and patient sharing accomplished.  I do not write this patient opioids.  No other provider or individual provides this patient with opioids.  Provider list includes only this examiner LOLA SAUNDERS  General internist MD.    Cognitive assessment was done today by having the patient draw the face of a clock accurately and remember 3 items..    Labs ordered include hemogram comprehensive metabolic profile lipid panel PSA TSH urinalysis.  Emotional mental health normal.  Functional capacity ADL's and safety also assessed and no deficits or problems.    Advance care planning done.    Falls risk assessment also accomplished.    Cognitive assessment was completed and the current provider this examiner and patient sharing was also completed.  Assessment/Plan:  Annual wellness visit and physical examination.  Screen for prostate cancer with PSA SIDDHARTHA.    Subjective:   Medical History:    Non-smoker    Little or no alcohol allergies include ciprofloxacin metronidazole and sulfa.  Prostate cancer 20 years ago resected open technique no recurrence.  History of left total hip arthroplasty for degenerative arthrosis successful.    Hypertension hyperlipidemia and history of cardiomyopathy uncertain etiology resolved asymptomatic at this juncture.  2 children wife well.  Patient is retired.  1 twin brother with prostate cancer resected no recurrence.  Past Medical History:   Diagnosis Date    Arthritis Osteoarthritis 2020    Congestive heart failure (H) 2021    Heart disease december 2019    HTN (hypertension)     Hyperlipidemia     Prostate CA (H)      Current Outpatient Medications   Medication    amLODIPine (NORVASC) 5 MG tablet    aspirin 81 mg chewable tablet    lisinopril (ZESTRIL) 20 MG tablet    lovastatin (MEVACOR) 10 MG tablet     metoprolol succinate ER (TOPROL XL) 50 MG 24 hr tablet     No current facility-administered medications for this visit.     Immunization History   Administered Date(s) Administered    COVID-19 Bivalent 12+ (Pfizer) 09/06/2022, 04/22/2023    COVID-19 MONOVALENT 12+ (Pfizer) 01/23/2021, 02/12/2021, 09/24/2021    COVID-19 Monovalent 12+ (Pfizer 2022) 03/31/2022    DT (PEDS <7y) 06/02/2004    FLU 6-35 months 08/28/2010    Flu 65+ Years 09/06/2022    Flu, Unspecified 09/15/2020, 09/21/2021    Flu-nasal, Unspecified 09/17/2018    HepA, Unspecified 01/10/2008    Hepatitis A (ADULT 19+) 01/10/2008, 09/02/2009    Hepatitis B, Adult 08/11/2015, 09/11/2015, 02/23/2016    Influenza (H1N1) 12/30/2009    Influenza (High Dose) 3 valent vaccine 09/11/2015, 10/14/2016, 09/30/2017    Influenza (IIV3) PF 09/02/2009    Influenza Vaccine 50-64 or 18-64 w/egg allergy (Flublok) 10/17/2019    Influenza Vaccine 65+ (Fluzone HD) 09/15/2020, 09/21/2021    Influenza Vaccine, 6+MO IM (QUADRIVALENT W/PRESERVATIVES) 09/05/2013    Divehi Encephalitis IM 08/16/2010, 09/09/2010, 09/05/2013    Pneumo Conj 13-V (2010&after) 07/20/2015    Pneumococcal 23 valent 07/21/2016    TDAP (Adacel,Boostrix) 03/02/2009, 06/29/2022    Td (Adult), Adsorbed 06/02/2004    Td,adult,historic,unspecified 06/02/2004, 03/02/2009    Typhoid Oral 01/10/2008, 09/05/2013    Zoster vaccine, live 01/07/2015       Surgical History:  Past Surgical History:   Procedure Laterality Date    BIOPSY  1996    COLONOSCOPY  2017    CV CORONARY ANGIOGRAM N/A 12/03/2019    Procedure: Coronary Angiogram;  Surgeon: Sheryl Mccormick MD;  Location: [facility] Lab;  Service: Cardiology    CV LEFT HEART CATHETERIZATION WITHOUT LEFT VENTRICULOGRAM Left 12/03/2019    Procedure: Left Heart Catheterization Without Left Ventriculogram;  Surgeon: Sheryl Mccormick MD;  Location: [facility] Cath Lab;  Service: Cardiology    HIP SURGERY      ORTHOPEDIC SURGERY  Hip resurfacing 2010     "PROSTATECTOMY  1996        Family History:  1 twin brother with prostate cancer resected no recurrence.  Mother  of lymphoma age 45.    Father  after a fall and underlying dementia age 83.  Wife well retired registered nurse who worked in the leadership role for [employer].  2 children well.    Social History:  Biology major from [employer] in [facility] born and raised in [place].  Retired now previously an executive role.    Health Maintenances:  Physicians reviewed and up-to-date recommend COVID vaccines on a periodic basis to cover new variance plus flu vaccine in the fall I did recommend the shingles vaccine and more information needed for adults for RSV vaccination.  Colonoscopy up-to-date and recommend periodic screening colonoscopy.    Objective:  /82 (BP Location: Right arm, Patient Position: Sitting, Cuff Size: Adult Regular)   Pulse 62   Resp 17   Ht 1.801 m (5' 10.9\")   Wt 89.4 kg (197 lb)   BMI 27.55 kg/m    Easily conversant good spirited intelligent not in acute distress or toxic.  Back straight no severe spine tenderness chest clear heart tones normal abdomen benign no lymphadenopathy appreciated lymph bearing areas no carotid bruits thyromegaly no neck vein distention genital rectal exam negative the prostate was surgically absent there was no evidence for any abnormality on the rectal exam no rectal masses or nodularity no blood in the stool the stool is brown extremities are free of edema cyanosis or clubbing good pulse motor in all 4 extremities head eyes ears nose throat negative he stays active his neuromuscular tone is good he wears glasses and appears younger than his stated age we had a good discussion today.    Florentin Bill MD    Internal MedicineAnswers submitted by the patient for this visit:  Annual Preventive Visit (Submitted on 2023)  Chief Complaint: Annual Exam:  In general, how would you rate your overall physical health?: good  Frequency " "of exercise:: 6-7 days/week  Do you usually eat at least 4 servings of fruit and vegetables a day, include whole grains & fiber, and avoid regularly eating high fat or \"junk\" foods? : No  Taking medications regularly:: Yes  Medication side effects:: None  Activities of Daily Living: no assistance needed  Home safety: no safety concerns identified  Hearing Impairment:: no hearing concerns  In the past 6 months, have you been bothered by leaking of urine?: No  abdominal pain: No  Blood in stool: No  Blood in urine: No  chest pain: No  chills: No  congestion: No  constipation: No  cough: No  diarrhea: No  ear pain: No  eye pain: No  nervous/anxious: No  fever: No  genital sores: No  headaches: No  hearing loss: No  heartburn: No  joint swelling: No  peripheral edema: No  mood changes: No  myalgias: No  nausea: No  dysuria: No  palpitations: No  Skin sensation changes: No  sore throat: No  urgency: No  rash: No  shortness of breath: No  visual disturbance: No  weakness: No  impotence: Yes  In general, how would you rate your overall mental or emotional health?: excellent  Additional concerns today:: No  Exercise outside of work (Submitted on 8/28/2023)  Chief Complaint: Annual Exam:  Duration of exercise:: 45-60 minutes    "

## 2023-10-12 ENCOUNTER — LAB REQUISITION (OUTPATIENT)
Dept: LAB | Facility: HOSPITAL | Age: 73
End: 2023-10-12
Payer: MEDICARE

## 2023-10-12 DIAGNOSIS — R51.9 HEADACHE, UNSPECIFIED: ICD-10-CM

## 2023-10-12 LAB
CRP SERPL-MCNC: 3.1 MG/L
ERYTHROCYTE [SEDIMENTATION RATE] IN BLOOD BY WESTERGREN METHOD: 6 MM/HR (ref 0–20)
PLATELET # BLD AUTO: 237 10E3/UL (ref 150–450)

## 2023-10-12 PROCEDURE — 85652 RBC SED RATE AUTOMATED: CPT | Mod: ORL | Performed by: OPHTHALMOLOGY

## 2023-10-12 PROCEDURE — 86140 C-REACTIVE PROTEIN: CPT | Mod: ORL | Performed by: OPHTHALMOLOGY

## 2023-10-12 PROCEDURE — 85049 AUTOMATED PLATELET COUNT: CPT | Mod: ORL | Performed by: OPHTHALMOLOGY

## 2023-10-12 PROCEDURE — 36415 COLL VENOUS BLD VENIPUNCTURE: CPT | Mod: ORL | Performed by: OPHTHALMOLOGY

## 2024-01-06 DIAGNOSIS — E88.810 METABOLIC SYNDROME: ICD-10-CM

## 2024-01-08 RX ORDER — LOVASTATIN 10 MG
10 TABLET ORAL AT BEDTIME
Qty: 90 TABLET | Refills: 0 | Status: SHIPPED | OUTPATIENT
Start: 2024-01-08 | End: 2024-04-05

## 2024-02-10 NOTE — TELEPHONE ENCOUNTER
[facility] Call Center    Phone Message    May a detailed message be left on voicemail: yes     Reason for Call: Other: Gene from [employer] called for clarification on orders.  Gene states that MRA brain is uaully without contrast, Gene needs clarification on why it needs to be with contrast.  Pt has appt with Shamar tomorrow 11/01/22.    Please call Gene at [phone] to discus further.    Action Taken: Message routed to:  Other: [facility]    Travel Screening: Not Applicable                                                                       Alert and interactive, no focal deficits

## 2024-03-05 ENCOUNTER — TRANSFERRED RECORDS (OUTPATIENT)
Dept: HEALTH INFORMATION MANAGEMENT | Facility: CLINIC | Age: 74
End: 2024-03-05
Payer: MEDICARE

## 2024-03-14 ENCOUNTER — TRANSFERRED RECORDS (OUTPATIENT)
Dept: HEALTH INFORMATION MANAGEMENT | Facility: CLINIC | Age: 74
End: 2024-03-14
Payer: MEDICARE

## 2024-04-05 DIAGNOSIS — E88.810 METABOLIC SYNDROME: ICD-10-CM

## 2024-04-05 RX ORDER — LOVASTATIN 10 MG
10 TABLET ORAL AT BEDTIME
Qty: 90 TABLET | Refills: 0 | Status: SHIPPED | OUTPATIENT
Start: 2024-04-05 | End: 2024-07-05

## 2024-04-12 ENCOUNTER — TRANSFERRED RECORDS (OUTPATIENT)
Dept: HEALTH INFORMATION MANAGEMENT | Facility: CLINIC | Age: 74
End: 2024-04-12
Payer: MEDICARE

## 2024-04-17 ENCOUNTER — TRANSFERRED RECORDS (OUTPATIENT)
Dept: HEALTH INFORMATION MANAGEMENT | Facility: CLINIC | Age: 74
End: 2024-04-17
Payer: MEDICARE

## 2024-04-26 ENCOUNTER — TRANSFERRED RECORDS (OUTPATIENT)
Dept: HEALTH INFORMATION MANAGEMENT | Facility: CLINIC | Age: 74
End: 2024-04-26
Payer: MEDICARE

## 2024-06-04 ENCOUNTER — TRANSFERRED RECORDS (OUTPATIENT)
Dept: HEALTH INFORMATION MANAGEMENT | Facility: CLINIC | Age: 74
End: 2024-06-04
Payer: MEDICARE

## 2024-06-06 ENCOUNTER — OFFICE VISIT (OUTPATIENT)
Dept: FAMILY MEDICINE | Facility: CLINIC | Age: 74
End: 2024-06-06
Payer: MEDICARE

## 2024-06-06 VITALS
OXYGEN SATURATION: 99 % | TEMPERATURE: 98.8 F | DIASTOLIC BLOOD PRESSURE: 72 MMHG | SYSTOLIC BLOOD PRESSURE: 111 MMHG | HEART RATE: 69 BPM

## 2024-06-06 DIAGNOSIS — R05.3 CHRONIC COUGH: Primary | ICD-10-CM

## 2024-06-06 DIAGNOSIS — I49.3 PVC'S (PREMATURE VENTRICULAR CONTRACTIONS): ICD-10-CM

## 2024-06-06 PROCEDURE — 99214 OFFICE O/P EST MOD 30 MIN: CPT | Mod: 25 | Performed by: NURSE PRACTITIONER

## 2024-06-06 PROCEDURE — 94640 AIRWAY INHALATION TREATMENT: CPT | Performed by: NURSE PRACTITIONER

## 2024-06-06 RX ORDER — LEVALBUTEROL TARTRATE 45 UG/1
2 AEROSOL, METERED ORAL EVERY 4 HOURS PRN
Qty: 15 G | Refills: 0 | Status: SHIPPED | OUTPATIENT
Start: 2024-06-06 | End: 2024-09-17

## 2024-06-06 RX ORDER — ALBUTEROL SULFATE 0.83 MG/ML
2.5 SOLUTION RESPIRATORY (INHALATION) ONCE
Status: COMPLETED | OUTPATIENT
Start: 2024-06-06 | End: 2024-06-06

## 2024-06-06 RX ADMIN — ALBUTEROL SULFATE 2.5 MG: 0.83 SOLUTION RESPIRATORY (INHALATION) at 16:34

## 2024-06-06 ASSESSMENT — ENCOUNTER SYMPTOMS: HEARTBURN: 0

## 2024-06-06 NOTE — PATIENT INSTRUCTIONS
Try the xopenex for frequent coughing up to 4x daily     See your doctor for recheck.      Continue the flonase

## 2024-06-06 NOTE — PROGRESS NOTES
Assessment & Plan     Chronic cough  -Improved with test dose of albuterol here in the clinic  - albuterol (PROVENTIL) neb solution 2.5 mg  - levalbuterol (XOPENEX HFA) 45 MCG/ACT inhaler  Dispense: 15 g; Refill: 0  - Optichamber/Spacer Order for DME - ONLY FOR DME    PVC's (premature ventricular contractions)         Focused exam and history done due to COVID-19 pandemic in a walk-in setting.      Patient has had 6 months of chronic irritative cough that feels like mucus stuck in his throat/neck area.    Became worse towards the end of April associated with going to [place]/smog exposure.    Has not been evaluated for this since a vacation.  Did have a CT scan of his sinuses at ENT clinic in February status post septoplasty last year.  This was clear according to the ENT.    Patient has no current sign or symptom of ABRS such as facial tenderness, purulent postnasal drainage or congestion.     Explained possibility of undiagnosed reactive airway, especially given chronic smoke exposure as a child to parents.  Patient did get 1 dose of albuterol and says his symptom was much better and felt like it was much easier to breathe following this.  Patient would like to try similar at home.    Give prescription for Xopenex as he has been getting treatment for frequent PVCs.  Explained how to use with spacer.  Recommend following up with primary care provider.  May need PFTs done eventually.    Come back sooner if worsening, frequent use of inhaler required, shortness of breath or wheezing.              Return in about 2 weeks (around 6/20/2024).    Gabriela Mendoza [facility] is a 73 year old male who presents to clinic today for the following health issues:  Chief Complaint   Patient presents with    Urgent Care     - 6 Mos  - Cough  - Phlegm in the throat  - Congestion in the chest  - Concerned of pneumonia   - Tried Mucinex, Dimeatap, and Cough Drops but has not  "worked for him  - Has tested for COVID; Tested NEG  - Symptoms developed while in [place] 6 mos ago     HPI    Cough ongoing for 6 months.      Worse with vacation at the end of April x 2 weeks.  Neg covid.  Coughing throughout the day.  Noted quite a bit of smog in [place] where they were visiting.    \"Always feel like I have phlegm in the throat.\"  Points to the neck area.    \"Can't get phlegm up.\"  Irritative.     Increased fluids.  Trying Dimetapp.    Septoplasty last year - ENT saw him for this and said sinuses looked perfect around March (CT scan).      No h/o asthma.  Non smoker. Parents were smoker.     No chest tightness.      Has PVCs- seeing cardiologist for this.  They just increased his metoprolol dose.    Uses neti pot frequently.      Uses flonase at night consistently.          Review of Systems   Gastrointestinal:  Negative for heartburn.           Objective    /72   Pulse 69   Temp 98.8  F (37.1  C) (Oral)   SpO2 99%   Physical Exam  Constitutional:       Appearance: Normal appearance.   HENT:      Nose: No congestion or rhinorrhea.      Right Nostril: No occlusion.      Left Nostril: No occlusion.      Right Sinus: No maxillary sinus tenderness.      Left Sinus: No maxillary sinus tenderness.   Eyes:      Conjunctiva/sclera: Conjunctivae normal.   Cardiovascular:      Rate and Rhythm: Normal rate and regular rhythm.      Pulses: Normal pulses.      Heart sounds: Normal heart sounds.      Comments: No skipped beats heard.  Pulmonary:      Effort: Pulmonary effort is normal.      Breath sounds: Normal breath sounds. No wheezing.   Lymphadenopathy:      Cervical: No cervical adenopathy.   Neurological:      Mental Status: He is alert and oriented to person, place, and time.   Psychiatric:         Mood and Affect: Mood normal.        No results found for any visits on 06/06/24.              "

## 2024-06-15 ENCOUNTER — TRANSFERRED RECORDS (OUTPATIENT)
Dept: HEALTH INFORMATION MANAGEMENT | Facility: CLINIC | Age: 74
End: 2024-06-15
Payer: MEDICARE

## 2024-06-26 ENCOUNTER — MYC MEDICAL ADVICE (OUTPATIENT)
Dept: INTERNAL MEDICINE | Facility: CLINIC | Age: 74
End: 2024-06-26
Payer: MEDICARE

## 2024-06-26 VITALS — DIASTOLIC BLOOD PRESSURE: 67 MMHG | SYSTOLIC BLOOD PRESSURE: 112 MMHG

## 2024-06-26 NOTE — TELEPHONE ENCOUNTER
MYC RESPONSE:   Hello, My B/P was elevated because I was not feeling well. I take my B/P often. Today it was 112/67. Thanks in advance for updating my chart with this reading I just gave you. Pat       Updated chart.       Fede Mackay Jr., CMA on 6/26/2024 at 12:21 PM

## 2024-07-05 DIAGNOSIS — E88.810 METABOLIC SYNDROME: ICD-10-CM

## 2024-07-05 RX ORDER — LOVASTATIN 10 MG
10 TABLET ORAL AT BEDTIME
Qty: 90 TABLET | Refills: 0 | Status: SHIPPED | OUTPATIENT
Start: 2024-07-05 | End: 2024-10-03

## 2024-07-18 ENCOUNTER — TRANSFERRED RECORDS (OUTPATIENT)
Dept: HEALTH INFORMATION MANAGEMENT | Facility: CLINIC | Age: 74
End: 2024-07-18
Payer: MEDICARE

## 2024-08-02 NOTE — PROGRESS NOTES
Pre op has been faxed with labs and EKG   no rash/no itching/no dryness/no brittle nails/no pitted nails/no hair loss no rash/no itching/no dryness/no brittle nails/no pitted nails/no hair loss

## 2024-09-14 ENCOUNTER — MYC MEDICAL ADVICE (OUTPATIENT)
Dept: INTERNAL MEDICINE | Facility: CLINIC | Age: 74
End: 2024-09-14
Payer: MEDICARE

## 2024-09-16 ENCOUNTER — TRANSFERRED RECORDS (OUTPATIENT)
Dept: HEALTH INFORMATION MANAGEMENT | Facility: CLINIC | Age: 74
End: 2024-09-16
Payer: MEDICARE

## 2024-09-17 ENCOUNTER — OFFICE VISIT (OUTPATIENT)
Dept: INTERNAL MEDICINE | Facility: CLINIC | Age: 74
End: 2024-09-17
Payer: MEDICARE

## 2024-09-17 VITALS
DIASTOLIC BLOOD PRESSURE: 103 MMHG | OXYGEN SATURATION: 100 % | WEIGHT: 180 LBS | BODY MASS INDEX: 25.77 KG/M2 | RESPIRATION RATE: 12 BRPM | HEIGHT: 70 IN | SYSTOLIC BLOOD PRESSURE: 165 MMHG | HEART RATE: 52 BPM | TEMPERATURE: 97.5 F

## 2024-09-17 DIAGNOSIS — Z00.00 ENCOUNTER FOR MEDICARE ANNUAL WELLNESS EXAM: Primary | ICD-10-CM

## 2024-09-17 DIAGNOSIS — E78.2 MIXED HYPERLIPIDEMIA: ICD-10-CM

## 2024-09-17 DIAGNOSIS — Z85.46 HISTORY OF PROSTATE CANCER: ICD-10-CM

## 2024-09-17 DIAGNOSIS — G24.3 CERVICAL DYSTONIA: ICD-10-CM

## 2024-09-17 DIAGNOSIS — G44.229 CHRONIC TENSION-TYPE HEADACHE, NOT INTRACTABLE: ICD-10-CM

## 2024-09-17 DIAGNOSIS — I10 ESSENTIAL HYPERTENSION: ICD-10-CM

## 2024-09-17 DIAGNOSIS — I42.9 CARDIOMYOPATHY, UNSPECIFIED TYPE (H): ICD-10-CM

## 2024-09-17 DIAGNOSIS — N20.0 KIDNEY STONE: ICD-10-CM

## 2024-09-17 PROBLEM — R06.83 SNORING: Status: RESOLVED | Noted: 2022-05-06 | Resolved: 2024-09-17

## 2024-09-17 PROBLEM — N20.1 URETERIC STONE: Status: ACTIVE | Noted: 2024-05-24

## 2024-09-17 PROBLEM — R07.81 PLEURITIC CHEST PAIN: Status: RESOLVED | Noted: 2019-12-02 | Resolved: 2024-09-17

## 2024-09-17 PROBLEM — M48.061 LUMBAR SPINAL STENOSIS: Status: ACTIVE | Noted: 2022-01-01

## 2024-09-17 PROBLEM — D12.5 BENIGN NEOPLASM OF SIGMOID COLON: Status: ACTIVE | Noted: 2023-03-02

## 2024-09-17 PROBLEM — Z90.79 HISTORY OF RADICAL PROSTATECTOMY: Status: ACTIVE | Noted: 2024-05-24

## 2024-09-17 PROBLEM — G89.29 CHRONIC HEADACHES: Status: ACTIVE | Noted: 2023-09-01

## 2024-09-17 PROBLEM — R51.9 CHRONIC HEADACHES: Status: ACTIVE | Noted: 2023-09-01

## 2024-09-17 LAB
ALBUMIN SERPL BCG-MCNC: 3.9 G/DL (ref 3.5–5.2)
ALP SERPL-CCNC: 130 U/L (ref 40–150)
ALT SERPL W P-5'-P-CCNC: 11 U/L (ref 0–70)
ANION GAP SERPL CALCULATED.3IONS-SCNC: 9 MMOL/L (ref 7–15)
AST SERPL W P-5'-P-CCNC: 19 U/L (ref 0–45)
BILIRUB SERPL-MCNC: 0.5 MG/DL
BUN SERPL-MCNC: 28.6 MG/DL (ref 8–23)
CALCIUM SERPL-MCNC: 10.4 MG/DL (ref 8.8–10.4)
CHLORIDE SERPL-SCNC: 105 MMOL/L (ref 98–107)
CHOLEST SERPL-MCNC: 114 MG/DL
CREAT SERPL-MCNC: 1.15 MG/DL (ref 0.67–1.17)
EGFRCR SERPLBLD CKD-EPI 2021: 67 ML/MIN/1.73M2
ERYTHROCYTE [DISTWIDTH] IN BLOOD BY AUTOMATED COUNT: 14.8 % (ref 10–15)
FASTING STATUS PATIENT QL REPORTED: ABNORMAL
FASTING STATUS PATIENT QL REPORTED: ABNORMAL
GLUCOSE SERPL-MCNC: 88 MG/DL (ref 70–99)
HBA1C MFR BLD: 5.6 % (ref 0–5.6)
HCO3 SERPL-SCNC: 26 MMOL/L (ref 22–29)
HCT VFR BLD AUTO: 44.6 % (ref 40–53)
HDLC SERPL-MCNC: 31 MG/DL
HGB BLD-MCNC: 14.4 G/DL (ref 13.3–17.7)
LDLC SERPL CALC-MCNC: 69 MG/DL
MCH RBC QN AUTO: 27.4 PG (ref 26.5–33)
MCHC RBC AUTO-ENTMCNC: 32.3 G/DL (ref 31.5–36.5)
MCV RBC AUTO: 85 FL (ref 78–100)
NONHDLC SERPL-MCNC: 83 MG/DL
PLATELET # BLD AUTO: 298 10E3/UL (ref 150–450)
POTASSIUM SERPL-SCNC: 4.8 MMOL/L (ref 3.4–5.3)
PROT SERPL-MCNC: 7.5 G/DL (ref 6.4–8.3)
RBC # BLD AUTO: 5.26 10E6/UL (ref 4.4–5.9)
SODIUM SERPL-SCNC: 140 MMOL/L (ref 135–145)
TRIGL SERPL-MCNC: 68 MG/DL
WBC # BLD AUTO: 10 10E3/UL (ref 4–11)

## 2024-09-17 PROCEDURE — 85027 COMPLETE CBC AUTOMATED: CPT | Performed by: INTERNAL MEDICINE

## 2024-09-17 PROCEDURE — 36415 COLL VENOUS BLD VENIPUNCTURE: CPT | Performed by: INTERNAL MEDICINE

## 2024-09-17 PROCEDURE — 99214 OFFICE O/P EST MOD 30 MIN: CPT | Mod: 25 | Performed by: INTERNAL MEDICINE

## 2024-09-17 PROCEDURE — 80053 COMPREHEN METABOLIC PANEL: CPT | Performed by: INTERNAL MEDICINE

## 2024-09-17 PROCEDURE — 80061 LIPID PANEL: CPT | Performed by: INTERNAL MEDICINE

## 2024-09-17 PROCEDURE — 83036 HEMOGLOBIN GLYCOSYLATED A1C: CPT | Performed by: INTERNAL MEDICINE

## 2024-09-17 PROCEDURE — G0439 PPPS, SUBSEQ VISIT: HCPCS | Performed by: INTERNAL MEDICINE

## 2024-09-17 RX ORDER — LISINOPRIL 30 MG/1
30 TABLET ORAL DAILY
Qty: 90 TABLET | Refills: 3 | Status: SHIPPED | OUTPATIENT
Start: 2024-09-17

## 2024-09-17 RX ORDER — METOPROLOL SUCCINATE 50 MG/1
50 TABLET, EXTENDED RELEASE ORAL 2 TIMES DAILY
Qty: 90 TABLET | Refills: 3 | Status: SHIPPED | OUTPATIENT
Start: 2024-09-17 | End: 2024-09-20

## 2024-09-17 RX ORDER — FLUTICASONE PROPIONATE 50 MCG
1 SPRAY, SUSPENSION (ML) NASAL DAILY
COMMUNITY

## 2024-09-17 RX ORDER — MULTIVITAMIN WITH IRON
1 TABLET ORAL DAILY
COMMUNITY

## 2024-09-17 ASSESSMENT — PAIN SCALES - GENERAL: PAINLEVEL: NO PAIN (0)

## 2024-09-17 NOTE — ASSESSMENT & PLAN NOTE
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 Follows with Dr. Saldana at [facility]. Last TTE 8/2023 EF 56%. Euvolemic on exam today.   - Continue excellent lifestyle modifications  - Continue cardiology f/up, next scheduled December

## 2024-09-17 NOTE — PATIENT INSTRUCTIONS
Bring BP cuff to next visit to ensure it is giving accurate numbers.     Get flu shot at pharmacy     Patient Education   Preventive Care Advice   This is general advice given by our system to help you stay healthy. However, your care team may have specific advice just for you. Please talk to your care team about your preventive care needs.  Nutrition  Eat 5 or more servings of fruits and vegetables each day.  Try wheat bread, brown rice and whole grain pasta (instead of white bread, rice, and pasta).  Get enough calcium and vitamin D. Check the label on foods and aim for 100% of the RDA (recommended daily allowance).  Lifestyle  Exercise at least 150 minutes each week  (30 minutes a day, 5 days a week).  Do muscle strengthening activities 2 days a week. These help control your weight and prevent disease.  No smoking.  Wear sunscreen to prevent skin cancer.  Have a dental exam and cleaning every 6 months.  Yearly exams  See your health care team every year to talk about:  Any changes in your health.  Any medicines your care team has prescribed.  Preventive care, family planning, and ways to prevent chronic diseases.  Shots (vaccines)   HPV shots (up to age 26), if you've never had them before.  Hepatitis B shots (up to age 59), if you've never had them before.  COVID-19 shot: Get this shot when it's due.  Flu shot: Get a flu shot every year.  Tetanus shot: Get a tetanus shot every 10 years.  Pneumococcal, hepatitis A, and RSV shots: Ask your care team if you need these based on your risk.  Shingles shot (for age 50 and up)  General health tests  Diabetes screening:  Starting at age 35, Get screened for diabetes at least every 3 years.  If you are younger than age 35, ask your care team if you should be screened for diabetes.  Cholesterol test: At age 39, start having a cholesterol test every 5 years, or more often if advised.  Bone density scan (DEXA): At age 50, ask your care team if you should have this scan for  osteoporosis (brittle bones).  Hepatitis C: Get tested at least once in your life.  STIs (sexually transmitted infections)  Before age 24: Ask your care team if you should be screened for STIs.  After age 24: Get screened for STIs if you're at risk. You are at risk for STIs (including HIV) if:  You are sexually active with more than one person.  You don't use condoms every time.  You or a partner was diagnosed with a sexually transmitted infection.  If you are at risk for HIV, ask about PrEP medicine to prevent HIV.  Get tested for HIV at least once in your life, whether you are at risk for HIV or not.  Cancer screening tests  Cervical cancer screening: If you have a cervix, begin getting regular cervical cancer screening tests starting at age 21.  Breast cancer scan (mammogram): If you've ever had breasts, begin having regular mammograms starting at age 40. This is a scan to check for breast cancer.  Colon cancer screening: It is important to start screening for colon cancer at age 45.  Have a colonoscopy test every 10 years (or more often if you're at risk) Or, ask your provider about stool tests like a FIT test every year or Cologuard test every 3 years.  To learn more about your testing options, visit:   .  For help making a decision, visit:   https://bit.ly/jz11586.  Prostate cancer screening test: If you have a prostate, ask your care team if a prostate cancer screening test (PSA) at age 55 is right for you.  Lung cancer screening: If you are a current or former smoker ages 50 to 80, ask your care team if ongoing lung cancer screenings are right for you.  For informational purposes only. Not to replace the advice of your health care provider. Copyright   2023 [employer]. All rights reserved. Clinically reviewed by the [facility] Transitions Program. Propertybase 197928 - REV 01/24.  Learning About Activities of Daily Living  What are activities of daily living?     Activities of daily living  (ADLs) are the basic self-care tasks you do every day. These include eating, bathing, dressing, and moving around.  As you age, and if you have health problems, you may find that it's harder to do some of these tasks. If so, your doctor can suggest ideas that may help.  To measure what kind of help you may need, your doctor will ask how well you are able to do ADLs. Let your doctor know if there are any tasks that you are having trouble doing. This is an important first step to getting help. And when you have the help you need, you can stay as independent as possible.  How will a doctor assess your ADLs?  Asking about ADLs is part of a routine health checkup your doctor will likely do as you age. Your health check might be done in a doctor's office, in your home, or at a hospital. The goal is to find out if you are having any problems that could make it hard to care for yourself or that make it unsafe for you to be on your own.  To measure your ADLs, your doctor will ask how hard it is for you to do routine tasks. Your doctor may also want to know if you have changed the way you do a task because of a health problem. Your doctor may watch how you:  Walk back and forth.  Keep your balance while you stand or walk.  Move from sitting to standing or from a bed to a chair.  Button or unbutton a shirt or sweater.  Remove and put on your shoes.  It's common to feel a little worried or anxious if you find you can't do all the things you used to be able to do. Talking with your doctor about ADLs is a way to make sure you're as safe as possible and able to care for yourself as well as you can. You may want to bring a caregiver, friend, or family member to your checkup. They can help you talk to your doctor.  Follow-up care is a key part of your treatment and safety. Be sure to make and go to all appointments, and call your doctor if you are having problems. It's also a good idea to know your test results and keep a list of  the medicines you take.  Current as of: October 24, 2023  Content Version: 14.1    1253-2168 [employer].   Care instructions adapted under license by your healthcare professional. If you have questions about a medical condition or this instruction, always ask your healthcare professional. [employer] disclaims any warranty or liability for your use of this information.    Hearing Loss: Care Instructions  Overview     Hearing loss is a sudden or slow decrease in how well you hear. It can range from slight to profound. Permanent hearing loss can occur with aging. It also can happen when you are exposed long-term to loud noise. Examples include listening to loud music, riding motorcycles, or being around other loud machines.  Hearing loss can affect your work and home life. It can make you feel lonely or depressed. You may feel that you have lost your independence. But hearing aids and other devices can help you hear better and feel connected to others.  Follow-up care is a key part of your treatment and safety. Be sure to make and go to all appointments, and call your doctor if you are having problems. It's also a good idea to know your test results and keep a list of the medicines you take.  How can you care for yourself at home?  Avoid loud noises whenever possible. This helps keep your hearing from getting worse.  Always wear hearing protection around loud noises.  Wear a hearing aid as directed.  A professional can help you pick a hearing aid that will work best for you.  You can also get hearing aids over the counter for mild to moderate hearing loss.  Have hearing tests as your doctor suggests. They can show whether your hearing has changed. Your hearing aid may need to be adjusted.  Use other devices as needed. These may include:  Telephone amplifiers and hearing aids that can connect to a television, stereo, radio, or microphone.  Devices that use lights or vibrations. These alert  "you to the doorbell, a ringing telephone, or a baby monitor.  Television closed-captioning. This shows the words at the bottom of the screen. Most new TVs can do this.  TTY (text telephone). This lets you type messages back and forth on the telephone instead of talking or listening. These devices are also called TDD. When messages are typed on the keyboard, they are sent over the phone line to a receiving TTY. The message is shown on a monitor.  Use text messaging, social media, and email if it is hard for you to communicate by telephone.  Try to learn a listening technique called speechreading. It is not lipreading. You pay attention to people's gestures, expressions, posture, and tone of voice. These clues can help you understand what a person is saying. Face the person you are talking to, and have them face you. Make sure the lighting is good. You need to see the other person's face clearly.  Think about counseling if you need help to adjust to your hearing loss.  When should you call for help?  Watch closely for changes in your health, and be sure to contact your doctor if:    You think your hearing is getting worse.     You have new symptoms, such as dizziness or nausea.   Where can you learn more?  Go to https://www.[email or web address].net/patiented  Enter R798 in the search box to learn more about \"Hearing Loss: Care Instructions.\"  Current as of: September 27, 2023               Content Version: 14.0    5576-2567 [employer].   Care instructions adapted under license by your healthcare professional. If you have questions about a medical condition or this instruction, always ask your healthcare professional. [employer] disclaims any warranty or liability for your use of this information.      Bladder Training: Care Instructions  Your Care Instructions     Bladder training is used to treat urge incontinence and stress incontinence. Urge incontinence means that the need to urinate comes on so " fast that you can't get to a toilet in time. Stress incontinence means that you leak urine because of pressure on your bladder. For example, it may happen when you laugh, cough, or lift something heavy.  Bladder training can increase how long you can wait before you have to urinate. It can also help your bladder hold more urine. And it can give you better control over the urge to urinate.  It is important to remember that bladder training takes a few weeks to a few months to make a difference. You may not see results right away, but don't give up.  Follow-up care is a key part of your treatment and safety. Be sure to make and go to all appointments, and call your doctor if you are having problems. It's also a good idea to know your test results and keep a list of the medicines you take.  How can you care for yourself at home?  Work with your doctor to come up with a bladder training program that is right for you. You may use one or more of the following methods.  Delayed urination  In the beginning, try to keep from urinating for 5 minutes after you first feel the need to go.  While you wait, take deep, slow breaths to relax. Kegel exercises can also help you delay the need to go to the bathroom.  After some practice, when you can easily wait 5 minutes to urinate, try to wait 10 minutes before you urinate.  Slowly increase the waiting period until you are able to control when you have to urinate.  Scheduled urination  Empty your bladder when you first wake up in the morning.  Schedule times throughout the day when you will urinate.  Start by going to the bathroom every hour, even if you don't need to go.  Slowly increase the time between trips to the bathroom.  When you have found a schedule that works well for you, keep doing it.  If you wake up during the night and have to urinate, do it. Apply your schedule to waking hours only.  Kegel exercises  These tighten and strengthen pelvic muscles, which can help you  "control the flow of urine. (If doing these exercises causes pain, stop doing them and talk with your doctor.) To do Kegel exercises:  Squeeze your muscles as if you were trying not to pass gas. Or squeeze your muscles as if you were stopping the flow of urine. Your belly, legs, and buttocks shouldn't move.  Hold the squeeze for 3 seconds, then relax for 5 to 10 seconds.  Start with 3 seconds, then add 1 second each week until you are able to squeeze for 10 seconds.  Repeat the exercise 10 times a session. Do 3 to 8 sessions a day.  When should you call for help?  Watch closely for changes in your health, and be sure to contact your doctor if:    Your incontinence is getting worse.     You do not get better as expected.   Where can you learn more?  Go to https://www.[email or web address].net/patiented  Enter V684 in the search box to learn more about \"Bladder Training: Care Instructions.\"  Current as of: November 15, 2023               Content Version: 14.0    2352-4944 [email or web address].   Care instructions adapted under license by your healthcare professional. If you have questions about a medical condition or this instruction, always ask your healthcare professional. [email or web address] disclaims any warranty or liability for your use of this information.         "

## 2024-09-17 NOTE — PROGRESS NOTES
Preventive Care Visit  [facility] Lubna Sadler MD, Internal Medicine  Sep 17, 2024      Assessment & Plan   Problem List Items Addressed This Visit          Nervous and Auditory    Chronic headaches     Likely due to C-spine issues. Following closely with Dr. Zamorano at [facility].          Relevant Medications    metoprolol succinate ER (TOPROL XL) 50 MG 24 hr tablet    Cervical dystonia     Following with Dr. Zamorano at [facility]. Has had botox injections, PT and one MBB, next scheduled soon.             Circulatory    Essential hypertension     Above goal in clinic today but checks daily at home and is always 120s/80s on current regimen at home.   - For now, continue lisinopril 30 mg and metop XL 50 BID  - Continue to watch BP at home, let me or cardiology know if elevated there, would likely increase lisinopril   - Continue to work on low salt diet         Relevant Medications    lisinopril (ZESTRIL) 30 MG tablet    metoprolol succinate ER (TOPROL XL) 50 MG 24 hr tablet    Cardiomyopathy (H)     Developed in 2019 after cardiomyelitis. Follows with Dr. Saldana at [facility]. Last TTE 8/2023 EF 56%. Euvolemic on exam today.   - Continue excellent lifestyle modifications  - Continue cardiology f/up, next scheduled December            Urinary    Kidney stone     Follows annually with Dr. Allen. Last cysto 6/2024.             Other    History of prostate cancer     S/p prostatectomy 1996. Negative PSA since, most recently checked with Dr. Allen 5/2024.          Encounter for Medicare annual wellness exam - Primary     We discussed healthy lifestyle, nutrition, cardiovascular risk reduction, self care, safety, sunscreen, and timing of cancer screening.  Health maintenance screening and immunizations reviewed with the patient.    - Will update labs today  - Next colonoscopy due 2/2028  - PSA annually with Dr. Allen   - No AAA on CT A/P 8/2023  - Continue excellent healthy lifestyle  "  - Follow up yearly for the annual physical.         Relevant Orders    Comprehensive metabolic panel    CBC with platelets (Completed)    Hemoglobin A1c (Completed)     Other Visit Diagnoses       Mixed hyperlipidemia        Relevant Orders    Lipid panel reflex to direct LDL Fasting             Patient has been advised of split billing requirements and indicates understanding: Yes         BMI  Estimated body mass index is 26.01 kg/m  as calculated from the following:    Height as of this encounter: 1.772 m (5' 9.75\").    Weight as of this encounter: 81.6 kg (180 lb).   Weight management plan: Discussed healthy diet and exercise guidelines    Counseling  Appropriate preventive services were addressed with this patient via screening, questionnaire, or discussion as appropriate for fall prevention, nutrition, physical activity, Tobacco-use cessation, social engagement, weight loss and cognition.  Checklist reviewing preventive services available has been given to the patient.  Reviewed patient's diet, addressing concerns and/or questions.   Patient reported safety concerns were addressed today.The patient was provided with written information regarding signs of hearing loss.   Information on urinary incontinence and treatment options given to patient.     FUTURE APPOINTMENTS:       - Follow-up for annual visit or as needed      Subjective   Pat is a 74 year old, presenting for the following:  Physical (AWV) and Headache        9/17/2024     8:55 AM   Additional Questions   Roomed by TENZIN Ortega   Accompanied by VIN         Health Care Directive  Patient does not have a Health Care Directive or Living Will: Patient states has Advance Directive and will bring in a copy to clinic.    HPI    Pat is here for AWV today. Also would like to establish care, wife also a patient of mine.     Headaches: Working with Dr. Zamorano through [facility] for neck issues that leads to headaches. Got botox 7/18/24. Had one MBB that failed, " another coming up, possible RFA after.     HLD: lovastatin 10 mg daily. Does also take baby ASA    HTN / cardimyopathy:  current regimen is lisinopril 30, metop XL 50 BID. Medications have been adjusted recently with Dr. Saldana at the [facility].  Has been having PVCs recently that they have been making changes for, current regimen seems to be doing well.   - Home blood pressures 120s/80s, checks daily. Elevated in clinic today.     Had septoplasty with Dr. Savage at [facility] 2023, now flonase just PRN    Prostate cancer at 47 s/p prostatectomy. Follows annually with Dr. Allen.     Bladder cancer: S/p surgical resection. Negative cystoscopy 6/2024.     HCM: colonoscopy 2/2023, repeat 5 years. Last PSA neg 5/28/24.     Walks 1-2 miles a day and watches diet. Has lost 20 lbs with healthy diet changes in last few years.         9/14/2024   General Health   How would you rate your overall physical health? (!) FAIR   Feel stress (tense, anxious, or unable to sleep) To some extent      (!) STRESS CONCERN      9/14/2024   Nutrition   Diet: Low salt            9/14/2024   Exercise   Days per week of moderate/strenous exercise 7 days   Average minutes spent exercising at this level 30 min            9/14/2024   Social Factors   Frequency of gathering with friends or relatives Once a week   Worry food won't last until get money to buy more No   Food not last or not have enough money for food? No   Do you have housing? (Housing is defined as stable permanent housing and does not include staying ouside in a car, in a tent, in an abandoned building, in an overnight shelter, or couch-surfing.) Yes   Are you worried about losing your housing? No   Lack of transportation? No   Unable to get utilities (heat,electricity)? No            9/14/2024   Fall Risk   Fallen 2 or more times in the past year? No   Trouble with walking or balance? No             9/14/2024   Activities of Daily Living- Home Safety   Needs help with the  following daily activites None of the above   Safety concerns in the home No grab bars in the bathroom            9/14/2024   Dental   Dentist two times every year? Yes            9/14/2024   Hearing Screening   Hearing concerns? (!) I NEED TO ASK PEOPLE TO SPEAK UP OR REPEAT THEMSELVES.    (!) IT'S HARD TO FOLLOW A CONVERSATION IN A NOISY RESTAURANT OR CROWDED ROOM.    (!) TROUBLE UNDERSTANDING SOFT OR WHISPERED SPEECH.       Multiple values from one day are sorted in reverse-chronological order         9/14/2024   Driving Risk Screening   Patient/family members have concerns about driving No            9/14/2024   General Alertness/Fatigue Screening   Have you been more tired than usual lately? No            9/14/2024   Urinary Incontinence Screening   Bothered by leaking urine in past 6 months Yes            9/14/2024   TB Screening   Were you born outside of the [place]? No            Today's PHQ-2 Score:       9/16/2024     8:50 AM   PHQ-2 ( 1999 [employer])   Q1: Little interest or pleasure in doing things 1   Q2: Feeling down, depressed or hopeless 1   PHQ-2 Score 2   Q1: Little interest or pleasure in doing things Several days   Q2: Feeling down, depressed or hopeless Several days   PHQ-2 Score 2           9/14/2024   Substance Use   Alcohol more than 3/day or more than 7/wk No   Do you have a current opioid prescription? No   How severe/bad is pain from 1 to 10? 5/10   Do you use any other substances recreationally? No        Social History     Tobacco Use    Smoking status: Never     Passive exposure: Never    Smokeless tobacco: Never    Tobacco comments:     /70 pulse 76 yesterday per wife   Vaping Use    Vaping status: Never Used   Substance Use Topics    Alcohol use: Not Currently     Comment: rare    Drug use: No           9/14/2024   AAA Screening   Family history of Abdominal Aortic Aneurysm (AAA)? (!) YES       ASCVD Risk   The 10-year ASCVD risk score (Stu GODFREY, et al., 2019) is: 38%     Values used to calculate the score:      Age: 74 years      Sex: Male      Is Non- : No      Diabetic: No      Tobacco smoker: No      Systolic Blood Pressure: 165 mmHg      Is BP treated: Yes      HDL Cholesterol: 38 mg/dL      Total Cholesterol: 142 mg/dL      Reviewed and updated as needed this visit by Provider   Tobacco  Allergies  Meds  Problems  Med Hx  Surg Hx  Fam Hx     Sexual Activity          Past Medical History:   Diagnosis Date    Arthritis Osteoarthritis 2020    Congestive heart failure (H) 2021    Heart disease december 2019    HTN (hypertension)     Hyperlipidemia     Prostate CA (H)      Past Surgical History:   Procedure Laterality Date    BIOPSY  1996    COLONOSCOPY  2017    CV CORONARY ANGIOGRAM N/A 12/03/2019    Procedure: Coronary Angiogram;  Surgeon: Sheryl Mccormick MD;  Location: [facility];  Service: Cardiology    CV LEFT HEART CATHETERIZATION WITHOUT LEFT VENTRICULOGRAM Left 12/03/2019    Procedure: Left Heart Catheterization Without Left Ventriculogram;  Surgeon: Sheryl Mccormick MD;  Location: [facility];  Service: Cardiology    ENT SURGERY  2023    Septoplasty    HIP SURGERY      ORTHOPEDIC SURGERY  Hip resurfacing 2010    PROSTATECTOMY  01/01/1996     Current providers sharing in care for this patient include:  Patient Care Team:  Taniya Sadler MD as PCP - General (Internal Medicine)  Florentin Bill MD as Assigned PCP    The following health maintenance items are reviewed in Epic and correct as of today:  Health Maintenance   Topic Date Due    HEPATITIS C SCREENING  Never done    ANNUAL REVIEW OF HM ORDERS  08/25/2023    LIPID  08/28/2024    INFLUENZA VACCINE (1) 09/01/2024    MEDICARE ANNUAL WELLNESS VISIT  09/17/2025    FALL RISK ASSESSMENT  09/17/2025    GLUCOSE  08/28/2026    COLORECTAL CANCER SCREENING  02/28/2028    ADVANCE CARE PLANNING  09/17/2029    DTAP/TDAP/TD IMMUNIZATION (4 - Td or Tdap) 06/29/2032     "PHQ-2 (once per calendar year)  Completed    Pneumococcal Vaccine: 65+ Years  Completed    ZOSTER IMMUNIZATION  Completed    RSV VACCINE  Completed    COVID-19 Vaccine  Completed    HPV IMMUNIZATION  Aged Out    MENINGITIS IMMUNIZATION  Aged Out    RSV MONOCLONAL ANTIBODY  Aged Out     Review of Systems  Constitutional, neuro, ENT, endocrine, pulmonary, cardiac, gastrointestinal, genitourinary, musculoskeletal, integument and psychiatric systems are negative, except as otherwise noted.     Objective    Exam  BP (!) 165/103 (BP Location: Right arm, Patient Position: Sitting, Cuff Size: Adult Regular)   Pulse 52   Temp 97.5  F (36.4  C) (Oral)   Resp 12   Ht 1.772 m (5' 9.75\")   Wt 81.6 kg (180 lb)   SpO2 100%   BMI 26.01 kg/m     Estimated body mass index is 26.01 kg/m  as calculated from the following:    Height as of this encounter: 1.772 m (5' 9.75\").    Weight as of this encounter: 81.6 kg (180 lb).    Physical Exam  GENERAL: alert and no distress  EYES: Eyes grossly normal to inspection and conjunctivae and sclerae normal  HENT: ear canals and TM's normal, nose and mouth without ulcers or lesions  NECK: no adenopathy, no asymmetry, masses, or scars  RESP: lungs clear to auscultation - no rales, rhonchi or wheezes  CV: regular rate and rhythm, normal S1 S2, no S3 or S4, no murmur, click or rub, no peripheral edema  ABDOMEN: soft, nontender, no hepatosplenomegaly, no masses and bowel sounds normal  MS: no gross musculoskeletal defects noted, no edema  SKIN: no suspicious lesions or rashes on exposed skin; some solar lentigines on b/l UEs  NEURO: Normal strength and tone, mentation intact and speech normal  PSYCH: mentation appears normal, affect normal/bright         9/17/2024   Mini Cog   Clock Draw Score 2 Normal   3 Item Recall 3 objects recalled   Mini Cog Total Score 5                 Signed Electronically by: Taniya Sadler MD    "

## 2024-09-17 NOTE — ASSESSMENT & PLAN NOTE
Following with Dr. Zamorano at [facility]. Has had botox injections, PT and one MBB, next scheduled soon.

## 2024-09-17 NOTE — ASSESSMENT & PLAN NOTE
We discussed healthy lifestyle, nutrition, cardiovascular risk reduction, self care, safety, sunscreen, and timing of cancer screening.  Health maintenance screening and immunizations reviewed with the patient.    - Will update labs today  - Next colonoscopy due 2/2028  - PSA annually with Dr. Allen   - No AAA on CT A/P 8/2023  - Continue excellent healthy lifestyle   - Follow up yearly for the annual physical.

## 2024-09-17 NOTE — ASSESSMENT & PLAN NOTE
Above goal in clinic today but checks daily at home and is always 120s/80s on current regimen at home.   - For now, continue lisinopril 30 mg and metop XL 50 BID  - Continue to watch BP at home, let me or cardiology know if elevated there, would likely increase lisinopril   - Continue to work on low salt diet

## 2024-09-18 ENCOUNTER — TELEPHONE (OUTPATIENT)
Dept: INTERNAL MEDICINE | Facility: CLINIC | Age: 74
End: 2024-09-18

## 2024-09-18 ENCOUNTER — ALLIED HEALTH/NURSE VISIT (OUTPATIENT)
Dept: FAMILY MEDICINE | Facility: CLINIC | Age: 74
End: 2024-09-18
Payer: MEDICARE

## 2024-09-18 VITALS — SYSTOLIC BLOOD PRESSURE: 132 MMHG | DIASTOLIC BLOOD PRESSURE: 74 MMHG

## 2024-09-18 DIAGNOSIS — I10 ESSENTIAL HYPERTENSION: Primary | ICD-10-CM

## 2024-09-18 PROCEDURE — 99207 PR NO CHARGE NURSE ONLY: CPT

## 2024-09-18 NOTE — PROGRESS NOTES
I met with Chan Bishop at the request of patient to recheck his blood pressure.  Blood pressure medications on the med list were reviewed with patient.    Patient has taken all medications as per usual regimen: Yes  Patient reports tolerating them without any issues or concerns: Yes    There were no vitals filed for this visit.    Blood pressure was taken, previous encounter was reviewed, recorded blood pressure below 140/90.  Patient was discharged and the note will be sent to the provider for final review.      Fede Mackay Jr., CMA on 9/18/2024 at 2:01 PM

## 2024-09-18 NOTE — TELEPHONE ENCOUNTER
Pt arrives to clinic today to calibrate BP machine.     Pt went to [facility] to get BP read: 166/81      BP at todays visit: 132/74      They are wondering if they are needing a new machine since it is reading low. No machine was brought with pt for todays visit.       Writer recommended getting a new machine if current one if having trouble getting accurate reading. Writer did advise that sometimes home BP machines are 10pts below/above true BP reading. Suggested a DME order be placed for a new machine, or cuff, if able. That order is conner'd up. Pts spouse unsure if it will be covered, though, due to ins. Spouse states she was a former nurse, and will check manual BP if cuff is purchased. They may try a medical supply store for this.       Spouse wondering if pt should be placed back on amlodipine.Pt was on this for awhile previously at 5mg.

## 2024-09-19 NOTE — TELEPHONE ENCOUNTER
I signed order for new cuff for them.     With our blood pressure being in normal range at 132/74, would not recommend any medication changes at this time.

## 2024-09-20 DIAGNOSIS — I10 ESSENTIAL HYPERTENSION: ICD-10-CM

## 2024-09-20 RX ORDER — METOPROLOL SUCCINATE 50 MG/1
50 TABLET, EXTENDED RELEASE ORAL 2 TIMES DAILY
Qty: 180 TABLET | Refills: 3 | Status: SHIPPED | OUTPATIENT
Start: 2024-09-20

## 2024-09-20 NOTE — TELEPHONE ENCOUNTER
FAX [facility] - 90 Day Quantity Request    metoprolol succinate ER (TOPROL XL) 50 MG 24 hr tablet 90 tablet 3 9/17/2024 -- No   Sig - Route: Take 1 tablet (50 mg) by mouth 2 times daily. - Oral     Last Visit with PCP = 09/17/24    Orig QTY: 90    Requested QTY: 180

## 2024-09-22 ENCOUNTER — MYC MEDICAL ADVICE (OUTPATIENT)
Dept: INTERNAL MEDICINE | Facility: CLINIC | Age: 74
End: 2024-09-22
Payer: MEDICARE

## 2024-10-03 DIAGNOSIS — E88.810 METABOLIC SYNDROME: ICD-10-CM

## 2024-10-03 RX ORDER — LOVASTATIN 10 MG/1
10 TABLET ORAL AT BEDTIME
Qty: 90 TABLET | Refills: 2 | Status: SHIPPED | OUTPATIENT
Start: 2024-10-03

## 2024-10-03 NOTE — TELEPHONE ENCOUNTER
FAX [facility] Refill Request      Disp Refills Start End ALEIDA    lovastatin (MEVACOR) 10 MG tablet 90 tablet 0 7/5/2024 -- No   Sig - Route: Take 1 tablet (10 mg total) by mouth at bedtime - Oral   Sent to pharmacy as: Lovastatin 10 MG Oral Tablet (MEVACOR)

## 2024-12-26 ENCOUNTER — TRANSFERRED RECORDS (OUTPATIENT)
Dept: HEALTH INFORMATION MANAGEMENT | Facility: CLINIC | Age: 74
End: 2024-12-26
Payer: MEDICARE

## 2025-03-04 ENCOUNTER — MYC MEDICAL ADVICE (OUTPATIENT)
Dept: INTERNAL MEDICINE | Facility: CLINIC | Age: 75
End: 2025-03-04

## 2025-03-04 ENCOUNTER — OFFICE VISIT (OUTPATIENT)
Dept: INTERNAL MEDICINE | Facility: CLINIC | Age: 75
End: 2025-03-04
Payer: MEDICARE

## 2025-03-04 VITALS
DIASTOLIC BLOOD PRESSURE: 58 MMHG | WEIGHT: 180.7 LBS | TEMPERATURE: 98 F | HEIGHT: 69 IN | HEART RATE: 56 BPM | SYSTOLIC BLOOD PRESSURE: 116 MMHG | BODY MASS INDEX: 26.76 KG/M2 | OXYGEN SATURATION: 99 % | RESPIRATION RATE: 18 BRPM

## 2025-03-04 DIAGNOSIS — N20.0 KIDNEY STONE: ICD-10-CM

## 2025-03-04 DIAGNOSIS — B33.24 VIRAL CARDIOMYOPATHY (H): ICD-10-CM

## 2025-03-04 DIAGNOSIS — I10 ESSENTIAL HYPERTENSION: ICD-10-CM

## 2025-03-04 DIAGNOSIS — E83.52 HYPERCALCEMIA: Primary | ICD-10-CM

## 2025-03-04 DIAGNOSIS — R26.89 BALANCE PROBLEMS: ICD-10-CM

## 2025-03-04 DIAGNOSIS — G24.3 CERVICAL DYSTONIA: ICD-10-CM

## 2025-03-04 LAB
ALBUMIN SERPL BCG-MCNC: 4.2 G/DL (ref 3.5–5.2)
ANION GAP SERPL CALCULATED.3IONS-SCNC: 8 MMOL/L (ref 7–15)
BUN SERPL-MCNC: 22.1 MG/DL (ref 8–23)
CA-I BLD-MCNC: 5.2 MG/DL (ref 4.4–5.2)
CALCIUM SERPL-MCNC: 10.8 MG/DL (ref 8.8–10.4)
CHLORIDE SERPL-SCNC: 104 MMOL/L (ref 98–107)
CREAT SERPL-MCNC: 1.17 MG/DL (ref 0.67–1.17)
EGFRCR SERPLBLD CKD-EPI 2021: 65 ML/MIN/1.73M2
GLUCOSE SERPL-MCNC: 82 MG/DL (ref 70–99)
HCO3 SERPL-SCNC: 27 MMOL/L (ref 22–29)
POTASSIUM SERPL-SCNC: 4.5 MMOL/L (ref 3.4–5.3)
PTH-INTACT SERPL-MCNC: 42 PG/ML (ref 15–65)
SODIUM SERPL-SCNC: 139 MMOL/L (ref 135–145)

## 2025-03-04 PROCEDURE — 1126F AMNT PAIN NOTED NONE PRSNT: CPT | Performed by: INTERNAL MEDICINE

## 2025-03-04 PROCEDURE — 82040 ASSAY OF SERUM ALBUMIN: CPT | Performed by: INTERNAL MEDICINE

## 2025-03-04 PROCEDURE — 3078F DIAST BP <80 MM HG: CPT | Performed by: INTERNAL MEDICINE

## 2025-03-04 PROCEDURE — G2211 COMPLEX E/M VISIT ADD ON: HCPCS | Performed by: INTERNAL MEDICINE

## 2025-03-04 PROCEDURE — 82306 VITAMIN D 25 HYDROXY: CPT | Performed by: INTERNAL MEDICINE

## 2025-03-04 PROCEDURE — 80048 BASIC METABOLIC PNL TOTAL CA: CPT | Performed by: INTERNAL MEDICINE

## 2025-03-04 PROCEDURE — 83970 ASSAY OF PARATHORMONE: CPT | Performed by: INTERNAL MEDICINE

## 2025-03-04 PROCEDURE — 3074F SYST BP LT 130 MM HG: CPT | Performed by: INTERNAL MEDICINE

## 2025-03-04 PROCEDURE — 82330 ASSAY OF CALCIUM: CPT | Performed by: INTERNAL MEDICINE

## 2025-03-04 PROCEDURE — 99214 OFFICE O/P EST MOD 30 MIN: CPT | Performed by: INTERNAL MEDICINE

## 2025-03-04 PROCEDURE — 36415 COLL VENOUS BLD VENIPUNCTURE: CPT | Performed by: INTERNAL MEDICINE

## 2025-03-04 RX ORDER — AMLODIPINE BESYLATE 5 MG/1
5 TABLET ORAL DAILY
COMMUNITY

## 2025-03-04 ASSESSMENT — PAIN SCALES - GENERAL: PAINLEVEL_OUTOF10: NO PAIN (0)

## 2025-03-04 NOTE — ASSESSMENT & PLAN NOTE
Following with Dr. Zamorano at [facility]. Has had botox injections, PT and one MBB that helped.   -Keeps at bay with daily exercises as well

## 2025-03-04 NOTE — ASSESSMENT & PLAN NOTE
He reports this has been an issue maybe over the last year.  Really only notices when he is walking, not with standing.  No associated dizziness.  Father had similar issues.  - Recommended PT for strengthening, he declined  - Recommended yoga, Lee chi, strengthening at home  - If they want PT referral in the future, they will send message

## 2025-03-04 NOTE — ASSESSMENT & PLAN NOTE
Calcium has been creeping up over the last year, was 10.7 with cardiologist in December.  -Will repeat calcium, ionized calcium, albumin and PTH today

## 2025-03-04 NOTE — ASSESSMENT & PLAN NOTE
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 Follows with Dr. Saldana at [facility]. Last TTE 12/2024 LVEF 55%. Has had elevated BNP in last few months.   - Discussed I would start SGLT-2, they will contact Dr. Saldana for prescription  - Continue lisinopril 30 mg daily and metoprolol 75 mg BID

## 2025-03-04 NOTE — PROGRESS NOTES
Assessment & Plan   Problem List Items Addressed This Visit          Nervous and Auditory    Cervical dystonia     Following with Dr. Zamorano at [facility]. Has had botox injections, PT and one MBB that helped.   -Keeps at bay with daily exercises as well            Endocrine    Hypercalcemia - Primary     Calcium has been creeping up over the last year, was 10.7 with cardiologist in December.  -Will repeat calcium, ionized calcium, albumin and PTH today         Relevant Orders    Basic metabolic panel    Parathyroid Hormone Intact    Albumin level    Ionized Calcium       Circulatory    Essential hypertension     Well-controlled in clinic today.  -Continue lisinopril 30 mg daily and metoprolol 75 mg twice daily         Cardiomyopathy (H)     Developed in 2019 after cardiomyelitis. Follows with Dr. Saldana at [facility]. Last TTE 12/2024 LVEF 55%. Has had elevated BNP in last few months.   - Discussed I would start SGLT-2, they will contact Dr. Saldana for prescription  - Continue lisinopril 30 mg daily and metoprolol 75 mg BID            Urinary    Kidney stone     Follows annually with Dr. Allen. Last cysto 6/2024.             Other    Balance problems     He reports this has been an issue maybe over the last year.  Really only notices when he is walking, not with standing.  No associated dizziness.  Father had similar issues.  - Recommended PT for strengthening, he declined  - Recommended yoga, Lee chi, strengthening at home  - If they want PT referral in the future, they will send message             The longitudinal plan of care for the diagnosis(es)/condition(s) as documented were addressed during this visit. Due to the added complexity in care, I will continue to support Pat in the subsequent management and with ongoing continuity of care.     FUTURE APPOINTMENTS:       - Follow-up visit in September as scheduled for AWV      Subjective   Pat is a 74 year old, presenting for the following health  "issues:  Follow Up (Cardiology appt. ) and Medication (Metoprolol - Patient taking 1.5 tablet twice a day. )        3/4/2025     1:47 PM   Additional Questions   Roomed by Steven ANSARI MA   Accompanied by Wife     History of Present Illness     Reason for visit:  Calcium level continues to creep up. Cardiologist want me to follow up with you re this   He is taking medications regularly.      Hx myocarditis / HTN: Last cardiology visit 12/4/24. BNP was elevated, echo ordered and plan repeat in 1 month. Plan to continue lisinopril 30 mg, toprolol 75 mg BID and amlodipine 5 mg daily.   - TTE 12/4/24 shoed LVEF 55%, basal inferolateral hypokinesis, no valvular disease  - Repeat BNP 1/2025 still elevated, did recommend SGLT2  - We talked about this today as well     CAD / HLD: ASA and statin     Hypercalcemia: recommended to check with me, here to discuss this today    Balance is another issue. Maybe over the last year. Was worse when he was on higher dose of metoprolol. Feels unsteady when he is walking. Kind of leaning.     Review of Systems  Constitutional, neuro, ENT, endocrine, pulmonary, cardiac, gastrointestinal, genitourinary, musculoskeletal, integument and psychiatric systems are negative, except as otherwise noted.      Objective    /58   Pulse 56   Temp 98  F (36.7  C) (Oral)   Resp 18   Ht 1.755 m (5' 9.09\")   Wt 82 kg (180 lb 11.2 oz)   SpO2 99%   BMI 26.61 kg/m    Body mass index is 26.61 kg/m .  Physical Exam   GENERAL: healthy, alert and no distress  EYES: Eyes grossly normal to inspection and conjunctivae and sclerae normal  HENT: nose and mouth without ulcers or lesions  RESP: breathing comfortably and speaking in full sentences on room air with no respiratory distress or coughing  CV: warm and well perfused  ABDOMEN: soft, nontender  SKIN: no suspicious lesions or rashes on exposed skin  NEURO: No focal deficits, mentation intact and speech normal  PSYCH: mentation appears normal, affect " normal/bright          Signed Electronically by: Taniya Sadler MD

## 2025-03-05 ENCOUNTER — LAB (OUTPATIENT)
Dept: LAB | Facility: CLINIC | Age: 75
End: 2025-03-05
Payer: MEDICARE

## 2025-03-05 DIAGNOSIS — E83.52 HYPERCALCEMIA: ICD-10-CM

## 2025-03-05 LAB — VIT D+METAB SERPL-MCNC: 26 NG/ML (ref 20–50)

## 2025-03-05 PROCEDURE — 82340 ASSAY OF CALCIUM IN URINE: CPT

## 2025-03-05 PROCEDURE — 84156 ASSAY OF PROTEIN URINE: CPT

## 2025-03-05 PROCEDURE — 81050 URINALYSIS VOLUME MEASURE: CPT

## 2025-03-06 LAB
ALBUMIN MFR UR ELPH: 6.9 MG/DL
CALCIUM 24H UR-MRATE: 0.38 G/SPEC (ref 0.1–0.3)
CALCIUM UR-MCNC: 25 MG/DL
COLLECT DURATION TIME UR: 24 H
COLLECT DURATION TIME UR: 24 H
PROT 24H UR-MRATE: 103.5 MG/SPECIMEN
SPECIMEN VOL UR: 1500 ML
SPECIMEN VOL UR: 1500 ML

## 2025-03-13 DIAGNOSIS — I50.22 CHRONIC HFREF (HEART FAILURE WITH REDUCED EJECTION FRACTION) (H): Primary | ICD-10-CM

## 2025-03-17 ENCOUNTER — OFFICE VISIT (OUTPATIENT)
Dept: SURGERY | Facility: CLINIC | Age: 75
End: 2025-03-17
Payer: MEDICARE

## 2025-03-17 VITALS — WEIGHT: 180 LBS | BODY MASS INDEX: 26.51 KG/M2

## 2025-03-17 DIAGNOSIS — E21.3 HYPERPARATHYROIDISM: ICD-10-CM

## 2025-03-17 DIAGNOSIS — E83.52 HYPERCALCEMIA: ICD-10-CM

## 2025-03-17 PROCEDURE — 99203 OFFICE O/P NEW LOW 30 MIN: CPT | Performed by: SPECIALIST

## 2025-03-17 NOTE — LETTER
3/17/2025      Chan Bishop  [address] [facility] [address]      Dear Colleague,    Thank you for referring your patient, Chan Bishop, to the [facility] SUR[facility]ERY [facility] AND [facility]IATRICS CARE [place]. Please see a copy of my visit note below.        [facility] Surgery Consult    HPI:    74 year old year old male who I have been consulted by Taniya Sadler for evaluation of Consult (Hyperparathyroidism- [facility])    Question of hyperparathyoidism. No symptoms. One elvated Calcium but notal Ionized calcium. Normal PTH but upper normal. Here to consult. History of drinking a lot of milk daily.     Allergies:Ciprofloxacin, Cyclobenzaprine hcl [cyclobenzaprine], Metronidazole, and Sulfa antibiotics    Past Medical History:   Diagnosis Date     Arthritis Osteoarthritis 2020     Congestive heart failure (H) 2021     Heart disease december 2019     HTN (hypertension)      Hyperlipidemia      Prostate CA (H)        Past Surgical History:   Procedure Laterality Date     BIOPSY  1996     COLONOSCOPY  2017     CV CORONARY ANGIOGRAM N/A 12/03/2019    Procedure: Coronary Angiogram;  Surgeon: Sheryl Mccormick MD;  Location: [facility] Cath Lab;  Service: Cardiology     CV LEFT HEART CATHETERIZATION WITHOUT LEFT VENTRICULOGRAM Left 12/03/2019    Procedure: Left Heart Catheterization Without Left Ventriculogram;  Surgeon: Sheryl Mccormick MD;  Location: [facility] Cath Lab;  Service: Cardiology     ENT SURGERY  2023    Septoplasty     HIP SURGERY       ORTHOPEDIC SURGERY  Hip resurfacing 2010     PROSTATECTOMY  01/01/1996       CURRENT MEDS:  Current Outpatient Medications   Medication Sig Dispense Refill     amLODIPine (NORVASC) 5 MG tablet Take 5 mg by mouth daily.       aspirin 81 mg chewable tablet [ASPIRIN 81 MG CHEWABLE TABLET] Chew 1 tablet (81 mg total) daily.  0     fluticasone (FLONASE) 50 MCG/ACT nasal spray Spray 1 spray into both nostrils daily.       lisinopril  (ZESTRIL) 30 MG tablet Take 1 tablet (30 mg) by mouth daily. 90 tablet 3     lovastatin (MEVACOR) 10 MG tablet Take 1 tablet (10 mg) by mouth at bedtime. 90 tablet 2     magnesium 250 MG tablet Take 1 tablet by mouth daily.       metoprolol succinate ER (TOPROL XL) 50 MG 24 hr tablet Take 1 tablet (50 mg) by mouth 2 times daily. (Patient taking differently: Take 75 mg by mouth 2 times daily. 70 mg total) 180 tablet 3     No current facility-administered medications for this visit.       Family History   Problem Relation Age of Onset     Ovarian Cancer Mother      Other Cancer Mother      Hypertension Father      Cerebrovascular Disease Paternal Grandfather      Diabetes Other         Aunt     Prostate Cancer Brother      Hypertension Brother      Other Cancer Sister      Diabetes Sister         Adult Onset Diabetes     Cerebrovascular Disease Other         Aunt     Mental Illness Sister         BiPolar and dementia        reports that he has never smoked. He has never been exposed to tobacco smoke. He has never used smokeless tobacco. He reports that he does not currently use alcohol. He reports that he does not use drugs.    Review of Systems:  Negative except history of cardiac issues. No symptoms presently. Otherwise normal state of health. Otherwise twelve system of review is negative.      OBJECTIVE:  Vitals:    03/17/25 1056   Weight: 81.6 kg (180 lb)     Body mass index is 26.51 kg/m .    EXAM:  GENERAL: This is a well-developed 74 year old male who appears his stated age  HEAD: normocephalic  HEENT: Pupils equal and reactive bilaterally  MOUTH: mucus membranes intact  CARDIAC: RRR without murmur  CHEST/LUNG:  Clear to auscultation  ABDOMEN: Soft, nontender, nondistended, no masses  EXTREMITIES: Grossly normal, warm,   NEUROLOGIC: Focally intact, nonfocal  INTEGUMENT: No open lesions or ulcers  VASCULAR: Pulses intact, symmetrical upper and lower extremities.    LABS:  Lab Results   Component Value Date     WBC 10.0 09/17/2024    HGB 14.4 09/17/2024    HCT 44.6 09/17/2024    MCV 85 09/17/2024     09/17/2024     Lab Results   Component Value Date    ALT 11 09/17/2024    AST 19 09/17/2024    ALKPHOS 130 09/17/2024      Ionized Calcium  Order: [id number]  Status: Final result       Visible to patient: Yes (seen)       Dx: Hypercalcemia    1 Result Note       1 Patient Communication         Component  Ref Range & Units 13 d ago    Calcium Ionized Whole Blood  4.4 - 5.2 mg/dL 5.2   Resulting Agency [facility]             Specimen Collected: 03/04/25  2:29 PM Last Resulted: 03/04/25  4:10 PM       Parathyroid Hormone Intact  Order: [id number]  Status: Final result       Visible to patient: Yes (seen)       Dx: Hypercalcemia    1 Result Note       1 Patient Communication         Component  Ref Range & Units 13 d ago    Parathyroid Hormone Intact  15 - 65 pg/mL 42   Resulting Agency [facility]              Narrative  Performed by: ULARRY  This result was obtained with the Roche Elecsys PTH STAT assay.  This reference range differs from PTH assays used in other [facility] laboratories.   Specimen Collected: 03/04/25  2:29 PM Last Resulted: 03/04/25  7:54 PM         Basic metabolic panel  Order: [id number]  Status: Final result       Visible to patient: Yes (seen)       Dx: Hypercalcemia    1 Result Note       1 Patient Communication       2  Topics               Component  Ref Range & Units 13 d ago  (3/4/25) 6 mo ago  (9/17/24) 1 yr ago  (8/28/23) 2 yr ago  (3/2/23) 2 yr ago  (12/7/22) 2 yr ago  (8/25/22) 2 yr ago  (6/28/22)    Sodium  135 - 145 mmol/L 139 140 139 R 141 R 136 R 140 R 142 R    Potassium  3.4 - 5.3 mmol/L 4.5 4.8 5.0 4.6 4.0 4.6     Chloride  98 - 107 mmol/L 104 105 105 106 102 107     Carbon Dioxide (CO2)  22 - 29 mmol/L 27 26 25 25 27 24     Anion Gap  7 - 15 mmol/L 8 9 9 10 7 9 9 R    Urea Nitrogen  8.0 - 23.0 mg/dL 22.1 28.6 High  20.6 13.1 23.2 High  26.4 High      Creatinine  0.67 - 1.17 mg/dL 1.17 1.15  1.22 High  1.22 High  1.12 1.09 1.14 R    GFR Estimate  >60 mL/min/1.73m2 65 67 CM 63 63 CM 70 CM 72 CM 68 CM   Comment: eGFR calculated using 2021 CKD-EPI equation.    Calcium  8.8 - 10.4 mg/dL 10.8 High  10.4 CM 10.2 R 9.9 R 10.2 R 9.9 R 10.2 R    Glucose  70 - 99 mg/dL 82 88 104 High  107 High  88 99    Resulting Agency [facility]             Specimen Collected: 03/04/25  2:29 PM Last Resulted: 03/04/25  7:35 PM               Assessment/Plan:   Questionable hyperparathyroidism    I do not really think he does have hyperparathyroidism at this time point he does have a slightly elevated calcium on 1 test and this was done 13 days ago.  His sinus calcium at that time point was normal his PTH at that time point was normal and then a year and calcium test was done which was slightly elevated I think that he has no symptoms he has no issues at this time point and all of his tests are count of borderline just calciums have been normal in the past as his PTH is so I would not rush for anything surgical or even further workup at this time point I recommendation is to probably have him follow-up in 1 year for repeat calcium and PTH.      No follow-ups on file.    Abdiel Florian MD  General Surgery [phone]  Vascular Surgery [phone]          Again, thank you for allowing me to participate in the care of your patient.        Sincerely,        Abdiel Florian MD    Electronically signed

## 2025-03-18 NOTE — PROGRESS NOTES
[facility] Surgery Consult    HPI:    74 year old year old male who I have been consulted by Taniya Sadler for evaluation of Consult ([facility]- [facility])    Question of hyperparathyoidism. No symptoms. One elvated Calcium but notal Ionized calcium. Normal PTH but upper normal. Here to consult. History of drinking a lot of milk daily.     Allergies:Ciprofloxacin, Cyclobenzaprine hcl [cyclobenzaprine], Metronidazole, and Sulfa antibiotics    Past Medical History:   Diagnosis Date    Arthritis Osteoarthritis 2020    Congestive heart failure (H) 2021    Heart disease december 2019    HTN (hypertension)     Hyperlipidemia     Prostate CA (H)        Past Surgical History:   Procedure Laterality Date    BIOPSY  1996    COLONOSCOPY  2017    CV CORONARY ANGIOGRAM N/A 12/03/2019    Procedure: Coronary Angiogram;  Surgeon: Sheryl Mccormick MD;  Location: [facility] Cath Lab;  Service: Cardiology    CV LEFT HEART CATHETERIZATION WITHOUT LEFT VENTRICULOGRAM Left 12/03/2019    Procedure: Left Heart Catheterization Without Left Ventriculogram;  Surgeon: Sheryl Mccormick MD;  Location: [facility] Cath Lab;  Service: Cardiology    ENT SURGERY  2023    Septoplasty    HIP SURGERY      ORTHOPEDIC SURGERY  Hip resurfacing 2010    PROSTATECTOMY  01/01/1996       CURRENT MEDS:  Current Outpatient Medications   Medication Sig Dispense Refill    amLODIPine (NORVASC) 5 MG tablet Take 5 mg by mouth daily.      aspirin 81 mg chewable tablet [ASPIRIN 81 MG CHEWABLE TABLET] Chew 1 tablet (81 mg total) daily.  0    fluticasone (FLONASE) 50 MCG/ACT nasal spray Spray 1 spray into both nostrils daily.      lisinopril (ZESTRIL) 30 MG tablet Take 1 tablet (30 mg) by mouth daily. 90 tablet 3    lovastatin (MEVACOR) 10 MG tablet Take 1 tablet (10 mg) by mouth at bedtime. 90 tablet 2    magnesium 250 MG tablet Take 1 tablet by mouth daily.      metoprolol succinate ER (TOPROL XL) 50 MG 24 hr tablet  Take 1 tablet (50 mg) by mouth 2 times daily. (Patient taking differently: Take 75 mg by mouth 2 times daily. 70 mg total) 180 tablet 3     No current facility-administered medications for this visit.       Family History   Problem Relation Age of Onset    Ovarian Cancer Mother     Other Cancer Mother     Hypertension Father     Cerebrovascular Disease Paternal Grandfather     Diabetes Other         Aunt    Prostate Cancer Brother     Hypertension Brother     Other Cancer Sister     Diabetes Sister         Adult Onset Diabetes    Cerebrovascular Disease Other         Aunt    Mental Illness Sister         BiPolar and dementia        reports that he has never smoked. He has never been exposed to tobacco smoke. He has never used smokeless tobacco. He reports that he does not currently use alcohol. He reports that he does not use drugs.    Review of Systems:  Negative except history of cardiac issues. No symptoms presently. Otherwise normal state of health. Otherwise twelve system of review is negative.      OBJECTIVE:  Vitals:    03/17/25 1056   Weight: 81.6 kg (180 lb)     Body mass index is 26.51 kg/m .    EXAM:  GENERAL: This is a well-developed 74 year old male who appears his stated age  HEAD: normocephalic  HEENT: Pupils equal and reactive bilaterally  MOUTH: mucus membranes intact  CARDIAC: RRR without murmur  CHEST/LUNG:  Clear to auscultation  ABDOMEN: Soft, nontender, nondistended, no masses  EXTREMITIES: Grossly normal, warm,   NEUROLOGIC: Focally intact, nonfocal  INTEGUMENT: No open lesions or ulcers  VASCULAR: Pulses intact, symmetrical upper and lower extremities.    LABS:  Lab Results   Component Value Date    WBC 10.0 09/17/2024    HGB 14.4 09/17/2024    HCT 44.6 09/17/2024    MCV 85 09/17/2024     09/17/2024     Lab Results   Component Value Date    ALT 11 09/17/2024    AST 19 09/17/2024    ALKPHOS 130 09/17/2024      Ionized Calcium  Order: [id number]  Status: Final result       Visible to  patient: Yes (seen)       Dx: Hypercalcemia    1 Result Note       1 Patient Communication         Component  Ref Range & Units 13 d ago    Calcium Ionized Whole Blood  4.4 - 5.2 mg/dL 5.2   Resulting Agency [facility]             Specimen Collected: 03/04/25  2:29 PM Last Resulted: 03/04/25  4:10 PM       Parathyroid Hormone Intact  Order: [id number]  Status: Final result       Visible to patient: Yes (seen)       Dx: Hypercalcemia    1 Result Note       1 Patient Communication         Component  Ref Range & Units 13 d ago    Parathyroid Hormone Intact  15 - 65 pg/mL 42   Resulting Agency [facility]              Narrative  Performed by: ALEJANDRO  This result was obtained with the Roche Elecsys PTH STAT assay.  This reference range differs from PTH assays used in other [facility] laboratories.   Specimen Collected: 03/04/25  2:29 PM Last Resulted: 03/04/25  7:54 PM         Basic metabolic panel  Order: [id number]  Status: Final result       Visible to patient: Yes (seen)       Dx: Hypercalcemia    1 Result Note       1 Patient Communication       2 HM Topics               Component  Ref Range & Units 13 d ago  (3/4/25) 6 mo ago  (9/17/24) 1 yr ago  (8/28/23) 2 yr ago  (3/2/23) 2 yr ago  (12/7/22) 2 yr ago  (8/25/22) 2 yr ago  (6/28/22)    Sodium  135 - 145 mmol/L 139 140 139 R 141 R 136 R 140 R 142 R    Potassium  3.4 - 5.3 mmol/L 4.5 4.8 5.0 4.6 4.0 4.6     Chloride  98 - 107 mmol/L 104 105 105 106 102 107     Carbon Dioxide (CO2)  22 - 29 mmol/L 27 26 25 25 27 24     Anion Gap  7 - 15 mmol/L 8 9 9 10 7 9 9 R    Urea Nitrogen  8.0 - 23.0 mg/dL 22.1 28.6 High  20.6 13.1 23.2 High  26.4 High      Creatinine  0.67 - 1.17 mg/dL 1.17 1.15 1.22 High  1.22 High  1.12 1.09 1.14 R    GFR Estimate  >60 mL/min/1.73m2 65 67 CM 63 63 CM 70 CM 72 CM 68 CM   Comment: eGFR calculated using 2021 CKD-EPI equation.    Calcium  8.8 - 10.4 mg/dL 10.8 High  10.4 CM 10.2 R 9.9 R 10.2 R 9.9 R 10.2 R    Glucose  70 - 99 mg/dL 82 88 104 High  107  High  88 99    Resulting Agency [facility]             Specimen Collected: 03/04/25  2:29 PM Last Resulted: 03/04/25  7:35 PM               Assessment/Plan:   Questionable hyperparathyroidism    I do not really think he does have hyperparathyroidism at this time point he does have a slightly elevated calcium on 1 test and this was done 13 days ago.  His sinus calcium at that time point was normal his PTH at that time point was normal and then a year and calcium test was done which was slightly elevated I think that he has no symptoms he has no issues at this time point and all of his tests are count of borderline just calciums have been normal in the past as his PTH is so I would not rush for anything surgical or even further workup at this time point I recommendation is to probably have him follow-up in 1 year for repeat calcium and PTH.      No follow-ups on file.    Abdiel Florian MD  General Surgery [phone]  Vascular Surgery [phone]

## 2025-03-26 DIAGNOSIS — I50.22 CHRONIC SYSTOLIC HEART FAILURE (H): Primary | ICD-10-CM

## 2025-04-02 ENCOUNTER — TELEPHONE (OUTPATIENT)
Dept: INTERNAL MEDICINE | Facility: CLINIC | Age: 75
End: 2025-04-02
Payer: MEDICARE

## 2025-04-02 NOTE — TELEPHONE ENCOUNTER
Spoke with patient. PCP has no openings but patient wants to see an internist provider. Patient DECLINES to see Dr. Bill. Writer assisted with rescheduling patient to be seen by Divina Gonzalez DNP.    Patient thanked for the call.

## 2025-04-02 NOTE — TELEPHONE ENCOUNTER
Reason for Call:  Appointment Request    Patient requesting this type of appt:  Hospital/ED Follow-Up     Requested provider: Taniya Sadler    Reason patient unable to be scheduled: Not within requested timeframe    When does patient want to be seen/preferred time: 3-7 days    Comments: PT would prefer to do Hosp Follow-up w Vik[facility] vs [facility]. Please call to schedule Discharge 3/2 Cardiac     Could we send this information to you in [facility] or would you prefer to receive a phone call?:   Patient would prefer a phone call   Okay to leave a detailed message?: Yes at Cell number on file:    Telephone Information:   Mobile [phone]       Call taken on 4/2/2025 at 3:45 PM by Juhi Marx

## 2025-04-09 PROBLEM — D86.85 CARDIAC SARCOIDOSIS: Status: ACTIVE | Noted: 2025-03-28

## 2025-04-11 PROBLEM — I49.3 PVC'S (PREMATURE VENTRICULAR CONTRACTIONS): Status: ACTIVE | Noted: 2025-04-11

## 2025-06-02 ENCOUNTER — TRANSFERRED RECORDS (OUTPATIENT)
Dept: HEALTH INFORMATION MANAGEMENT | Facility: CLINIC | Age: 75
End: 2025-06-02
Payer: MEDICARE

## 2025-07-08 DIAGNOSIS — E88.810 METABOLIC SYNDROME: ICD-10-CM

## 2025-07-08 NOTE — TELEPHONE ENCOUNTER
Medication Question or Refill        What medication are you calling about (include dose and sig)?:    Disp Refills Start End ALEIDA   lovastatin (MEVACOR) 10 MG tablet 90 tablet 2 10/3/2024 -- No   Sig - Route: Take 1 tablet (10 mg) by mouth at bedtime. - Oral   Sent to pharmacy as: Lovastatin 10 MG Oral Tablet (MEVACOR)   Class: E-Prescribe   Order: [id number]   E-Prescribing Status: Receipt confirmed by pharmacy (10/3/2024  1:30 PM CDT)       Preferred Pharmacy:   [id number] [[address] [address]  Phone: [phone] Fax: [phone]      Controlled Substance Agreement on file:   CSA -- Patient Level:    CSA: None found at the patient level.       Who prescribed the medication?: PCP    Do you need a refill? Yes    Could we send this information to you in [facility] or would you prefer to receive a phone call?:   Patient would prefer a phone call   Okay to leave a detailed message?: Yes at Cell number on file:    Telephone Information:   Mobile [phone]

## 2025-07-09 RX ORDER — LOVASTATIN 10 MG/1
10 TABLET ORAL AT BEDTIME
Qty: 90 TABLET | Refills: 0 | Status: SHIPPED | OUTPATIENT
Start: 2025-07-09

## 2025-08-21 ENCOUNTER — OFFICE VISIT (OUTPATIENT)
Dept: INTERNAL MEDICINE | Facility: CLINIC | Age: 75
End: 2025-08-21
Payer: MEDICARE

## 2025-08-21 VITALS
WEIGHT: 182 LBS | SYSTOLIC BLOOD PRESSURE: 114 MMHG | TEMPERATURE: 97.8 F | RESPIRATION RATE: 16 BRPM | HEART RATE: 65 BPM | OXYGEN SATURATION: 99 % | BODY MASS INDEX: 26.96 KG/M2 | HEIGHT: 69 IN | DIASTOLIC BLOOD PRESSURE: 78 MMHG

## 2025-08-21 DIAGNOSIS — R42 DIZZINESS: ICD-10-CM

## 2025-08-21 DIAGNOSIS — Z09 HOSPITAL DISCHARGE FOLLOW-UP: Primary | ICD-10-CM

## 2025-08-21 DIAGNOSIS — D86.85 CARDIAC SARCOIDOSIS: ICD-10-CM

## 2025-08-21 RX ORDER — METOPROLOL SUCCINATE 25 MG/1
25 TABLET, EXTENDED RELEASE ORAL 2 TIMES DAILY
COMMUNITY
Start: 2025-08-19

## 2025-08-21 RX ORDER — MYCOPHENOLATE MOFETIL 250 MG/1
500 CAPSULE ORAL 2 TIMES DAILY
COMMUNITY

## 2025-08-21 RX ORDER — MYCOPHENOLATE MOFETIL 500 MG/1
500 TABLET ORAL 2 TIMES DAILY
COMMUNITY
Start: 2025-07-16 | End: 2025-08-21

## 2025-08-21 RX ORDER — PREDNISONE 10 MG/1
10 TABLET ORAL DAILY
COMMUNITY
Start: 2025-07-25

## 2025-09-02 ENCOUNTER — TELEPHONE (OUTPATIENT)
Dept: INTERNAL MEDICINE | Facility: CLINIC | Age: 75
End: 2025-09-02
Payer: MEDICARE

## 2025-09-03 ENCOUNTER — HOSPITAL ENCOUNTER (EMERGENCY)
Facility: HOSPITAL | Age: 75
Discharge: HOME | End: 2025-09-03
Attending: EMERGENCY MEDICINE | Admitting: EMERGENCY MEDICINE
Payer: MEDICARE

## 2025-09-03 VITALS
SYSTOLIC BLOOD PRESSURE: 185 MMHG | DIASTOLIC BLOOD PRESSURE: 100 MMHG | HEART RATE: 67 BPM | RESPIRATION RATE: 18 BRPM | BODY MASS INDEX: 25.91 KG/M2 | TEMPERATURE: 98 F | OXYGEN SATURATION: 99 % | HEIGHT: 70 IN | WEIGHT: 181 LBS

## 2025-09-03 DIAGNOSIS — R29.898 RIGHT LEG WEAKNESS: Primary | ICD-10-CM

## 2025-09-03 LAB
ALBUMIN UR-MCNC: NEGATIVE MG/DL
ANION GAP SERPL CALCULATED.3IONS-SCNC: 12 MMOL/L (ref 7–15)
APPEARANCE UR: CLEAR
BASOPHILS # BLD AUTO: 0.05 10E3/UL (ref 0–0.2)
BASOPHILS NFR BLD AUTO: 0.3 %
BILIRUB UR QL STRIP: NEGATIVE
BUN SERPL-MCNC: 22.9 MG/DL (ref 8–23)
CALCIUM SERPL-MCNC: 10.4 MG/DL (ref 8.8–10.4)
CHLORIDE SERPL-SCNC: 105 MMOL/L (ref 98–107)
COLOR UR AUTO: ABNORMAL
CREAT SERPL-MCNC: 1.29 MG/DL (ref 0.67–1.17)
EGFRCR SERPLBLD CKD-EPI 2021: 58 ML/MIN/1.73M2
EOSINOPHIL # BLD AUTO: 0.03 10E3/UL (ref 0–0.7)
EOSINOPHIL NFR BLD AUTO: 0.2 %
ERYTHROCYTE [DISTWIDTH] IN BLOOD BY AUTOMATED COUNT: 13.7 % (ref 10–15)
GLUCOSE SERPL-MCNC: 80 MG/DL (ref 70–99)
GLUCOSE UR STRIP-MCNC: NEGATIVE MG/DL
HCO3 SERPL-SCNC: 25 MMOL/L (ref 22–29)
HCT VFR BLD AUTO: 45.1 % (ref 40–53)
HGB BLD-MCNC: 15.2 G/DL (ref 13.3–17.7)
HGB UR QL STRIP: NEGATIVE
IMM GRANULOCYTES # BLD: 0.16 10E3/UL
IMM GRANULOCYTES NFR BLD: 1.1 %
KETONES UR STRIP-MCNC: NEGATIVE MG/DL
LEUKOCYTE ESTERASE UR QL STRIP: NEGATIVE
LYMPHOCYTES # BLD AUTO: 2.01 10E3/UL (ref 0.8–5.3)
LYMPHOCYTES NFR BLD AUTO: 14 %
MAGNESIUM SERPL-MCNC: 2 MG/DL (ref 1.7–2.3)
MCH RBC QN AUTO: 31 PG (ref 26.5–33)
MCHC RBC AUTO-ENTMCNC: 33.7 G/DL (ref 31.5–36.5)
MCV RBC AUTO: 92 FL (ref 78–100)
MONOCYTES # BLD AUTO: 1.15 10E3/UL (ref 0–1.3)
MONOCYTES NFR BLD AUTO: 8 %
MUCOUS THREADS #/AREA URNS LPF: PRESENT /LPF
NEUTROPHILS # BLD AUTO: 10.95 10E3/UL (ref 1.6–8.3)
NEUTROPHILS NFR BLD AUTO: 76.4 %
NITRATE UR QL: NEGATIVE
NRBC # BLD AUTO: <0.03 10E3/UL
NRBC BLD AUTO-RTO: 0 /100
PH UR STRIP: 5.5 [PH] (ref 5–7)
PLATELET # BLD AUTO: 255 10E3/UL (ref 150–450)
POTASSIUM SERPL-SCNC: 4.4 MMOL/L (ref 3.4–5.3)
RBC # BLD AUTO: 4.9 10E6/UL (ref 4.4–5.9)
RBC URINE: <1 /HPF
SODIUM SERPL-SCNC: 142 MMOL/L (ref 135–145)
SP GR UR STRIP: 1.02 (ref 1–1.03)
SQUAMOUS EPITHELIAL: <1 /HPF
UROBILINOGEN UR STRIP-MCNC: NORMAL MG/DL
WBC # BLD AUTO: 14.35 10E3/UL (ref 4–11)
WBC URINE: <1 /HPF

## 2025-09-03 PROCEDURE — 93005 ELECTROCARDIOGRAM TRACING: CPT | Performed by: EMERGENCY MEDICINE

## 2025-09-03 PROCEDURE — 81001 URINALYSIS AUTO W/SCOPE: CPT | Performed by: EMERGENCY MEDICINE

## 2025-09-03 PROCEDURE — 85018 HEMOGLOBIN: CPT | Performed by: EMERGENCY MEDICINE

## 2025-09-03 PROCEDURE — 99285 EMERGENCY DEPT VISIT HI MDM: CPT | Mod: 25 | Performed by: EMERGENCY MEDICINE

## 2025-09-03 PROCEDURE — 82310 ASSAY OF CALCIUM: CPT | Performed by: EMERGENCY MEDICINE

## 2025-09-03 PROCEDURE — 36415 COLL VENOUS BLD VENIPUNCTURE: CPT | Performed by: EMERGENCY MEDICINE

## 2025-09-03 PROCEDURE — 83735 ASSAY OF MAGNESIUM: CPT | Performed by: EMERGENCY MEDICINE

## 2025-09-03 PROCEDURE — 255N000002 HC RX 255 OP 636: Performed by: EMERGENCY MEDICINE

## 2025-09-03 RX ADMIN — IOHEXOL 71 ML: 350 INJECTION, SOLUTION INTRAVENOUS at 19:54

## 2025-09-03 ASSESSMENT — COLUMBIA-SUICIDE SEVERITY RATING SCALE - C-SSRS
1. IN THE PAST MONTH, HAVE YOU WISHED YOU WERE DEAD OR WISHED YOU COULD GO TO SLEEP AND NOT WAKE UP?: NO
6. HAVE YOU EVER DONE ANYTHING, STARTED TO DO ANYTHING, OR PREPARED TO DO ANYTHING TO END YOUR LIFE?: NO
2. HAVE YOU ACTUALLY HAD ANY THOUGHTS OF KILLING YOURSELF IN THE PAST MONTH?: NO

## 2025-09-03 ASSESSMENT — ACTIVITIES OF DAILY LIVING (ADL): ADLS_ACUITY_SCORE: 41
